# Patient Record
Sex: MALE | Race: WHITE | URBAN - METROPOLITAN AREA
[De-identification: names, ages, dates, MRNs, and addresses within clinical notes are randomized per-mention and may not be internally consistent; named-entity substitution may affect disease eponyms.]

---

## 2017-10-22 ENCOUNTER — EMERGENCY (EMERGENCY)
Facility: HOSPITAL | Age: 72
LOS: 1 days | Discharge: HOME | End: 2017-10-22
Attending: EMERGENCY MEDICINE | Admitting: EMERGENCY MEDICINE
Payer: MEDICARE

## 2017-10-22 VITALS
HEART RATE: 89 BPM | WEIGHT: 169.98 LBS | TEMPERATURE: 98 F | DIASTOLIC BLOOD PRESSURE: 89 MMHG | SYSTOLIC BLOOD PRESSURE: 156 MMHG | OXYGEN SATURATION: 100 % | RESPIRATION RATE: 16 BRPM

## 2017-10-22 VITALS
OXYGEN SATURATION: 98 % | RESPIRATION RATE: 16 BRPM | DIASTOLIC BLOOD PRESSURE: 95 MMHG | HEART RATE: 88 BPM | SYSTOLIC BLOOD PRESSURE: 145 MMHG | TEMPERATURE: 98 F

## 2017-10-22 PROCEDURE — 74020: CPT | Mod: 26

## 2017-10-22 PROCEDURE — 99283 EMERGENCY DEPT VISIT LOW MDM: CPT | Mod: GC

## 2017-10-22 PROCEDURE — 74020: CPT

## 2017-10-22 PROCEDURE — 99283 EMERGENCY DEPT VISIT LOW MDM: CPT | Mod: 25

## 2017-10-22 RX ORDER — MULTIVIT WITH MIN/MFOLATE/K2 340-15/3 G
180 POWDER (GRAM) ORAL ONCE
Qty: 0 | Refills: 0 | Status: COMPLETED | OUTPATIENT
Start: 2017-10-22 | End: 2017-10-22

## 2017-10-22 RX ADMIN — Medication 1 ENEMA: at 08:05

## 2017-10-22 NOTE — ED ADULT NURSE NOTE - CHPI ED SYMPTOMS NEG
no dysuria/no burning urination/no diarrhea/no chills/no vomiting/no nausea/no blood in stool/no abdominal distension/no fever/no hematuria

## 2017-10-22 NOTE — ED PROVIDER NOTE - PROGRESS NOTE DETAILS
Discussed with patient the findings of his xray (heavy stool burden) and next step of fleet enema.  Moved patient from hallway bed to room for procedure.  Patient became anxious and upset at the delay to treatment and notified Dr. Bassem Jj that he was upset.  Within 5 minutes of making comments to Dr. Jj patient was seen by Dr. Rodriguez in room for enema administration.  Patient calmed down. Discussed with patient the findings of his xray (heavy stool burden) and next step of fleet enema.  Moved patient from hallway bed to room for procedure.  Patient became anxious and upset at the delay to treatment and notified team that he was upset.  Within 5 minutes of making comments to team, patient was seen by Dr. Rodriguez in room for enema administration.  Patient calmed down.

## 2017-10-22 NOTE — ED PROVIDER NOTE - OBJECTIVE STATEMENT
72M with no past medical history presents with 2d h/o constipation.  States he is normally regular on a daily basis.  Has had no changes to his diet and takes fiber regularly.  Denies abdominal pain, n/v, fever, chills, dysuria, hematuria, melena, hematochezia, chest pain, shortness of breath, rectal pain.

## 2017-10-22 NOTE — ED PROVIDER NOTE - ATTENDING CONTRIBUTION TO CARE
Attending MD Erickson:  I personally have seen and examined this patient.  Resident note reviewed and agree on plan of care and except where noted.  See MDM for details.

## 2017-10-22 NOTE — ED ADULT NURSE REASSESSMENT NOTE - NS ED NURSE REASSESS COMMENT FT1
Pt refusing treatment observed speaking with MD Rodriguez. Pt reporting upset " with my treatment here" " No one seen me or came to speak to me" patient reassured that   he was seen by Rn and MD ( resident) and Xray was ordered. Refusing Enema reports "I don't want this kind of Nurse to give me the enema" "I prefer one of the female nurses" MD Rodriguez at bedside.

## 2017-10-22 NOTE — ED PROVIDER NOTE - MEDICAL DECISION MAKING DETAILS
Dre ETIENNE: 73 y/o male without PMH here with constipation. Patient reports a few weeks of straining to go to the bathroom and 3 days of constipation. Patient reports some stressors at home. Denies fever, abd pain, abd distention, melena, BRBPR, fever, N/V or hx of abd surgeries or weight loss. Exam shows a man in NAD. Abd soft and nontender and nondistended. No CVA tenderness. Rectal exam with nonbleeding and nonthrombosed hemorrhoid at the 6 O'clock position. No rectal impaction noted. Patient appears well overall and will get an enema and to be reassessed.

## 2017-10-22 NOTE — ED ADULT NURSE NOTE - OBJECTIVE STATEMENT
71 yo m arrived ambulatory from triage. Reports abd pain and constipation. No pertinent PMH.  last BM 2 days ago. No changes to diet. MD at bedside. Denies NVD. No fever or chills.

## 2017-10-22 NOTE — ED ADULT NURSE REASSESSMENT NOTE - NS ED NURSE REASSESS COMMENT FT1
pt received in [facility] after speaking with ANM pt with rectal exam done by Dr Rodriguez with small hemmorhoid on exam no impaction pt agreed to Fleet which was given as ordered pt had some movement after procedure pt verbalized feeling very stressed due to issue with bank and identity theft states bowel issues started at that time pt has no primary care md pt encouraged to find someone to talk with about stressful issues and follow up with a md for GI evaluation pt agreed that was a good idea pt pending a discharge from the ER

## 2021-02-08 ENCOUNTER — INPATIENT (INPATIENT)
Facility: HOSPITAL | Age: 76
LOS: 10 days | Discharge: NURSING, REHAB OR LONG-TERM CARE | DRG: 471 | End: 2021-02-19
Attending: NEUROLOGICAL SURGERY | Admitting: NEUROLOGICAL SURGERY
Payer: MEDICARE

## 2021-02-08 VITALS
TEMPERATURE: 97 F | SYSTOLIC BLOOD PRESSURE: 150 MMHG | HEART RATE: 91 BPM | DIASTOLIC BLOOD PRESSURE: 90 MMHG | HEIGHT: 73 IN | WEIGHT: 139.99 LBS | OXYGEN SATURATION: 97 % | RESPIRATION RATE: 18 BRPM

## 2021-02-08 LAB
ALBUMIN SERPL ELPH-MCNC: 4.5 G/DL — SIGNIFICANT CHANGE UP (ref 3.3–5)
ALP SERPL-CCNC: 133 U/L — HIGH (ref 40–120)
ALT FLD-CCNC: 12 U/L — SIGNIFICANT CHANGE UP (ref 10–45)
ANION GAP SERPL CALC-SCNC: 20 MMOL/L — HIGH (ref 5–17)
APTT BLD: 29.2 SEC — SIGNIFICANT CHANGE UP (ref 27.5–35.5)
AST SERPL-CCNC: 26 U/L — SIGNIFICANT CHANGE UP (ref 10–40)
BASOPHILS # BLD AUTO: 0.08 K/UL — SIGNIFICANT CHANGE UP (ref 0–0.2)
BASOPHILS NFR BLD AUTO: 0.9 % — SIGNIFICANT CHANGE UP (ref 0–2)
BILIRUB SERPL-MCNC: 1.3 MG/DL — HIGH (ref 0.2–1.2)
BLD GP AB SCN SERPL QL: NEGATIVE — SIGNIFICANT CHANGE UP
BUN SERPL-MCNC: 34 MG/DL — HIGH (ref 7–23)
CALCIUM SERPL-MCNC: 9.7 MG/DL — SIGNIFICANT CHANGE UP (ref 8.4–10.5)
CHLORIDE SERPL-SCNC: 107 MMOL/L — SIGNIFICANT CHANGE UP (ref 96–108)
CK SERPL-CCNC: 204 U/L — HIGH (ref 30–200)
CO2 SERPL-SCNC: 21 MMOL/L — LOW (ref 22–31)
CREAT SERPL-MCNC: 1.42 MG/DL — HIGH (ref 0.5–1.3)
EOSINOPHIL # BLD AUTO: 0.05 K/UL — SIGNIFICANT CHANGE UP (ref 0–0.5)
EOSINOPHIL NFR BLD AUTO: 0.6 % — SIGNIFICANT CHANGE UP (ref 0–6)
GLUCOSE SERPL-MCNC: 83 MG/DL — SIGNIFICANT CHANGE UP (ref 70–99)
HCT VFR BLD CALC: 40.4 % — SIGNIFICANT CHANGE UP (ref 39–50)
HGB BLD-MCNC: 12.7 G/DL — LOW (ref 13–17)
IMM GRANULOCYTES NFR BLD AUTO: 0.3 % — SIGNIFICANT CHANGE UP (ref 0–1.5)
INR BLD: 1.04 RATIO — SIGNIFICANT CHANGE UP (ref 0.88–1.16)
LYMPHOCYTES # BLD AUTO: 0.95 K/UL — LOW (ref 1–3.3)
LYMPHOCYTES # BLD AUTO: 10.8 % — LOW (ref 13–44)
MCHC RBC-ENTMCNC: 29.3 PG — SIGNIFICANT CHANGE UP (ref 27–34)
MCHC RBC-ENTMCNC: 31.4 GM/DL — LOW (ref 32–36)
MCV RBC AUTO: 93.3 FL — SIGNIFICANT CHANGE UP (ref 80–100)
MONOCYTES # BLD AUTO: 0.62 K/UL — SIGNIFICANT CHANGE UP (ref 0–0.9)
MONOCYTES NFR BLD AUTO: 7 % — SIGNIFICANT CHANGE UP (ref 2–14)
NEUTROPHILS # BLD AUTO: 7.1 K/UL — SIGNIFICANT CHANGE UP (ref 1.8–7.4)
NEUTROPHILS NFR BLD AUTO: 80.4 % — HIGH (ref 43–77)
NRBC # BLD: 0 /100 WBCS — SIGNIFICANT CHANGE UP (ref 0–0)
PLATELET # BLD AUTO: 308 K/UL — SIGNIFICANT CHANGE UP (ref 150–400)
POTASSIUM SERPL-MCNC: 4.2 MMOL/L — SIGNIFICANT CHANGE UP (ref 3.5–5.3)
POTASSIUM SERPL-SCNC: 4.2 MMOL/L — SIGNIFICANT CHANGE UP (ref 3.5–5.3)
PROT SERPL-MCNC: 8 G/DL — SIGNIFICANT CHANGE UP (ref 6–8.3)
PROTHROM AB SERPL-ACNC: 12.4 SEC — SIGNIFICANT CHANGE UP (ref 10.6–13.6)
RBC # BLD: 4.33 M/UL — SIGNIFICANT CHANGE UP (ref 4.2–5.8)
RBC # FLD: 13.1 % — SIGNIFICANT CHANGE UP (ref 10.3–14.5)
RH IG SCN BLD-IMP: POSITIVE — SIGNIFICANT CHANGE UP
RH IG SCN BLD-IMP: POSITIVE — SIGNIFICANT CHANGE UP
SODIUM SERPL-SCNC: 148 MMOL/L — HIGH (ref 135–145)
WBC # BLD: 8.83 K/UL — SIGNIFICANT CHANGE UP (ref 3.8–10.5)
WBC # FLD AUTO: 8.83 K/UL — SIGNIFICANT CHANGE UP (ref 3.8–10.5)

## 2021-02-08 PROCEDURE — 73502 X-RAY EXAM HIP UNI 2-3 VIEWS: CPT | Mod: 26,LT

## 2021-02-08 PROCEDURE — 74177 CT ABD & PELVIS W/CONTRAST: CPT | Mod: 26,MA

## 2021-02-08 PROCEDURE — 72125 CT NECK SPINE W/O DYE: CPT | Mod: 26,MH

## 2021-02-08 PROCEDURE — 70498 CT ANGIOGRAPHY NECK: CPT | Mod: 26,MA

## 2021-02-08 PROCEDURE — 99291 CRITICAL CARE FIRST HOUR: CPT | Mod: GC

## 2021-02-08 PROCEDURE — 72128 CT CHEST SPINE W/O DYE: CPT | Mod: 26,MA

## 2021-02-08 PROCEDURE — 72131 CT LUMBAR SPINE W/O DYE: CPT | Mod: 26,MA

## 2021-02-08 PROCEDURE — 73552 X-RAY EXAM OF FEMUR 2/>: CPT | Mod: 26,LT

## 2021-02-08 PROCEDURE — 71260 CT THORAX DX C+: CPT | Mod: 26,MA

## 2021-02-08 PROCEDURE — 71045 X-RAY EXAM CHEST 1 VIEW: CPT | Mod: 26

## 2021-02-08 RX ORDER — MORPHINE SULFATE 50 MG/1
4 CAPSULE, EXTENDED RELEASE ORAL ONCE
Refills: 0 | Status: DISCONTINUED | OUTPATIENT
Start: 2021-02-08 | End: 2021-02-08

## 2021-02-08 RX ORDER — TETANUS TOXOID, REDUCED DIPHTHERIA TOXOID AND ACELLULAR PERTUSSIS VACCINE, ADSORBED 5; 2.5; 8; 8; 2.5 [IU]/.5ML; [IU]/.5ML; UG/.5ML; UG/.5ML; UG/.5ML
0.5 SUSPENSION INTRAMUSCULAR ONCE
Refills: 0 | Status: COMPLETED | OUTPATIENT
Start: 2021-02-08 | End: 2021-02-08

## 2021-02-08 RX ORDER — SODIUM CHLORIDE 9 MG/ML
1000 INJECTION INTRAMUSCULAR; INTRAVENOUS; SUBCUTANEOUS ONCE
Refills: 0 | Status: COMPLETED | OUTPATIENT
Start: 2021-02-08 | End: 2021-02-08

## 2021-02-08 RX ORDER — IBUPROFEN 200 MG
600 TABLET ORAL ONCE
Refills: 0 | Status: COMPLETED | OUTPATIENT
Start: 2021-02-08 | End: 2021-02-08

## 2021-02-08 RX ORDER — ACETAMINOPHEN 500 MG
650 TABLET ORAL ONCE
Refills: 0 | Status: COMPLETED | OUTPATIENT
Start: 2021-02-08 | End: 2021-02-08

## 2021-02-08 RX ADMIN — Medication 650 MILLIGRAM(S): at 18:54

## 2021-02-08 RX ADMIN — Medication 600 MILLIGRAM(S): at 18:54

## 2021-02-08 RX ADMIN — MORPHINE SULFATE 4 MILLIGRAM(S): 50 CAPSULE, EXTENDED RELEASE ORAL at 21:16

## 2021-02-08 RX ADMIN — SODIUM CHLORIDE 1000 MILLILITER(S): 9 INJECTION INTRAMUSCULAR; INTRAVENOUS; SUBCUTANEOUS at 18:54

## 2021-02-08 RX ADMIN — TETANUS TOXOID, REDUCED DIPHTHERIA TOXOID AND ACELLULAR PERTUSSIS VACCINE, ADSORBED 0.5 MILLILITER(S): 5; 2.5; 8; 8; 2.5 SUSPENSION INTRAMUSCULAR at 18:55

## 2021-02-08 RX ADMIN — Medication 1 MILLIGRAM(S): at 23:00

## 2021-02-08 NOTE — ED PROVIDER NOTE - PROGRESS NOTE DETAILS
Nadeem Jones D.O., PGY2 (Resident)  Patient signed out to me. No current neuro deficits. Neurosurg c/s. CTA neck ordered for BCVI. Trauma scan Nadeem Jones D.O., PGY2 (Resident)  Spoke to neurosurg. Would like MR whole spine, noncontrast to eval compression. MRI and neurorads called to protocol. Patient informed. Sara Saldana MD pt refusing MRI, spoke w/ patient regarding risk benefits of MRI, he was informed that he could be paralyzed, pt refusing dutton catheter, he states that wants to take care of one issue at a time. He reports that the MRI is something he is interested in but he wants his brother and him to be counselled on surgery together. I informed him that the neurosurgeon will discuss sx w/ him once they are able to surgically plan the operation if needed as pt is currently in the diagnostic stage of his workup Sara Saldana MD - l acetabulum and l pubic rami fx orthopedic consult Sara Saldana MD pt states that he doesn't want to go to ct scan "I already had this test" I informed him that we would be doing a different CT scan, he states that he wants an MRI instead. He is agreement w/ CT, was taken to the scanner and held in c spine precautions, pt continues to move his head, he is repeatedly informed to stop moving his head. orthopedic consult, will be down to evaluate, report likely nonoperative management Nadeem Jones D.O., PGY2 (Resident)  Med c/s placed per neurosurg recs for distended bladder. Patient still w/o urge to urinate, nontender abdomen. Neurosurgery at this time defers admission form dens fracture due to concern the service will not be equipped to manage urinary retention. Request admission to medicine. Pt declines dutton catheter. DIOGENES.

## 2021-02-08 NOTE — ED PROVIDER NOTE - CLINICAL SUMMARY MEDICAL DECISION MAKING FREE TEXT BOX
76 yo M with no pmh presents with mechanical fall 4 days ago complaining of neck, left chest wall tenderness and left thigh pain. VS WNL. Dried blood on his scalp. Midline C1-2 TTP, left sided lateral femur TTP, limited left hip flexion. WIll evaluate for intracranial hemorrhage, femur / hip fractures. + pain control, basic labs and likely as pt is currently not ambulatory.

## 2021-02-08 NOTE — ED PROVIDER NOTE - NS ED ROS FT
GENERAL: No fever or chills  EYES: no change in vision  HEENT: no trouble swallowing or speaking  CARDIAC: no chest pain or palpiations   PULMONARY: no cough or SOB  GI: no abdominal pain, vomiting, diarrhea, or constipation   : No changes in urination  SKIN: no rashes  NEURO: no headache, numbness, or weakness.  MSK: see hpi

## 2021-02-08 NOTE — ED ADULT NURSE REASSESSMENT NOTE - NS ED NURSE REASSESS COMMENT FT1
Patient refusing Lorenzo multiple times, MD and RN explained to patient Lorenzo, patient verbalized understanding and that he does not want Lorenzo at this time until he states "how this will help my back". MD explained to patient again about importance of Lorenzo insertion for bladder patient still currently refusing. Patient accepted MRI transport, when back from MRI will reevaluate patient concerns about Lorenzo insertion.

## 2021-02-08 NOTE — ED ADULT NURSE NOTE - OBJECTIVE STATEMENT
74 y/o M pt, present to ED for fall, pt report fall down stair 4 days, tripped over shoes, hit head, neg LOC, c/o of neck, pain, headache, left rib and leg pain, on exam pt A&Ox3, breathing spontaneous, unlabored w/o distress on room air, breathing spontaneous, unlabored w/o distress on room air, c-collar in place, left leg pain, weakness, abrasion to left back of head, seen and eval by MD, heplock placed, labs drawn and sent, meds given and ordered

## 2021-02-08 NOTE — ED PROVIDER NOTE - ATTENDING CONTRIBUTION TO CARE
75 M w/ mechanical fall on Thursday, slipped down 2 steps hit the back of his head and has been having trouble ambulating due to L hip pain/femur 75 M w/ mechanical fall on Thursday, slipped down 2 steps hit the back of his head and has been having trouble ambulating due to L hip pain/femur. Pt states that he spoke w/ family members who told him to  not come to the hospital due to concerns of COVID. Pt has been home moving around but hasn't been eating nor drinking as much as he usually does, he has been crawling at times at  home, He states that he hasn't been able to walk straight due to severe pain in his neck and L hip.   Pt w/ posterior c1/c2 spinal tenderness. He has weakness in the L hip due to pain. he is covered in urine. abd soft, brusing on the L knee, has intact flexion and extension of bialteral arms, w/ intact ankel flexion and extension bilaterally, intact sensation in the arms/legs bilaterally, w/ 2+ radial and DP pulse. Will have labs, ct and reassessment   Given finding of dens fracture in the setting of pt falling down 2 steps will obtain broad ct imaging, decision to ac VS antiplatelet determined by the findings of disection on the ct 75 M w/ mechanical fall on Thursday, slipped down 2 steps hit the back of his head and has been having trouble ambulating due to L hip pain/femur. Pt states that he spoke w/ family members who told him to  not come to the hospital due to concerns of COVID. Pt has been home moving around but hasn't been eating nor drinking as much as he usually does, he has been crawling at times at  home, He states that he hasn't been able to walk straight due to severe pain in his neck and L hip. Pt reports he feels embarresed coming to the er as he is covered in Urine and hasn't been able to eat or clean himself or shower due to his pain. He states that "I wish I didn't listen to the people who told me not to come to the hospital" Pt reports that he lives at home, was brought in by medics.   Upon arrival to [facility] room, he smells of urine, I undressed the patient and there is urine on his shirt/sweater, underwear and sweat pants, he states he hasn't changed his clothing since thursday. Pt is aaox3 w/ odd affect he is wearing a cervical collar but continues to move his head despite being told repeatedly that we are evaluating him for a spinal injury. He states that he understands and he will try not to move his neck. He has c1/c2 spinal tenderness. He has weakness in the L hip due to pain. 4/5 in the LLE and 5/5 in RLE and 5/5 w/ bilateral UE, he is covered in urine. abd soft, bruising on the L knee, has intact flexion and extension of bialteral arms, w/ intact ankel flexion and extension bilaterally, intact sensation in the arms/legs bilaterally, w/ 2+ radial and DP pulse. Will have labs, ct and reassessment   Given finding of dens fracture in the setting of pt falling down 2 steps will obtain broad ct imaging, decision to ac VS antiplatelet determined by the findings of CTA evaluating for dissection.   Seen by nsg recommending MRI,   Lorenzo catheter recommended based on findigns   while in the department pt starts repeating himself and acting more confused unclear if patient has underlying dementia, will continue to monitor or if a medication side effect. pt continues to state repeatedly "No one gave me any thing for pain" s/p receiving tylenol and motrin. As a result, will dose again w/ medication, given morphine. Pt is continuing to refuse multiple testing and studies. I spent over 30 minutes explaining to him the importance of an MRI. Pt verbalized understanding and starts mentioning how he was on an elevator. despite not being in an elevator his entire er stay. Discussion w/ pts brother regarding spinal fracture. awaiting MRI results at time of sign out. 75 M w/ mechanical fall on Thursday, slipped down 2 steps hit the back of his head and has been having trouble ambulating due to L hip pain/femur. Pt states that he spoke w/ family members who told him to  not come to the hospital due to concerns of COVID. Pt has been home moving around but hasn't been eating nor drinking as much as he usually does, he has been crawling at times at  home, He states that he hasn't been able to walk straight due to severe pain in his neck and L hip. Pt reports he feels embarresed coming to the er as he is covered in Urine and hasn't been able to eat or clean himself or shower due to his pain. He states that "I wish I didn't listen to the people who told me not to come to the hospital" Pt reports that he lives at home, was brought in by medics.   Upon arrival to [facility] room, he smells of urine, there is urine on his shirt/sweater, underwear and sweat pants, he states he hasn't changed his clothing since Thursday. Pt is aaox3 w/ odd affect he is wearing a cervical collar but continues to move his head despite being told repeatedly that we are evaluating him for a spinal injury. He has c1/c2 spinal tenderness. He has weakness in the L hip due to pain in the pelvis, 4/5 in the LLE and 5/5 in RLE and 5/5 w/ bilateral UE. abd soft, no chest wall tenderness; bruising on the L knee, has intact flexion and extension of bialteral arms, w/ intact ankel flexion and extension bilaterally, intact sensation in the arms/legs bilaterally, w/ 2+ radial and DP pulse. Will have labs, ct and reassessment   Given finding of dens fracture in the setting of pt falling down 2 steps will obtain broad ct imaging, decision to ac VS antiplatelet determined by the findings of CTA evaluating for dissection.   Seen by nsg recommending MRI,   Lorenzo catheter recommended based on findigns   while in the department pt starts repeating himself and acting more confused unclear if patient has underlying dementia, will continue to monitor or if a medication side effect. pt continues to state repeatedly "No one gave me any thing for pain" s/p receiving tylenol and motrin. As a result, will dose again w/ medication, given morphine. Pt is continuing to refuse multiple testing and studies. I spent over 30 minutes explaining to him the importance of an MRI. Pt verbalized understanding and starts mentioning how he was on an elevator. despite not being in an elevator his entire er stay. Discussion w/ pts brother regarding spinal fracture. awaiting MRI results at time of sign out.

## 2021-02-08 NOTE — CONSULT NOTE ADULT - SUBJECTIVE AND OBJECTIVE BOX
p ([id number])     HPI:  74 yo M with no pmh presents with mechanical fall 4 days ago complaining of neck, left chest wall tenderness and left thigh pain. Reports slipping 2 steps while going downstairs 2 days ago, landed on his back and hit his head. Unsure of LOC. Not on blood thinners. Reports bleeding from the scalp now resolved. Reports nausea, but no vomiting or vision deficits. Has not been able to ambulate due to severe pain. Has not eaten in the last 3 days. Lives alone. Denies chest pain, sob, urinary symptoms, or fever.    Imaging:  Impacted angulated C2 fracture extending into the lateral masses involving the vascular foramen bilaterally. Risk of vertebral artery dissection.  Exam:  aaox3, UE 5/5 LLE 4/5, RLE 5/5, no hoffmans or clonus,   --Anticoagulation:    =====================  PAST MEDICAL HISTORY     PAST SURGICAL HISTORY         MEDICATIONS:  Antibiotics:    Neuro:    Other:      SOCIAL HISTORY:   Occupation:   Marital Status:     FAMILY HISTORY:      ROS: Negative except per HPI    LABS:  PT/INR - ( 08 Feb 2021 19:26 )   PT: 12.4 sec;   INR: 1.04 ratio         PTT - ( 08 Feb 2021 19:26 )  PTT:29.2 sec                        12.7   8.83  )-----------( 308      ( 08 Feb 2021 19:26 )             40.4     02-08    148<H>  |  107  |  34<H>  ----------------------------<  83  4.2   |  21<L>  |  1.42<H>    Ca    9.7      08 Feb 2021 19:26    TPro  8.0  /  Alb  4.5  /  TBili  1.3<H>  /  DBili  x   /  AST  26  /  ALT  12  /  AlkPhos  133<H>  02-08

## 2021-02-08 NOTE — CONSULT NOTE ADULT - ASSESSMENT
KORIN [place]  75M w/ pmh of constipation and recent weight loss pw mechanical fall 4 days ago found to have type 2C complicated dens fx. He tripped down a flight of stairs and doesn't remember the details of the fall, + head trauma, + LOC. Has been bed bound ever since due to severe neck pain, His legs have recently been giving out on him when he tries to walk.  Did not come in due to fear of covid. Not on AC/AP. No cancer hx. Currently has sig neck pain, no radiculopathy, no headache, no back pain, no numbness, no new trouble with bowel/ bladder.  Imaging: Impacted angulated C2 dens fracture extending into the lateral masses involving the vascular foramen bilaterally.  Exam: aaox3, UE 5/5 LLE 4/5, RLE 5/5, no hoffmans or clonus, no facial, no drift  - CTA head and neck for rule out vertebral artery dissection  - MRI C spine, MRI T/L cord compression  - C collar at all times   [place]  75M w/ pmh of constipation and recent weight loss pw mechanical fall 4 days ago found to have type 2C complicated dens fx. He tripped down a flight of stairs and doesn't remember the details of the fall, + head trauma, + LOC. Has been bed bound ever since due to severe neck pain, His legs have recently been giving out on him when he tries to walk.  Did not come in due to fear of covid. Not on AC/AP. No cancer hx. Currently has sig neck pain, no radiculopathy, no headache, no back pain, no numbness, no new trouble with bowel/ bladder.  Imaging: Impacted angulated C2 dens fracture extending into the lateral masses involving the vascular foramen bilaterally.  Exam: aaox3, UE 5/5 LLE 4/5, RLE 5/5, no hoffmans or clonus, no facial, no drift  - CTA head and neck for rule out vertebral artery dissection  - MRI C spine, MRI T/L cord compression  - bedrest  - C collar at all times

## 2021-02-08 NOTE — ED PROVIDER NOTE - OBJECTIVE STATEMENT
76 yo M with no pmh presents with mechanical fall 4 days ago complaining of neck, left chest wall tenderness and left thigh pain. Reports slipping 2 steps while going downstairs 2 days ago, landed on his back and hit his head. Unsure of LOC. Not on blood thinners. Reports bleeding from the scalp now resolved. Reports nausea, but no vomiting or vision deficits. Has not been able to ambulate due to severe pain. Has not eaten in the last 3 days. Lives alone. Denies chest pain, sob, urinary symptoms, or fever.

## 2021-02-08 NOTE — ED ADULT NURSE REASSESSMENT NOTE - NS ED NURSE REASSESS COMMENT FT1
Patient refusing MRI transport states he does not want to go until doctor speaks with patients brother, MD spoke w/ patients brother accepted all patient care for brother and patient still refusing MRi transport at this time will return to reevaluate.

## 2021-02-08 NOTE — ED PROVIDER NOTE - PHYSICAL EXAMINATION
Gen: AAOx3, non-toxic  Head: occipital left sided dried blood, no palpable fractures.   HEENT: Pt in  a C-collar, C1-2 midline and left sided paraspinal TTP. EOMI, PERRL,  oral mucosa moist, normal conjunctiva  Lung: CTAB, no respiratory distress, no wheezes/rhonchi/rales B/L, speaking in full sentences  CV: RRR, no murmurs, rubs or gallops  Abd: soft, NTND, no guarding, no CVA tenderness  MSK: no visible deformities  Neuro: No focal sensory or motor deficits, normal finger to nose.  unable to asses ambulation.   Skin: Warm, well perfused, no rash  Psych: normal affect.

## 2021-02-08 NOTE — ED PROVIDER NOTE - CARE PLAN
Principal Discharge DX:	Closed odontoid fracture, initial encounter  Secondary Diagnosis:	Hydronephrosis  Secondary Diagnosis:	ALEKSANDR (acute kidney injury)

## 2021-02-09 DIAGNOSIS — S12.100A UNSPECIFIED DISPLACED FRACTURE OF SECOND CERVICAL VERTEBRA, INITIAL ENCOUNTER FOR CLOSED FRACTURE: ICD-10-CM

## 2021-02-09 LAB
ANION GAP SERPL CALC-SCNC: 11 MMOL/L — SIGNIFICANT CHANGE UP (ref 5–17)
ANION GAP SERPL CALC-SCNC: 16 MMOL/L — SIGNIFICANT CHANGE UP (ref 5–17)
APPEARANCE UR: CLEAR — SIGNIFICANT CHANGE UP
BACTERIA # UR AUTO: NEGATIVE — SIGNIFICANT CHANGE UP
BILIRUB UR-MCNC: NEGATIVE — SIGNIFICANT CHANGE UP
BUN SERPL-MCNC: 31 MG/DL — HIGH (ref 7–23)
BUN SERPL-MCNC: 34 MG/DL — HIGH (ref 7–23)
CALCIUM SERPL-MCNC: 9 MG/DL — SIGNIFICANT CHANGE UP (ref 8.4–10.5)
CALCIUM SERPL-MCNC: 9.3 MG/DL — SIGNIFICANT CHANGE UP (ref 8.4–10.5)
CHLORIDE SERPL-SCNC: 106 MMOL/L — SIGNIFICANT CHANGE UP (ref 96–108)
CHLORIDE SERPL-SCNC: 108 MMOL/L — SIGNIFICANT CHANGE UP (ref 96–108)
CO2 SERPL-SCNC: 21 MMOL/L — LOW (ref 22–31)
CO2 SERPL-SCNC: 25 MMOL/L — SIGNIFICANT CHANGE UP (ref 22–31)
COLOR SPEC: YELLOW — SIGNIFICANT CHANGE UP
CREAT SERPL-MCNC: 1.44 MG/DL — HIGH (ref 0.5–1.3)
CREAT SERPL-MCNC: 1.55 MG/DL — HIGH (ref 0.5–1.3)
DIFF PNL FLD: ABNORMAL
EPI CELLS # UR: 1 /HPF — SIGNIFICANT CHANGE UP
GLUCOSE SERPL-MCNC: 108 MG/DL — HIGH (ref 70–99)
GLUCOSE SERPL-MCNC: 80 MG/DL — SIGNIFICANT CHANGE UP (ref 70–99)
GLUCOSE UR QL: NEGATIVE — SIGNIFICANT CHANGE UP
HYALINE CASTS # UR AUTO: 3 /LPF — HIGH (ref 0–2)
KETONES UR-MCNC: ABNORMAL
LEUKOCYTE ESTERASE UR-ACNC: NEGATIVE — SIGNIFICANT CHANGE UP
MAGNESIUM SERPL-MCNC: 2.5 MG/DL — SIGNIFICANT CHANGE UP (ref 1.6–2.6)
MAGNESIUM SERPL-MCNC: 2.6 MG/DL — SIGNIFICANT CHANGE UP (ref 1.6–2.6)
NITRITE UR-MCNC: NEGATIVE — SIGNIFICANT CHANGE UP
PH UR: 6 — SIGNIFICANT CHANGE UP (ref 5–8)
PHOSPHATE SERPL-MCNC: 1.7 MG/DL — LOW (ref 2.5–4.5)
POTASSIUM SERPL-MCNC: 4.3 MMOL/L — SIGNIFICANT CHANGE UP (ref 3.5–5.3)
POTASSIUM SERPL-MCNC: 4.3 MMOL/L — SIGNIFICANT CHANGE UP (ref 3.5–5.3)
POTASSIUM SERPL-SCNC: 4.3 MMOL/L — SIGNIFICANT CHANGE UP (ref 3.5–5.3)
POTASSIUM SERPL-SCNC: 4.3 MMOL/L — SIGNIFICANT CHANGE UP (ref 3.5–5.3)
PROT UR-MCNC: ABNORMAL
RBC CASTS # UR COMP ASSIST: 19 /HPF — HIGH (ref 0–4)
SARS-COV-2 RNA SPEC QL NAA+PROBE: SIGNIFICANT CHANGE UP
SODIUM SERPL-SCNC: 143 MMOL/L — SIGNIFICANT CHANGE UP (ref 135–145)
SODIUM SERPL-SCNC: 144 MMOL/L — SIGNIFICANT CHANGE UP (ref 135–145)
SP GR SPEC: 1.02 — SIGNIFICANT CHANGE UP (ref 1.01–1.02)
UROBILINOGEN FLD QL: NEGATIVE — SIGNIFICANT CHANGE UP
WBC UR QL: 1 /HPF — SIGNIFICANT CHANGE UP (ref 0–5)

## 2021-02-09 PROCEDURE — 99223 1ST HOSP IP/OBS HIGH 75: CPT | Mod: AI

## 2021-02-09 PROCEDURE — 72146 MRI CHEST SPINE W/O DYE: CPT | Mod: 26,MA

## 2021-02-09 PROCEDURE — 72190 X-RAY EXAM OF PELVIS: CPT | Mod: 26

## 2021-02-09 PROCEDURE — 72148 MRI LUMBAR SPINE W/O DYE: CPT | Mod: 26,MA

## 2021-02-09 PROCEDURE — 99222 1ST HOSP IP/OBS MODERATE 55: CPT

## 2021-02-09 PROCEDURE — 99233 SBSQ HOSP IP/OBS HIGH 50: CPT | Mod: 57

## 2021-02-09 PROCEDURE — 72141 MRI NECK SPINE W/O DYE: CPT | Mod: 26,MA

## 2021-02-09 RX ORDER — SODIUM CHLORIDE 9 MG/ML
1000 INJECTION, SOLUTION INTRAVENOUS
Refills: 0 | Status: DISCONTINUED | OUTPATIENT
Start: 2021-02-09 | End: 2021-02-09

## 2021-02-09 RX ORDER — HYDROMORPHONE HYDROCHLORIDE 2 MG/ML
2 INJECTION INTRAMUSCULAR; INTRAVENOUS; SUBCUTANEOUS EVERY 6 HOURS
Refills: 0 | Status: DISCONTINUED | OUTPATIENT
Start: 2021-02-09 | End: 2021-02-15

## 2021-02-09 RX ORDER — ONDANSETRON 8 MG/1
4 TABLET, FILM COATED ORAL EVERY 6 HOURS
Refills: 0 | Status: DISCONTINUED | OUTPATIENT
Start: 2021-02-09 | End: 2021-02-15

## 2021-02-09 RX ORDER — HEPARIN SODIUM 5000 [USP'U]/ML
5000 INJECTION INTRAVENOUS; SUBCUTANEOUS EVERY 12 HOURS
Refills: 0 | Status: DISCONTINUED | OUTPATIENT
Start: 2021-02-09 | End: 2021-02-10

## 2021-02-09 RX ORDER — SODIUM CHLORIDE 9 MG/ML
1000 INJECTION INTRAMUSCULAR; INTRAVENOUS; SUBCUTANEOUS
Refills: 0 | Status: DISCONTINUED | OUTPATIENT
Start: 2021-02-09 | End: 2021-02-09

## 2021-02-09 RX ORDER — ACETAMINOPHEN 500 MG
650 TABLET ORAL EVERY 6 HOURS
Refills: 0 | Status: DISCONTINUED | OUTPATIENT
Start: 2021-02-09 | End: 2021-02-15

## 2021-02-09 RX ORDER — TAMSULOSIN HYDROCHLORIDE 0.4 MG/1
0.4 CAPSULE ORAL DAILY
Refills: 0 | Status: DISCONTINUED | OUTPATIENT
Start: 2021-02-09 | End: 2021-02-15

## 2021-02-09 RX ORDER — SODIUM,POTASSIUM PHOSPHATES 278-250MG
1 POWDER IN PACKET (EA) ORAL ONCE
Refills: 0 | Status: DISCONTINUED | OUTPATIENT
Start: 2021-02-09 | End: 2021-02-10

## 2021-02-09 RX ORDER — SODIUM CHLORIDE 9 MG/ML
1000 INJECTION INTRAMUSCULAR; INTRAVENOUS; SUBCUTANEOUS
Refills: 0 | Status: DISCONTINUED | OUTPATIENT
Start: 2021-02-09 | End: 2021-02-11

## 2021-02-09 RX ORDER — SENNA PLUS 8.6 MG/1
2 TABLET ORAL AT BEDTIME
Refills: 0 | Status: DISCONTINUED | OUTPATIENT
Start: 2021-02-09 | End: 2021-02-15

## 2021-02-09 RX ADMIN — SODIUM CHLORIDE 100 MILLILITER(S): 9 INJECTION, SOLUTION INTRAVENOUS at 09:25

## 2021-02-09 RX ADMIN — HEPARIN SODIUM 5000 UNIT(S): 5000 INJECTION INTRAVENOUS; SUBCUTANEOUS at 22:13

## 2021-02-09 RX ADMIN — TAMSULOSIN HYDROCHLORIDE 0.4 MILLIGRAM(S): 0.4 CAPSULE ORAL at 11:34

## 2021-02-09 RX ADMIN — HYDROMORPHONE HYDROCHLORIDE 2 MILLIGRAM(S): 2 INJECTION INTRAMUSCULAR; INTRAVENOUS; SUBCUTANEOUS at 22:13

## 2021-02-09 RX ADMIN — Medication 650 MILLIGRAM(S): at 11:34

## 2021-02-09 RX ADMIN — SODIUM CHLORIDE 75 MILLILITER(S): 9 INJECTION INTRAMUSCULAR; INTRAVENOUS; SUBCUTANEOUS at 17:14

## 2021-02-09 RX ADMIN — SODIUM CHLORIDE 75 MILLILITER(S): 9 INJECTION INTRAMUSCULAR; INTRAVENOUS; SUBCUTANEOUS at 22:13

## 2021-02-09 NOTE — H&P ADULT - ASSESSMENT
[place]  75M w/ pmh of constipation and recent weight loss pw mechanical fall 4 days ago found to have type 2C complicated dens fx. He tripped down a flight of stairs and doesn't remember the details of the fall, + head trauma, + LOC. Has been bed bound ever since due to severe neck pain, His legs have recently been giving out on him when he tries to walk.  Did not come in due to fear of covid. Not on AC/AP. No cancer hx. Currently has sig neck pain, no radiculopathy, no headache, no back pain, no numbness, no new trouble with bowel/ bladder.  Imaging: Impacted angulated C2 dens fracture extending into the lateral masses involving the vascular foramen bilaterally.  Exam: aaox3, UE 5/5 LLE 4/5, RLE 5/5, no hoffmans or clonus, no facial, no drift  - CTA head and neck for rule out vertebral artery dissection  - MRI C spine, MRI T/L cord compression  - bedrest  - C collar at all times

## 2021-02-09 NOTE — PROGRESS NOTE ADULT - SUBJECTIVE AND OBJECTIVE BOX
SUBJECTIVE:   Patient was seen and evaluated at bedside. Patient is resting in bed and is in no new acute distress.   OVERNIGHT EVENTS:   none   Vital Signs Last 24 Hrs  T(C): 36.3 (09 Feb 2021 06:36), Max: 37 (08 Feb 2021 18:50)  T(F): 97.4 (09 Feb 2021 06:36), Max: 98.6 (08 Feb 2021 18:50)  HR: 75 (09 Feb 2021 06:36) (69 - 91)  BP: 156/83 (09 Feb 2021 06:36) (133/82 - 159/86)  BP(mean): --  RR: 18 (09 Feb 2021 06:36) (16 - 18)  SpO2: 100% (09 Feb 2021 06:36) (97% - 100%)    PHYSICAL EXAM:    General: No Acute Distress     Neurological: Awake, alert oriented to person, place and time, Following Commands, PERRL, EOMI, Face Symmetrical, Speech Fluent, Moving all extremities, Muscle Strength -uppers full strength and lowers lle df/pf 5/5, hf 2/5 pain limited kf ke 3/5 pain limited and rle wnl , sensation wnl   Pulmonary: Clear to Auscultation, No Rales, No Rhonchi, No Wheezes     Cardiovascular: S1, S2, Regular Rate and Rhythm     Gastrointestinal: Soft, Nontender, Nondistended     Incision:   none   LABS:                        12.7   8.83  )-----------( 308      ( 08 Feb 2021 19:26 )             40.4    02-09    143  |  106  |  34<H>  ----------------------------<  80  4.3   |  21<L>  |  1.55<H>    Ca    9.0      09 Feb 2021 05:37  Mg     2.6     02-09    TPro  8.0  /  Alb  4.5  /  TBili  1.3<H>  /  DBili  x   /  AST  26  /  ALT  12  /  AlkPhos  133<H>  02-08  PT/INR - ( 08 Feb 2021 19:26 )   PT: 12.4 sec;   INR: 1.04 ratio         PTT - ( 08 Feb 2021 19:26 )  PTT:29.2 sec      DRAINS:     MEDICATIONS:  Antibiotics:    Neuro:  acetaminophen   Tablet .. 650 milliGRAM(s) Oral every 6 hours PRN Temp greater or equal to 38C (100.4F), Mild Pain (1 - 3)  ondansetron Injectable 4 milliGRAM(s) IV Push every 6 hours PRN Nausea and/or Vomiting    Cardiac:    Pulm:    GI/:  senna 2 Tablet(s) Oral at bedtime PRN Constipation    Other:   sodium chloride 0.9%. 1000 milliLiter(s) IV Continuous <Continuous>    DIET: [] Regular [] CCD [] Renal [] Puree [] Dysphagia [] Tube Feeds:   regular   IMAGING:   mr spine and pelvis 2/9     INTERPRETATION:  CLINICAL INDICATION: Post fall, dens and fracture    Magnetic resonance imaging of the cervical, thoracic, and lumbosacral spine was carried out with sagittal surface coil imaging from C1 to coccyx using T1 and fast spin echo T2 weighted images with and without fat saturation technique with axial T1 and fast spin echo T2 weighted imaging on a 1.5 Sarah magnet.    Comparison is made with prior cervical spine CT from earlier today.    There is an angulated impacted acute type II-III fracture of the dens extending into the body of C2 withoutdisplacement at the fracture fragments. There is no bony retropulsion or canal compression. There is no hemorrhage. Minimal prevertebral soft tissue swelling is noted.    The cervical vertebrae are otherwise normal in height and signal characteristics. Small disc osteophyte complexes are present at C4-5 C5-6 and C6-7 causing mild spinal stenosis without cord compression.    Degenerative vertebral joint and facet hypertrophy is identified.    Evaluation of the thoracic spine the thoracic vertebralbodies are normal in height and signal characteristics. There is a moderate levoscoliosis. The thoracic cord is normal in size, contour, and signal characteristics. The conus terminates normally at the L1-2 level.        The lumbar vertebral bodies are normal in height and signal intensity. There is no fracture or dislocation. There is no cauda equina compression.    Incidental note is made of a markedly enlarged bladder and bilateral hydronephrosis as well as left-sided hydroureteronephrosis.    IMPRESSION: Acute impacted type II-III dens fracture without the placement of fracture fragments. No canal compression or intraspinal hemorrhage. No cord compression. Unremarkable thoracic and lumbar spine.    Incidental enlarged bladder, right-sidedhydronephrosis and left-sided hydroureteronephrosis.    ct spine and pelvis   IMPRESSION:    1. No evidence of solid organ injury.    2. Acute mildly displaced fracture of the left inferior pubic ramus. Acute minimally displaced fractures of the left superior pubic ramus and left acetabulum.    3. Marked bladder distention, possibly related to an enlarged prostate. Correlation for urinary retention is recommended. Correlation with PSA is also recommended for the prostate.    4. Severe left hydroureteronephrosis, likely chronic given associated left renal cortical thinning. Follow-up CT urogram can be considered for further evaluation.    Preliminary findings were discussed with Dr. LLOYD  2/8/2021 9:21 PM by Dr. Lovell with read back confirmation. Amended findings were  discussed with Dr. Saldana by Dr. Luo at 10:39 PM on 2/8/2021.

## 2021-02-09 NOTE — CONSULT NOTE ADULT - SUBJECTIVE AND OBJECTIVE BOX
JAMAICA LANGLEY  75y  Male    Patient is a 75y old  Male who presents with a chief complaint of fall     HPI: 75 M with no PMH presents s/p mechanical fall on Thursday when he slipped down stairs, landed on his back. He hit his head and had some bleeding however no loss of consciousness. Since then he has had difficulty ambulating. He has been having pain in the neck, chest wall, and L hip. Patient is not on any blood thinners. No fevers, chills, chest pain, shortness of breath. No n/v/diarrhea.     REVIEW OF SYSTEMS:    CONSTITUTIONAL: + generalized weakness, no fevers or chills  HEAD: no pain  EYES/ENT: No visual changes;  No vertigo or throat pain   NECK: neck pain  RESPIRATORY: No cough, wheezing, hemoptysis; No shortness of breath  CARDIOVASCULAR: No chest pain or palpitations  GASTROINTESTINAL: No abdominal pain; nausea, vomiting;  diarrhea or constipation. No hemetemesis, melena or hematochezia.  GENITOURINARY: No dysuria or hematuria, + increased urinary frequency  NEUROLOGICAL: lower extremity weakness, no numbness   SKIN: No itching, burning, rashes, or lesions   All other review of systems is negative unless indicated above.      T(C): 36.5 (02-09-21 @ 01:25), Max: 37 (02-08-21 @ 18:50)  HR: 80 (02-09-21 @ 01:25) (80 - 91)  BP: 133/82 (02-09-21 @ 01:25) (133/82 - 159/86)  RR: 16 (02-09-21 @ 01:25) (16 - 18)  SpO2: 98% (02-09-21 @ 01:25) (97% - 100%)  Wt(kg): --Vital Signs Last 24 Hrs  T(C): 36.5 (09 Feb 2021 01:25), Max: 37 (08 Feb 2021 18:50)  T(F): 97.7 (09 Feb 2021 01:25), Max: 98.6 (08 Feb 2021 18:50)  HR: 80 (09 Feb 2021 01:25) (80 - 91)  BP: 133/82 (09 Feb 2021 01:25) (133/82 - 159/86)  BP(mean): --  RR: 16 (09 Feb 2021 01:25) (16 - 18)  SpO2: 98% (09 Feb 2021 01:25) (97% - 100%)    PHYSICAL EXAM:  GENERAL: NAD with C-collar on, cachectic elderly male  HEAD:  Atraumatic, Normocephalic  EYES: conjunctiva and sclera clear  ENMT: Dry MM  NECK: C-collar   NERVOUS SYSTEM: AOX3, 4/5 strength in RLE, 3/5 in LLE, 5/5 in b/l UE,   CHEST/LUNG: Clear to percussion bilaterally; No rales, rhonchi, wheezing, or rubs  HEART: Regular rate and rhythm; No murmurs, rubs, or gallops  ABDOMEN: Soft, Nontender, distended bladder  EXTREMITIES:  2+ Peripheral Pulses, No clubbing, cyanosis, or edema  SKIN: No rashes or lesions    Consultant(s) Notes Reviewed:  [x ] YES  [ ] NO  Care Discussed with Consultants/Other Providers [ x] YES  [ ] NO    LABS:  CBC   02-08-21 @ 19:26  Hematcorit 40.4  Hemoglobin 12.7  Mean Cell Hemoglobin 29.3  Platelet Count-Automated 308  RBC Count 4.33  Red Cell Distrib Width 13.1  Wbc Count 8.83      BMP  02-08-21 @ 19:26  Anion Gap. Serum 20  Blood Urea Nitrogen,Serm 34  Calcium, Total Serum 9.7  Carbon Dioxide, Serum 21  Chloride, Serum 107  Creatinine, Serum 1.42  eGFR in  56  eGFR in Non Afican American 49  Gloucose, serum 83  Potassium, Serum 4.2  Sodium, Serum 148    CMP  02-08-21 @ 19:26  Jenelle Aminotransferase(ALT/SGPT)12  Albumin, Serum 4.5  Alkaline Phosphatase, Serum 133  Anion Gap, Serum 20  Aspartate Aminotransferase (AST/SGOT)26  Bilirubin Total, Serum 1.3  Blood Urea Nitrogen, Serum 34  Calcium,Total Serum 9.7  Carbon Dioxide, Serum 21  Chloride, Serum 107  Creatinine, Serum 1.42  eGFR if  56  eGFR if Non African American 49  Glucose, Serum 83  Potassium, Serum 4.2  Protein Total, Serum 8.0  Sodium, Serum 148    PT/INR  PT/INR  02-08-21 @ 19:26  INR 1.04  Prothrombin Time Comment --  Prothrobin Time, [id number]      Amylase/Lipase    RADIOLOGY & ADDITIONAL TESTS:    Imaging Personally Reviewed:  [ ] YES  [ ] NO    < from: CT Lumbar Spine Reform No Cont (02.08.21 @ 21:20) >  FINDINGS:  CHEST:  LUNGS AND LARGE AIRWAYS: Patent central airways. Clear lungs. PLEURA: No pleural effusion.  VESSELS: Atherosclerotic changes of the aorta and coronary arteries.  HEART: Heart size is normal. No pericardial effusion.  MEDIASTINUM AND BIJAL: Nolymphadenopathy.  CHEST WALL AND LOWER NECK: Within normal limits.    ABDOMEN AND PELVIS:  LIVER: Within normal limits.  BILE DUCTS: Normal caliber.  GALLBLADDER: Within normal limits.  SPLEEN: Within normal limits.  PANCREAS: Within normal limits.  ADRENALS: Within normal limits.  KIDNEYS/URETERS: Marked left hydroureteronephrosis to the level of the bladder without obstructing stone identified. Overlying left renal parenchymal cortical thinning compatible with a chronic process. No right hydronephrosis.    BLADDER: Marked distention of the bladder.  REPRODUCTIVE ORGANS: Enlarged prostate with nodular median lobe hypertrophy extending into the bladder base.    BOWEL: No bowel obstruction. Appendix is normal.  PERITONEUM: No ascites.  VESSELS: Atherosclerotic changes.  RETROPERITONEUM/LYMPH NODES: No lymphadenopathy.  ABDOMINAL WALL: Within normal limits.  BONES: Degenerative changes. Multiple chronic appearing left-sided rib fractures. Acute mildly displaced fracture deformity of the left inferior pubic ramus. Acute minimally displaced fracture of the junction of the left superior pubic ramus extending into the anterior column of the left acetabulum (example 3, 94).  Thoracic spine: Exaggerated kyphosis. Levocurvature. No spondylolisthesis. No acute displaced fracture. Multilevel degenerative disc disease. No evidence of acute compression deformity.  Lumbar spine: Preserved lordosis. No spondylolisthesis. Vertebral body heights are maintained. Multilevel degenerative changes, most pronounced at L5/S1, with associated spinal or foraminal stenosis not well evaluated by CT. No acute displaced fracture.    IMPRESSION:    1. No evidence of solid organ injury.    2. Acute mildly displaced fracture of the left inferior pubic ramus. Acute minimally displaced fractures of the left superior pubic ramus and left acetabulum.    3. Marked bladder distention, possibly related to an enlarged prostate. Correlation for urinary retention is recommended. Correlation with PSA is also recommended for the prostate.    4. Severe left hydroureteronephrosis, likely chronic given associated left renal cortical thinning. Follow-up CT urogram can be considered for further evaluation.    Preliminary findings were discussed with Dr. LLOYD  2/8/2021 9:21 PM by Dr. Lovell with read back confirmation. Amended findings were  discussed with Dr. Saldana by Dr. Luo at 10:39 PM on 2/8/2021.    < end of copied text >   JAMAICA LANGLEY  75y  Male    Patient is a 75y old  Male who presents with a chief complaint of fall     HPI: 75 M with no PMH presents s/p mechanical fall on Thursday when he slipped down stairs, landed on his back. He hit his head and had some bleeding however no loss of consciousness. Since then he has had difficulty ambulating. He has been having pain in the neck, chest wall, and L hip. Patient is not on any blood thinners. No fevers, chills, chest pain, shortness of breath. No n/v/diarrhea.    Of note, patient found to have L hydroureteronephrosis as well as distended bladder on imaging. Patient denies any difficulty urinating however has not urinated since ED admission. He denies any dysuria or hematuria however has been having increased urinary frequency.      REVIEW OF SYSTEMS:    CONSTITUTIONAL: + generalized weakness, no fevers or chills  HEAD: no pain  EYES/ENT: No visual changes;  No vertigo or throat pain   NECK: neck pain  RESPIRATORY: No cough, wheezing, hemoptysis; No shortness of breath  CARDIOVASCULAR: No chest pain or palpitations  GASTROINTESTINAL: No abdominal pain; nausea, vomiting;  diarrhea or constipation. No hemetemesis, melena or hematochezia.  GENITOURINARY: No dysuria or hematuria, + increased urinary frequency  NEUROLOGICAL: lower extremity weakness, no numbness   SKIN: No itching, burning, rashes, or lesions   All other review of systems is negative unless indicated above.      T(C): 36.5 (02-09-21 @ 01:25), Max: 37 (02-08-21 @ 18:50)  HR: 80 (02-09-21 @ 01:25) (80 - 91)  BP: 133/82 (02-09-21 @ 01:25) (133/82 - 159/86)  RR: 16 (02-09-21 @ 01:25) (16 - 18)  SpO2: 98% (02-09-21 @ 01:25) (97% - 100%)  Wt(kg): --Vital Signs Last 24 Hrs  T(C): 36.5 (09 Feb 2021 01:25), Max: 37 (08 Feb 2021 18:50)  T(F): 97.7 (09 Feb 2021 01:25), Max: 98.6 (08 Feb 2021 18:50)  HR: 80 (09 Feb 2021 01:25) (80 - 91)  BP: 133/82 (09 Feb 2021 01:25) (133/82 - 159/86)  BP(mean): --  RR: 16 (09 Feb 2021 01:25) (16 - 18)  SpO2: 98% (09 Feb 2021 01:25) (97% - 100%)    PHYSICAL EXAM:  GENERAL: NAD with C-collar on, cachectic elderly male  HEAD:  Atraumatic, Normocephalic  EYES: conjunctiva and sclera clear  ENMT: Dry MM  NECK: C-collar   NERVOUS SYSTEM: AOX3, 4/5 strength in RLE, 3/5 in LLE, 5/5 in b/l UE,   CHEST/LUNG: Clear to percussion bilaterally; No rales, rhonchi, wheezing, or rubs  HEART: Regular rate and rhythm; No murmurs, rubs, or gallops  ABDOMEN: Soft, Nontender, distended bladder  EXTREMITIES:  2+ Peripheral Pulses, No clubbing, cyanosis, or edema  SKIN: No rashes or lesions    Consultant(s) Notes Reviewed:  [x ] YES  [ ] NO  Care Discussed with Consultants/Other Providers [ x] YES  [ ] NO    LABS:  CBC   02-08-21 @ 19:26  Hematcorit 40.4  Hemoglobin 12.7  Mean Cell Hemoglobin 29.3  Platelet Count-Automated 308  RBC Count 4.33  Red Cell Distrib Width 13.1  Wbc Count 8.83      BMP  02-08-21 @ 19:26  Anion Gap. Serum 20  Blood Urea Nitrogen,Serm 34  Calcium, Total Serum 9.7  Carbon Dioxide, Serum 21  Chloride, Serum 107  Creatinine, Serum 1.42  eGFR in  56  eGFR in Non Afican American 49  Gloucose, serum 83  Potassium, Serum 4.2  Sodium, Serum 148    CMP  02-08-21 @ 19:26  Jenelle Aminotransferase(ALT/SGPT)12  Albumin, Serum 4.5  Alkaline Phosphatase, Serum 133  Anion Gap, Serum 20  Aspartate Aminotransferase (AST/SGOT)26  Bilirubin Total, Serum 1.3  Blood Urea Nitrogen, Serum 34  Calcium,Total Serum 9.7  Carbon Dioxide, Serum 21  Chloride, Serum 107  Creatinine, Serum 1.42  eGFR if  56  eGFR if Non African American 49  Glucose, Serum 83  Potassium, Serum 4.2  Protein Total, Serum 8.0  Sodium, Serum 148    PT/INR  PT/INR  02-08-21 @ 19:26  INR 1.04  Prothrombin Time Comment --  Prothrobin Time, [id number]      Amylase/Lipase    RADIOLOGY & ADDITIONAL TESTS:    Imaging Personally Reviewed:  [ ] YES  [ ] NO    < from: CT Lumbar Spine Reform No Cont (02.08.21 @ 21:20) >  FINDINGS:  CHEST:  LUNGS AND LARGE AIRWAYS: Patent central airways. Clear lungs. PLEURA: No pleural effusion.  VESSELS: Atherosclerotic changes of the aorta and coronary arteries.  HEART: Heart size is normal. No pericardial effusion.  MEDIASTINUM AND BIJAL: Nolymphadenopathy.  CHEST WALL AND LOWER NECK: Within normal limits.    ABDOMEN AND PELVIS:  LIVER: Within normal limits.  BILE DUCTS: Normal caliber.  GALLBLADDER: Within normal limits.  SPLEEN: Within normal limits.  PANCREAS: Within normal limits.  ADRENALS: Within normal limits.  KIDNEYS/URETERS: Marked left hydroureteronephrosis to the level of the bladder without obstructing stone identified. Overlying left renal parenchymal cortical thinning compatible with a chronic process. No right hydronephrosis.    BLADDER: Marked distention of the bladder.  REPRODUCTIVE ORGANS: Enlarged prostate with nodular median lobe hypertrophy extending into the bladder base.    BOWEL: No bowel obstruction. Appendix is normal.  PERITONEUM: No ascites.  VESSELS: Atherosclerotic changes.  RETROPERITONEUM/LYMPH NODES: No lymphadenopathy.  ABDOMINAL WALL: Within normal limits.  BONES: Degenerative changes. Multiple chronic appearing left-sided rib fractures. Acute mildly displaced fracture deformity of the left inferior pubic ramus. Acute minimally displaced fracture of the junction of the left superior pubic ramus extending into the anterior column of the left acetabulum (example 3, 94).  Thoracic spine: Exaggerated kyphosis. Levocurvature. No spondylolisthesis. No acute displaced fracture. Multilevel degenerative disc disease. No evidence of acute compression deformity.  Lumbar spine: Preserved lordosis. No spondylolisthesis. Vertebral body heights are maintained. Multilevel degenerative changes, most pronounced at L5/S1, with associated spinal or foraminal stenosis not well evaluated by CT. No acute displaced fracture.    IMPRESSION:    1. No evidence of solid organ injury.    2. Acute mildly displaced fracture of the left inferior pubic ramus. Acute minimally displaced fractures of the left superior pubic ramus and left acetabulum.    3. Marked bladder distention, possibly related to an enlarged prostate. Correlation for urinary retention is recommended. Correlation with PSA is also recommended for the prostate.    4. Severe left hydroureteronephrosis, likely chronic given associated left renal cortical thinning. Follow-up CT urogram can be considered for further evaluation.    Preliminary findings were discussed with Dr. LLOYD  2/8/2021 9:21 PM by Dr. Lovell with read back confirmation. Amended findings were  discussed with Dr. Saldana by Dr. Luo at 10:39 PM on 2/8/2021.    < end of copied text >

## 2021-02-09 NOTE — CONSULT NOTE ADULT - SUBJECTIVE AND OBJECTIVE BOX
74 yo Male with remote hx of prostate bx ( negative)  with urologist affiliated with [facility] a few years ago. Pt has not followed up. Here with fall resulting in cervical fx and pelvic fx. CT also showed distended bladder with bl hydronephrosis. Lorenzo placed in the ER with UOP of 1800cc. CT showing severe left hydroureteronephrosis to bladder with thin renal parenchyma. MRI showing mild right hydronephrosis.   Pt is poor historian with tangental story telling. Appears has had two years of nocturnal urinary incontinence, voids "maybe once a day", has some nocturia but cannot quantify. Denies flank pain, sensation of retention, hematuria, freq, urgency, freq uti.   Since fall states has been voiding on himself intentionally    PAST MEDICAL & SURGICAL HISTORY:  unknown     MEDICATIONS  (STANDING):  sodium chloride 0.9%. 1000 milliLiter(s) (75 mL/Hr) IV Continuous <Continuous>  tamsulosin 0.4 milliGRAM(s) Oral daily    MEDICATIONS  (PRN):  acetaminophen   Tablet .. 650 milliGRAM(s) Oral every 6 hours PRN Temp greater or equal to 38C (100.4F), Mild Pain (1 - 3)  HYDROmorphone   Tablet 2 milliGRAM(s) Oral every 6 hours PRN Severe Pain (7 - 10)  ondansetron Injectable 4 milliGRAM(s) IV Push every 6 hours PRN Nausea and/or Vomiting  senna 2 Tablet(s) Oral at bedtime PRN Constipation      FAMILY HISTORY:  no  malignancy     Allergies    No Known Allergies    Intolerances    SOCIAL HISTORY:  no tob       REVIEW OF SYSTEMS: Otherwise negative as stated in HPI    Physical Exam  Vital signs  T(C): 36.3 (21 @ 09:31), Max: 37 (21 @ 18:50)  HR: 79 (21 @ 09:31)  BP: 166/89 (21 @ 09:31)  SpO2: 99% (21 @ 09:31)  Wt(kg): --    Output    UOP  YELLOW   Gen:  AWAKE ALERT NAD AXOX3    Pulm:  NO RESP DISTRESS  	  CV:  S1S2    GI:  SOFT NT/ND  NO CVAT BL     :  CIRC PHALLUS   REFUSED GENITAL EXAM                           	      LABS:                            12.7   8.83  )-----------( 308      ( 2021 19:26 )             40.4         143  |  106  |  34<H>  ----------------------------<  80  4.3   |  21<L>  |  1.55<H>    Ca    9.0      2021 05:37  Mg     2.6         TPro  8.0  /  Alb  4.5  /  TBili  1.3<H>  /  DBili  x   /  AST  26  /  ALT  12  /  AlkPhos  133<H>      PT/INR - ( 2021 19:26 )   PT: 12.4 sec;   INR: 1.04 ratio         PTT - ( 2021 19:26 )  PTT:29.2 sec  Urinalysis Basic - ( 2021 03:05 )    Color: Yellow / Appearance: Clear / S.025 / pH: x  Gluc: x / Ketone: Small  / Bili: Negative / Urobili: Negative   Blood: x / Protein: 30 mg/dL / Nitrite: Negative   Leuk Esterase: Negative / RBC: 19 /hpf / WBC 1 /HPF   Sq Epi: x / Non Sq Epi: 1 /hpf / Bacteria: Negative        Urine Cx: SENT     RADIOLOGY:  < from: CT Abdomen and Pelvis w/ IV Cont (21 @ 21:20) >  FINDINGS:  CHEST:  LUNGS AND LARGE AIRWAYS: Patent central airways. Clear lungs. PLEURA: No pleural effusion.  VESSELS: Atherosclerotic changes of the aorta and coronary arteries.  HEART: Heart size is normal. No pericardial effusion.  MEDIASTINUM AND BIJAL: Nolymphadenopathy.  CHEST WALL AND LOWER NECK: Within normal limits.    ABDOMEN AND PELVIS:  LIVER: Within normal limits.  BILE DUCTS: Normal caliber.  GALLBLADDER: Within normal limits.  SPLEEN: Within normal limits.  PANCREAS: Within normal limits.  ADRENALS: Within normal limits.  KIDNEYS/URETERS: Marked left hydroureteronephrosis to the level of the bladder without obstructing stone identified. Overlying left renal parenchymal cortical thinning compatible with a chronic process. No right hydronephrosis.    BLADDER: Marked distention of the bladder.  REPRODUCTIVE ORGANS: Enlarged prostate with nodular median lobe hypertrophy extending into the bladder base.    BOWEL: No bowel obstruction. Appendix is normal.  PERITONEUM: No ascites.  VESSELS: Atherosclerotic changes.  RETROPERITONEUM/LYMPH NODES: No lymphadenopathy.  ABDOMINAL WALL: Within normal limits.  BONES: Degenerative changes. Multiple chronic appearing left-sided rib fractures. Acute mildly displaced fracture deformity of the left inferior pubic ramus. Acute minimally displaced fracture of the junction of the left superior pubic ramus extending into the anterior column of the left acetabulum (example 3, 94).  Thoracic spine: Exaggerated kyphosis. Levocurvature. No spondylolisthesis. No acute displaced fracture. Multilevel degenerative disc disease. No evidence of acute compression deformity.  Lumbar spine: Preserved lordosis. No spondylolisthesis. Vertebral body heights are maintained. Multilevel degenerative changes, most pronounced at L5/S1, with associated spinal or foraminal stenosis not well evaluated by CT. No acute displaced fracture.    IMPRESSION:    1. No evidence of solid organ injury.    2. Acute mildly displaced fracture of the left inferior pubic ramus. Acute minimally displaced fractures of the left superior pubic ramus and left acetabulum.    3. Marked bladder distention, possibly related to an enlarged prostate. Correlation for urinary retention is recommended. Correlation with PSA is also recommended for the prostate.    4. Severe left hydroureteronephrosis, likely chronic given associated left renal cortical thinning. Follow-up CT urogram can be considered for further evaluation.    < end of copied text >      < from: MR Lumbar Spine No Cont (21 @ 00:08) >  IMPRESSION: Acute impacted type II-III dens fracture without the placement of fracture fragments. No canal compression or intraspinal hemorrhage. No cord compression. Unremarkable thoracic and lumbar spine.    Incidental enlarged bladder, right-sidedhydronephrosis and left-sided hydroureteronephrosis.    < end of copied text >

## 2021-02-09 NOTE — PATIENT PROFILE ADULT - OVER THE PAST TWO WEEKS HAVE YOU FELT DOWN, DEPRESSED OR HOPELESS?
Upper Respiratory Infection (URI) in Teens: Care Instructions  Your Care Instructions  An upper respiratory infection, also called a URI, is an infection of the nose, sinuses, or throat. Viruses or bacteria can cause URIs. Colds, the flu, and sinusitis are examples of URIs. These infections are spread by coughs, sneezes, and close contact. You may need antibiotics to treat bacterial infections. Antibiotics do not help viral infections. But you can treat most infections with home care. This may include drinking lots of fluids and taking over-the-counter pain medicine. You will probably feel better in 4 to 10 days. Follow-up care is a key part of your treatment and safety. Be sure to make and go to all appointments, and call your doctor if you are having problems. It's also a good idea to know your test results and keep a list of the medicines you take. How can you care for yourself at home? · To prevent dehydration, drink plenty of fluids, enough so that your urine is light yellow or clear like water. Choose water and other caffeine-free clear liquids until you feel better. · Take an over-the-counter pain medicine, such as acetaminophen (Tylenol), ibuprofen (Advil, Motrin), or naproxen (Aleve). Read and follow all instructions on the label. · No one younger than 20 should take aspirin. It has been linked to Reye syndrome, a serious illness. · Before you use cough and cold medicines, check the label. These medicines may not be safe for young children or for people with certain health problems. · Be careful when taking over-the-counter cold or flu medicines and Tylenol at the same time. Many of these medicines have acetaminophen, which is Tylenol. Read the labels to make sure that you are not taking more than the recommended dose. Too much acetaminophen (Tylenol) can be harmful.   · Get plenty of rest.  · Use saline (saltwater) nasal washes to help keep your nasal passages open and wash out mucus and bacteria. You can buy saline nose drops at a grocery store or drugstore. Or you can make your own at home by adding 1 teaspoon of salt and 1 teaspoon of baking soda to 2 cups of distilled water. If you make your own, fill a bulb syringe with the solution, insert the tip into your nostril, and squeeze gently. Dawson Chyle your nose. · Use a vaporizer or humidifier to add moisture to your bedroom. Follow the instructions for cleaning the machine. · Do not smoke or allow others to smoke around you. If you need help quitting, talk to your doctor about stop-smoking programs and medicines. These can increase your chances of quitting for good. When should you call for help? Call 911 anytime you think you may need emergency care. For example, call if:  · You have severe trouble breathing. · You have rapid swelling of the throat or tongue. Call your doctor now or seek immediate medical care if:  · You have a fever with a stiff neck or a severe headache. · You have signs of needing more fluids. You have sunken eyes and a dry mouth, and you pass only a little dark urine. · You cannot keep down fluids or medicine. Watch closely for changes in your health, and be sure to contact your doctor if:  · You have a deep cough and a lot of mucus. · You are too tired to eat or drink. · You have a new symptom, such as a sore throat, an earache, or a rash. · You do not get better as expected. Where can you learn more? Go to http://ap-rudolph.info/. Enter A933 in the search box to learn more about \"Upper Respiratory Infection (URI) in Teens: Care Instructions. \"  Current as of: May 24, 2016  Content Version: 11.2  © 1363-4552 [employer]. Care instructions adapted under license by [employer] (which disclaims liability or warranty for this information).  If you have questions about a medical condition or this instruction, always ask your healthcare professional. Cory Arroyo disclaims any warranty or liability for your use of this information. yes

## 2021-02-09 NOTE — CHART NOTE - NSCHARTNOTEFT_GEN_A_CORE
Patient noted to have massively dilated bladder with hydronephrosis, and contrast stagnating in bladder. Patient is refusing a dutton. Rec medicine eval prior to admission to neurosurgery service. ED aware of plan. Patient noted to have massively dilated bladder with hydronephrosis, and contrast stagnating in bladder. Has recent hx of significant constipation. Patient is refusing a dutton. Rec medicine eval prior to admission to neurosurgery service. ED aware of plan.

## 2021-02-09 NOTE — CONSULT NOTE ADULT - ASSESSMENT
Assessment:  75y Male with left inferior and high superior pubic rami/ant column non displaced fractures      Plan:    No acute orthopedic surgical intervention indicated at this time.  FU NSx, pt with dens Geronimo, rossy operative management per Neurosurgery, In C Collar   WBAT LLE, assistive devices as needed   Pain control.  Ice application.  DVT prophylaxis.  Follow-up with Dr. Strickland in the office in 2 weeks; call office for appointment.   ortho stable  will d/w attending, advise with any changes  unknown

## 2021-02-09 NOTE — H&P ADULT - HISTORY OF PRESENT ILLNESS
Patient is a 75y old  Male who presents with a chief complaint of fall     HPI: 75 M with no PMH presents s/p mechanical fall on Thursday when he slipped down stairs, landed on his back. He hit his head and had some bleeding however no loss of consciousness. Since then he has had difficulty ambulating. He has been having pain in the neck, chest wall, and L hip. Patient is not on any blood thinners. No fevers, chills, chest pain, shortness of breath. No n/v/diarrhea.    Of note, patient found to have L hydroureteronephrosis as well as distended bladder on imaging. Patient denies any difficulty urinating however has not urinated since ED admission. He denies any dysuria or hematuria however has been having increased urinary frequency.

## 2021-02-09 NOTE — CONSULT NOTE ADULT - ASSESSMENT
75 M with no known PMH presents with mechanical fall found to have an impacted angulated C2 fracture extending into lateral masses involving vascular foramen bilaterally. Incidentally found to have a distended bladder and marked L hydroureteronephrosis.     # Bladder distention  - possibly 2/2 BPH  - patient agreeable to dutton at this time  - increased urinary frequency likely overflow incontinence  - obtain UA   - monitor strict Is&Os    # Severe L hydroureteronephrosis  - likely chronic given associated L renal cortical thinning   - place dutton  - consider CT urogram and urology consult     # ALEKSANDR  - in the setting of urinary obstruction vs poor po intake  - place dutton  - monitor Cr  - consider IVF     # C2 dens fracture  - obtain MRI C/T/L spine  - pain control  - f/u neurosurgery recs 75 M with no known PMH presents with mechanical fall found to have an impacted angulated C2 fracture extending into lateral masses involving vascular foramen bilaterally. Incidentally found to have a distended bladder and marked L hydroureteronephrosis.     # Bladder distention  - possibly 2/2 BPH  - patient agreeable to dutton at this time  - increased urinary frequency likely overflow incontinence  - obtain UA   - monitor strict Is&Os    # Severe L hydroureteronephrosis  - likely chronic given associated L renal cortical thinning   - place dutton  - consider CT urogram and urology consult     # ALEKSANDR  - in the setting of urinary obstruction vs poor po intake  - place dutton  - monitor Cr  - consider IVF     # C2 dens fracture  - obtain MRI C/T/L spine  - pain control  - f/u neurosurgery recs    Addendum: Patient put out 1.8L after dutton placement, will need to be monitored for post obstructive diuresis. Monitor electrolytes and start IVF for large volume output.

## 2021-02-09 NOTE — CHART NOTE - NSCHARTNOTEFT_GEN_A_CORE
CAPRINI SCORE [CLOT] Score on Admission for     AGE RELATED RISK FACTORS                                                       MOBILITY RELATED FACTORS  [ ] Age 41-60 years                                            (1 Point)                  [ ] Bed rest                                                        (1 Point)  [x ] Age: 61-74 years                                           (2 Points)                 [ ] Plaster cast                                                   (2 Points)  [ ] Age= 75 years                                              (3 Points)                 [ ] Bed bound for more than 72 hours                 (2 Points)    DISEASE RELATED RISK FACTORS                                               GENDER SPECIFIC FACTORS  [ ] Edema in the lower extremities                       (1 Point)                  [ ] Pregnancy                                                     (1 Point)  [ ] Varicose veins                                               (1 Point)                  [ ] Post-partum < 6 weeks                                   (1 Point)             [ ] BMI > 25 Kg/m2                                            (1 Point)                  [ ] Hormonal therapy  or oral contraception          (1 Point)                 [ ] Sepsis (in the previous month)                        (1 Point)                  [ ] History of pregnancy complications                 (1 point)  [ ] Pneumonia or serious lung disease                                               [ ] Unexplained or recurrent                     (1 Point)           (in the previous month)                               (1 Point)  [ ] Abnormal pulmonary function test                     (1 Point)                 SURGERY RELATED RISK FACTORS (include planned surgeries)  [ ] Acute myocardial infarction                              (1 Point)                 [ ]  Section                                             (1 Point)  [ ] Congestive heart failure (in the previous month)  (1 Point)         [ ] Minor surgery                                                  (1 Point)   [ ] Inflammatory bowel disease                             (1 Point)                 [ ] Arthroscopic surgery                                        (2 Points)  [ ] Central venous access                                      (2 Points)                [ ] General surgery lasting more than 45 minutes   (2 Points)       [ ] Stroke (in the previous month)                          (5 Points)               [ ] Elective arthroplasty                                         (5 Points)            [ ] current or past malignancy                              (2 Points)                                                                                                       HEMATOLOGY RELATED FACTORS                                                 TRAUMA RELATED RISK FACTORS  [ ] Prior episodes of VTE                                     (3 Points)                [ ] Fracture of the hip, pelvis, or leg                       (5 Points)  [ ] Positive family history for VTE                         (3 Points)                 [ ] Acute spinal cord injury (in the previous month)  (5 Points)  [ ] Prothrombin 44066 A                                     (3 Points)                 [ ] Paralysis  (less than 1 month)                             (5 Points)  [ ] Factor V Leiden                                             (3 Points)                  [ ] Multiple Trauma within 1 month                        (5 Points)  [ ] Lupus anticoagulants                                     (3 Points)                                                           [ ] Anticardiolipin antibodies                               (3 Points)                                                       [ ] High homocysteine in the blood                      (3 Points)                                             [ ] Other congenital or acquired thrombophilia      (3 Points)                                                [ ] Heparin induced thrombocytopenia                  (3 Points)                                          Total Score [      2    ]    Risk:  Very low 0   Low 1 to 2   Moderate 3 to 4   High =5       VTE Prophylasix Recommednations:  [ x] mechanical pneumatic compression devices                                      [ ] contraindicated: _____________________  [x ] chemo prophylasix                                                                                   [ ] contraindicated _____________________    **** HIGH LIKELIHOOD DVT PRESENT ON ADMISSION  [x ] (please order LE dopplers within 24 hours of admission)

## 2021-02-09 NOTE — ED ADULT NURSE REASSESSMENT NOTE - NS ED NURSE REASSESS COMMENT FT1
Lorenzo placed using sterile technique. Second RN present to confirm sterility. Explained procedure as it was being done - Pt tolerated procedure well. Sterile specimens collected and sent to lab as ordered. drained aprox 1800cc of urine. Comfort and safety provided.

## 2021-02-09 NOTE — PROVIDER CONTACT NOTE (MEDICATION) - ACTION/TREATMENT ORDERED:
explained to pt risk and benefits pt refused. no interventions done at present time will continue to monitor pt closely

## 2021-02-09 NOTE — CONSULT NOTE ADULT - ATTENDING COMMENTS
Alexis Hutchinson M.D.  [facility] 8 pm-8am  [facility]  pager#  [phone]    76 yo M with mechanical fall, difficulty ambulating, CT imaging with impacted angulated C2 dens fracture extending into the lateral masses involving the vascular foramen bilaterally, pending MRI and neurosx intervention, found to have severe L hydroureteronephrosis, appears chronic given coexisting renal cortical thinning. Patient now acquiescing to dutton placement. Will monitor U out after dutton placement, consider IV fluids for maintenance, especially if pt is to be NPO for neurosurgery procedure. Monitor Cr daily. If no improvement in ALEKSANDR, U out consider CT urogram
Patient examined. Chart and X-rays reviewed. Agree with above note.    Rico Strickland MD

## 2021-02-09 NOTE — PROGRESS NOTE ADULT - ASSESSMENT
HPI:  Patient is a 75y old  Male who presents with a chief complaint of fall     HPI: 75 M with no PMH presents s/p mechanical fall on Thursday when he slipped down stairs, landed on his back. He hit his head and had some bleeding however no loss of consciousness. Since then he has had difficulty ambulating. He has been having pain in the neck, chest wall, and L hip. Patient is not on any blood thinners. No fevers, chills, chest pain, shortness of breath. No n/v/diarrhea.    Of note, patient found to have L hydroureteronephrosis as well as distended bladder on imaging. Patient denies any difficulty urinating however has not urinated since ED admission. He denies any dysuria or hematuria however has been having increased urinary frequency.     (09 Feb 2021 03:45)  76 yo male with type 2-3 dens fracture and left pelvis fracture   PROCEDURE:  p  POD#    PLAN:  Neuro:   1 OR planning   2 bed rest for now   3 continue prn tylenol for pain     Respiratory:   1 room air     CV:  1 bp stable     Endocrine:   1 stable     Heme/Onc:  stable                DVT ppx: scds   Renal:   1 Urology consult called for retention and hydro nephrosis   2 continue dutton   3 started on flomax   4 anita- likely from retention -trend , continue on ivf   ID:   1 afebrile     GI:   1 regular diet   2 stool softener       appreciate ortho consult -WBAT on left leg   doppler :p   Social/Family:   Discharge planning: p    discussed with Dr. Derrell silverman [phone]        HPI:  Patient is a 75y old  Male who presents with a chief complaint of fall     HPI: 75 M with no PMH presents s/p mechanical fall on Thursday when he slipped down stairs, landed on his back. He hit his head and had some bleeding however no loss of consciousness. Since then he has had difficulty ambulating. He has been having pain in the neck, chest wall, and L hip. Patient is not on any blood thinners. No fevers, chills, chest pain, shortness of breath. No n/v/diarrhea.    Of note, patient found to have L hydroureteronephrosis as well as distended bladder on imaging. Patient denies any difficulty urinating however has not urinated since ED admission. He denies any dysuria or hematuria however has been having increased urinary frequency.     (09 Feb 2021 03:45)  76 yo male with type 2-3 dens fracture and left pelvis fracture   PROCEDURE:  p  POD#    PLAN:  Neuro:   1 OR planning   2 oob with c collar   3 continue prn tylenol for pain   4 collar on at all times   Respiratory:   1 room air     CV:  1 bp stable     Endocrine:   1 stable     Heme/Onc:  stable                DVT ppx: scds   Renal:   1 Urology consult called for retention and hydro nephrosis   2 continue dutton   3 started on flomax   4 anita- likely from retention -trend , continue on ivf   ID:   1 afebrile     GI:   1 regular diet   2 stool softener       appreciate ortho consult -WBAT on left leg   doppler :p   Social/Family:   Discharge planning: p    discussed with Dr. Derrell silverman [phone]        HPI:  Patient is a 75y old  Male who presents with a chief complaint of fall     HPI: 75 M with no PMH presents s/p mechanical fall on Thursday when he slipped down stairs, landed on his back. He hit his head and had some bleeding however no loss of consciousness. Since then he has had difficulty ambulating. He has been having pain in the neck, chest wall, and L hip. Patient is not on any blood thinners. No fevers, chills, chest pain, shortness of breath. No n/v/diarrhea.    Of note, patient found to have L hydroureteronephrosis as well as distended bladder on imaging. Patient denies any difficulty urinating however has not urinated since ED admission. He denies any dysuria or hematuria however has been having increased urinary frequency.     (09 Feb 2021 03:45)  74 yo male with type 2-3 dens fracture and left pelvis fracture   PROCEDURE:  p  POD#    PLAN:  Neuro:   1 OR planning   2 oob with c collar   3 continue prn tylenol for pain   4 collar on at all times   Respiratory:   1 room air     CV:  1 bp stable     Endocrine:   1 stable     Heme/Onc:  stable                DVT ppx: scds   Renal:   1 Urology consult called for retention and hydro nephrosis   2 continue dutton   3 started on flomax   4 anita- likely from retention -trend , continue on ivf   ID:   1 afebrile     GI:   1 regular diet   2 stool softener       appreciate ortho consult -WBAT on left leg   doppler :p   Social/Family:   Discharge planning: p    discussed with Dr. Dove   spectra [phone]       41  minutes were spent . More than 50 percent of time was spent on examining patient, reviewing ct scan and mri, calling urology  and discussed plan with hospitalist.

## 2021-02-09 NOTE — CONSULT NOTE ADULT - ASSESSMENT
76 yo male with urinary retention of 1800cc, bl hydronephrosis - right mld and left severe, enlarged prostate with median lobe, ALEKSANDR upon presentation with fall     keep dutton  start flomax   monitor for post obstructive diuresis ( uop >200cc/hr for 2+ hr) and replete output at half that which is coming out ideally with po liquid intake  ins and outs q 2 until UOP tapers off   repeat renal sono in 48 hrs to assess hydronephrosis.   bowel regimen    74 yo male with urinary retention of 1800cc, bl hydronephrosis - right mld and left severe, enlarged prostate with median lobe, ALEKSANDR upon presentation with fall     -Start flomax   -Monitor patient for post-obstructive diuresis.  -Maintain strict intake and output q 2 hours  -POD = >200cc of urine/hr for 3 or more consecutive hours  -Allow patient to drink to thirst.  Keep two pitchers of water at the bedside at all times.  -Check q6 BMPs to monitor electrolytes until UOP normalizes  -Continue Lorenzo catheter   -Renal sono in 48 hrs to assess hydronephrosis  -Bowel regimen with senna, colace, Miralax   74 yo male with urinary retention of 1800cc, bl hydronephrosis - right mld and left severe, enlarged prostate with median lobe, ALEKSANDR upon presentation with fall       -Start flomax   -Monitor patient for post-obstructive diuresis.  -Maintain strict intake and output q 2 hours  -POD = >200cc of urine/hr for 3 or more consecutive hours  -Allow patient to drink to thirst.  Keep two pitchers of water at the bedside at all times.  -Check q6 BMPs to monitor electrolytes until UOP normalizes  -Continue Lorenzo catheter   -Renal sono in 48 hrs to assess hydronephrosis  -Bowel regimen with senna, colace, Miralax

## 2021-02-09 NOTE — CONSULT NOTE ADULT - SUBJECTIVE AND OBJECTIVE BOX
Orthopedics Consult Note:    This is a 75y Male who presents to the ED today with c/o left hip pain, neck pain and inability to ambulate s/p MF that offucred last thursday. Pt denies any other injuries. Pt denies head trauma or LOC. Pt denies any numbness, tingling or parethesias. Pt denies fever or chills. Pt is a community ambulator without assistive devices at baseline.    Past Medical & Surgical History:  Unspecified displaced fracture of second cervical vertebra, initial encounter for closed fracture    MEWS Score    Closed odontoid fracture, initial encounter    FALL 5 DAYS AGO    90+    ALEKSANDR (acute kidney injury)    Hydronephrosis    [email or web address]        Allergies:  No Known Allergies      Vital Signs:  T(C): 36.3 (02-08-21 @ 21:22), Max: 37 (02-08-21 @ 18:50)  HR: 84 (02-08-21 @ 21:22) (84 - 91)  BP: 145/88 (02-08-21 @ 21:22) (145/88 - 159/86)  RR: 16 (02-08-21 @ 21:22) (16 - 18)  SpO2: 99% (02-08-21 @ 21:22) (97% - 100%)    Labs:      X-rays of the pelvis, Left inferior NM Fx, L ND Ant column Fx tab   CT scan of the pelvis demosntrates similar findings as XR    PE left hip:  No swelling, no ecchymosis, no erythema, skin intact, normothermic. +TTP over pubis. No groin or troch tenderness. Limited active ROM 2' pain. Unable to SLR. No pain with axial loading or log roll. Moving all toes and ankle, +EHL/FHL/TA/GS. SILT throughout. DP and PT pulses 2+.    Secondary Survey: No TTP over RUE/LUE/RLE bony prominences, SILT, palpable pulses, full/painless range of motion, compartments soft      Spine TTP over C Sp non ttp over T/L sp and ribs

## 2021-02-10 ENCOUNTER — NON-APPOINTMENT (OUTPATIENT)
Age: 76
End: 2021-02-10

## 2021-02-10 DIAGNOSIS — N17.9 ACUTE KIDNEY FAILURE, UNSPECIFIED: ICD-10-CM

## 2021-02-10 DIAGNOSIS — E83.39 OTHER DISORDERS OF PHOSPHORUS METABOLISM: ICD-10-CM

## 2021-02-10 DIAGNOSIS — N13.30 UNSPECIFIED HYDRONEPHROSIS: ICD-10-CM

## 2021-02-10 DIAGNOSIS — R33.9 RETENTION OF URINE, UNSPECIFIED: ICD-10-CM

## 2021-02-10 DIAGNOSIS — S12.110A ANTERIOR DISPLACED TYPE II DENS FRACTURE, INITIAL ENCOUNTER FOR CLOSED FRACTURE: ICD-10-CM

## 2021-02-10 LAB
ANION GAP SERPL CALC-SCNC: 10 MMOL/L — SIGNIFICANT CHANGE UP (ref 5–17)
ANION GAP SERPL CALC-SCNC: 9 MMOL/L — SIGNIFICANT CHANGE UP (ref 5–17)
APTT BLD: 28.6 SEC — SIGNIFICANT CHANGE UP (ref 27.5–35.5)
BLD GP AB SCN SERPL QL: NEGATIVE — SIGNIFICANT CHANGE UP
BUN SERPL-MCNC: 22 MG/DL — SIGNIFICANT CHANGE UP (ref 7–23)
BUN SERPL-MCNC: 27 MG/DL — HIGH (ref 7–23)
CALCIUM SERPL-MCNC: 8.1 MG/DL — LOW (ref 8.4–10.5)
CALCIUM SERPL-MCNC: 8.3 MG/DL — LOW (ref 8.4–10.5)
CHLORIDE SERPL-SCNC: 109 MMOL/L — HIGH (ref 96–108)
CHLORIDE SERPL-SCNC: 110 MMOL/L — HIGH (ref 96–108)
CK MB BLD-MCNC: 2 % — SIGNIFICANT CHANGE UP (ref 0–3.5)
CK MB CFR SERPL CALC: 3.6 NG/ML — SIGNIFICANT CHANGE UP (ref 0–6.7)
CK SERPL-CCNC: 182 U/L — SIGNIFICANT CHANGE UP (ref 30–200)
CO2 SERPL-SCNC: 23 MMOL/L — SIGNIFICANT CHANGE UP (ref 22–31)
CO2 SERPL-SCNC: 25 MMOL/L — SIGNIFICANT CHANGE UP (ref 22–31)
CREAT SERPL-MCNC: 1.28 MG/DL — SIGNIFICANT CHANGE UP (ref 0.5–1.3)
CREAT SERPL-MCNC: 1.55 MG/DL — HIGH (ref 0.5–1.3)
CULTURE RESULTS: NO GROWTH — SIGNIFICANT CHANGE UP
GLUCOSE SERPL-MCNC: 105 MG/DL — HIGH (ref 70–99)
GLUCOSE SERPL-MCNC: 146 MG/DL — HIGH (ref 70–99)
HCT VFR BLD CALC: 32.7 % — LOW (ref 39–50)
HGB BLD-MCNC: 10.4 G/DL — LOW (ref 13–17)
INR BLD: 1 RATIO — SIGNIFICANT CHANGE UP (ref 0.88–1.16)
MAGNESIUM SERPL-MCNC: 2 MG/DL — SIGNIFICANT CHANGE UP (ref 1.6–2.6)
MAGNESIUM SERPL-MCNC: 2.1 MG/DL — SIGNIFICANT CHANGE UP (ref 1.6–2.6)
MCHC RBC-ENTMCNC: 29.6 PG — SIGNIFICANT CHANGE UP (ref 27–34)
MCHC RBC-ENTMCNC: 31.8 GM/DL — LOW (ref 32–36)
MCV RBC AUTO: 93.2 FL — SIGNIFICANT CHANGE UP (ref 80–100)
NRBC # BLD: 0 /100 WBCS — SIGNIFICANT CHANGE UP (ref 0–0)
PHOSPHATE SERPL-MCNC: 2.1 MG/DL — LOW (ref 2.5–4.5)
PHOSPHATE SERPL-MCNC: 2.2 MG/DL — LOW (ref 2.5–4.5)
PLATELET # BLD AUTO: 240 K/UL — SIGNIFICANT CHANGE UP (ref 150–400)
POTASSIUM SERPL-MCNC: 3.8 MMOL/L — SIGNIFICANT CHANGE UP (ref 3.5–5.3)
POTASSIUM SERPL-MCNC: 3.8 MMOL/L — SIGNIFICANT CHANGE UP (ref 3.5–5.3)
POTASSIUM SERPL-SCNC: 3.8 MMOL/L — SIGNIFICANT CHANGE UP (ref 3.5–5.3)
POTASSIUM SERPL-SCNC: 3.8 MMOL/L — SIGNIFICANT CHANGE UP (ref 3.5–5.3)
PROTHROM AB SERPL-ACNC: 11.9 SEC — SIGNIFICANT CHANGE UP (ref 10.6–13.6)
RBC # BLD: 3.51 M/UL — LOW (ref 4.2–5.8)
RBC # FLD: 13.2 % — SIGNIFICANT CHANGE UP (ref 10.3–14.5)
RH IG SCN BLD-IMP: POSITIVE — SIGNIFICANT CHANGE UP
SARS-COV-2 IGG SERPL QL IA: NEGATIVE — SIGNIFICANT CHANGE UP
SARS-COV-2 IGM SERPL IA-ACNC: <0.1 INDEX — SIGNIFICANT CHANGE UP
SODIUM SERPL-SCNC: 143 MMOL/L — SIGNIFICANT CHANGE UP (ref 135–145)
SODIUM SERPL-SCNC: 143 MMOL/L — SIGNIFICANT CHANGE UP (ref 135–145)
SPECIMEN SOURCE: SIGNIFICANT CHANGE UP
TROPONIN T, HIGH SENSITIVITY RESULT: 20 NG/L — SIGNIFICANT CHANGE UP (ref 0–51)
WBC # BLD: 8.23 K/UL — SIGNIFICANT CHANGE UP (ref 3.8–10.5)
WBC # FLD AUTO: 8.23 K/UL — SIGNIFICANT CHANGE UP (ref 3.8–10.5)

## 2021-02-10 PROCEDURE — 93970 EXTREMITY STUDY: CPT | Mod: 26

## 2021-02-10 PROCEDURE — 76770 US EXAM ABDO BACK WALL COMP: CPT | Mod: 26

## 2021-02-10 PROCEDURE — 99233 SBSQ HOSP IP/OBS HIGH 50: CPT | Mod: 57

## 2021-02-10 PROCEDURE — 93010 ELECTROCARDIOGRAM REPORT: CPT

## 2021-02-10 PROCEDURE — 99233 SBSQ HOSP IP/OBS HIGH 50: CPT

## 2021-02-10 RX ORDER — LANOLIN ALCOHOL/MO/W.PET/CERES
3 CREAM (GRAM) TOPICAL AT BEDTIME
Refills: 0 | Status: DISCONTINUED | OUTPATIENT
Start: 2021-02-10 | End: 2021-02-15

## 2021-02-10 RX ADMIN — HYDROMORPHONE HYDROCHLORIDE 2 MILLIGRAM(S): 2 INJECTION INTRAMUSCULAR; INTRAVENOUS; SUBCUTANEOUS at 14:13

## 2021-02-10 RX ADMIN — TAMSULOSIN HYDROCHLORIDE 0.4 MILLIGRAM(S): 0.4 CAPSULE ORAL at 11:18

## 2021-02-10 RX ADMIN — SODIUM CHLORIDE 75 MILLILITER(S): 9 INJECTION INTRAMUSCULAR; INTRAVENOUS; SUBCUTANEOUS at 23:49

## 2021-02-10 RX ADMIN — Medication 650 MILLIGRAM(S): at 02:34

## 2021-02-10 RX ADMIN — Medication 650 MILLIGRAM(S): at 14:13

## 2021-02-10 NOTE — PROGRESS NOTE ADULT - SUBJECTIVE AND OBJECTIVE BOX
Colton Kongpinkyburton   Pager [phone]  Office [phone]      CC: Patient is a 76y old  Male who presents with a chief complaint of Patient was admitted after a fall with type 2 dens fracture and left pelvic fracture (10 Feb 2021 11:26)      SUBJECTIVE / OVERNIGHT EVENTS:    MEDICATIONS  (STANDING):  potassium phosphate / sodium phosphate Powder (PHOS-NaK) 1 Packet(s) Oral once  sodium chloride 0.9%. 1000 milliLiter(s) (75 mL/Hr) IV Continuous <Continuous>  tamsulosin 0.4 milliGRAM(s) Oral daily    MEDICATIONS  (PRN):  acetaminophen   Tablet .. 650 milliGRAM(s) Oral every 6 hours PRN Temp greater or equal to 38C (100.4F), Mild Pain (1 - 3)  HYDROmorphone   Tablet 2 milliGRAM(s) Oral every 6 hours PRN Severe Pain (7 - 10)  ondansetron Injectable 4 milliGRAM(s) IV Push every 6 hours PRN Nausea and/or Vomiting  senna 2 Tablet(s) Oral at bedtime PRN Constipation      Vital Signs Last 24 Hrs  T(C): 36.7 (10 Feb 2021 09:09), Max: 37 (2021 17:00)  T(F): 98.1 (10 Feb 2021 09:09), Max: 98.6 (2021 17:00)  HR: 90 (10 Feb 2021 09:09) (77 - 90)  BP: 158/80 (10 Feb 2021 09:09) (125/73 - 162/74)  BP(mean): --  RR: 18 (10 Feb 2021 09:09) (18 - 18)  SpO2: 98% (10 Feb 2021 09:09) (96% - 99%)  CAPILLARY BLOOD GLUCOSE        I&O's Summary    2021 07:01  -  10 Feb 2021 07:00  --------------------------------------------------------  IN: 1780 mL / OUT: 1150 mL / NET: 630 mL      tele:    PHYSICAL EXAM:    GENERAL: NAD   HEENT: EOMI, PERRL  PULM: Clear to auscultation bilaterally  CV: Regular rate and rhythm; nl S1, S2; No murmurs, rubs, or gallops  ABDOMEN: Soft, Nontender, Nondistended; Bowel sounds present  EXTREMITIES/MSK:  No edema, calf tenderness   PSYCH: AAOx3  NEUROLOGY: non-focal          LABS:                        10.4   8.23  )-----------( 240      ( 10 Feb 2021 07:19 )             32.7     02-10    143  |  110<H>  |  27<H>  ----------------------------<  105<H>  3.8   |  23  |  1.55<H>    Ca    8.3<L>      10 Feb 2021 07:19  Phos  2.2     02-10  Mg     2.1     02-10    TPro  8.0  /  Alb  4.5  /  TBili  1.3<H>  /  DBili  x   /  AST  26  /  ALT  12  /  AlkPhos  133<H>  02-08    PT/INR - ( 10 Feb 2021 09:03 )   PT: 11.9 sec;   INR: 1.00 ratio         PTT - ( 10 Feb 2021 09:03 )  PTT:28.6 sec  CARDIAC MARKERS ( 10 Feb 2021 07:19 )  x     / x     / 182 U/L / x     / 3.6 ng/mL  CARDIAC MARKERS ( 2021 20:18 )  x     / x     / 204 U/L / x     / x          Urinalysis Basic - ( 2021 03:05 )    Color: Yellow / Appearance: Clear / S.025 / pH: x  Gluc: x / Ketone: Small  / Bili: Negative / Urobili: Negative   Blood: x / Protein: 30 mg/dL / Nitrite: Negative   Leuk Esterase: Negative / RBC: 19 /hpf / WBC 1 /HPF   Sq Epi: x / Non Sq Epi: 1 /hpf / Bacteria: Negative          Culture - Urine (collected 2021 05:21)  Source: .Urine Clean Catch (Midstream)  Final Report (10 Feb 2021 00:37):    No growth      RADIOLOGY & ADDITIONAL TESTS:    Imaging Personally Reviewed:    Consultant(s) Notes Reviewed:      Care Discussed with Consultants/Other Providers:   Colton Coffey   Pager [phone]  Office [phone]      CC: Patient is a 76y old  Male who presents with a chief complaint of Patient was admitted after a fall with type 2 dens fracture and left pelvic fracture (10 Feb 2021 11:26)      SUBJECTIVE / OVERNIGHT EVENTS: pt reports mechanical fall without CP/SOB/LOC. Denies typical or atypical CP.    MEDICATIONS  (STANDING):  potassium phosphate / sodium phosphate Powder (PHOS-NaK) 1 Packet(s) Oral once  sodium chloride 0.9%. 1000 milliLiter(s) (75 mL/Hr) IV Continuous <Continuous>  tamsulosin 0.4 milliGRAM(s) Oral daily    MEDICATIONS  (PRN):  acetaminophen   Tablet .. 650 milliGRAM(s) Oral every 6 hours PRN Temp greater or equal to 38C (100.4F), Mild Pain (1 - 3)  HYDROmorphone   Tablet 2 milliGRAM(s) Oral every 6 hours PRN Severe Pain (7 - 10)  ondansetron Injectable 4 milliGRAM(s) IV Push every 6 hours PRN Nausea and/or Vomiting  senna 2 Tablet(s) Oral at bedtime PRN Constipation      Vital Signs Last 24 Hrs  T(C): 36.7 (10 Feb 2021 09:09), Max: 37 (2021 17:00)  T(F): 98.1 (10 Feb 2021 09:09), Max: 98.6 (2021 17:00)  HR: 90 (10 Feb 2021 09:09) (77 - 90)  BP: 158/80 (10 Feb 2021 09:09) (125/73 - 162/74)  BP(mean): --  RR: 18 (10 Feb 2021 09:09) (18 - 18)  SpO2: 98% (10 Feb 2021 09:09) (96% - 99%)  CAPILLARY BLOOD GLUCOSE        I&O's Summary    2021 07:01  -  10 Feb 2021 07:00  --------------------------------------------------------  IN: 1780 mL / OUT: 1150 mL / NET: 630 mL          PHYSICAL EXAM:    GENERAL: NAD   HEENT: EOMI, PERRL  PULM: Clear to auscultation bilaterally  CV: Regular rate and rhythm; nl S1, S2; mild 2/6 sandeep  ABDOMEN: Soft, Nontender, Nondistended; Bowel sounds present  EXTREMITIES/MSK:  No edema, calf tenderness   PSYCH: AAOx3  NEUROLOGY: non-focal          LABS:                        10.4   8.23  )-----------( 240      ( 10 Feb 2021 07:19 )             32.7     02-10    143  |  110<H>  |  27<H>  ----------------------------<  105<H>  3.8   |  23  |  1.55<H>    Ca    8.3<L>      10 Feb 2021 07:19  Phos  2.2     02-10  Mg     2.1     02-10    TPro  8.0  /  Alb  4.5  /  TBili  1.3<H>  /  DBili  x   /  AST  26  /  ALT  12  /  AlkPhos  133<H>  02-08    PT/INR - ( 10 Feb 2021 09:03 )   PT: 11.9 sec;   INR: 1.00 ratio         PTT - ( 10 Feb 2021 09:03 )  PTT:28.6 sec  CARDIAC MARKERS ( 10 Feb 2021 07:19 )  x     / x     / 182 U/L / x     / 3.6 ng/mL  CARDIAC MARKERS ( 2021 20:18 )  x     / x     / 204 U/L / x     / x          Urinalysis Basic - ( 2021 03:05 )    Color: Yellow / Appearance: Clear / S.025 / pH: x  Gluc: x / Ketone: Small  / Bili: Negative / Urobili: Negative   Blood: x / Protein: 30 mg/dL / Nitrite: Negative   Leuk Esterase: Negative / RBC: 19 /hpf / WBC 1 /HPF   Sq Epi: x / Non Sq Epi: 1 /hpf / Bacteria: Negative          Culture - Urine (collected 2021 05:21)  Source: .Urine Clean Catch (Midstream)  Final Report (10 Feb 2021 00:37):    No growth      RADIOLOGY & ADDITIONAL TESTS:    Imaging Personally Reviewed:    Consultant(s) Notes Reviewed:      Care Discussed with Consultants/Other Providers: neurosurg regard cards consult for EKG changes

## 2021-02-10 NOTE — PROGRESS NOTE ADULT - ASSESSMENT
HPI:  Patient is a 75y old  Male who presents with a chief complaint of fall     HPI: 75 M with no PMH presents s/p mechanical fall on Thursday when he slipped down stairs, landed on his back. He hit his head and had some bleeding however no loss of consciousness. Since then he has had difficulty ambulating. He has been having pain in the neck, chest wall, and L hip. Patient is not on any blood thinners. No fevers, chills, chest pain, shortness of breath. No n/v/diarrhea.    Of note, patient found to have L hydroureteronephrosis as well as distended bladder on imaging. Patient denies any difficulty urinating however has not urinated since ED admission. He denies any dysuria or hematuria however has been having increased urinary frequency.     (09 Feb 2021 03:45)    74 yo male with type 2-3 dens fracture and left pelvis fracture   PROCEDURE:  p  POD#    PLAN:  Neuro:   1 OR planning -pre op for am   2 oob with c collar   3 continue prn tylenol for pain   4 collar on at all times   Respiratory:   1 room air     CV:  1 bp stable     Endocrine:   1 stable     Heme/Onc:  stable                DVT ppx: scds   Renal:   1 Urology consult appreciated-monitor for increased urine output    2 continue dutton for retention   3 started on flomax   4 anita- likely from retention -trend , continue on ivf   ID:   1 afebrile     GI:   1 regular diet   2 stool softener       appreciate ortho consult -WBAT on left leg   doppler :p   Social/Family:   Discharge planning: p    discussed with Dr. Dove   spectra [phone]     npo after midnight   medicne clearance :p   cardiology clearance :p   pre op labs are done   renal us and le dopplers :p     41  minutes were spent . More than 50 percent of time was spent on examining patient, reviewing ct scan and mri, calling urology  and discussed plan with hospitalist.

## 2021-02-10 NOTE — PROGRESS NOTE ADULT - SUBJECTIVE AND OBJECTIVE BOX
SUBJECTIVE:   Patient was seen and evaluated at bedside. Patient is resting in bed and is in no new acute distress.   OVERNIGHT EVENTS:   none   Vital Signs Last 24 Hrs  T(C): 36.7 (10 Feb 2021 09:09), Max: 37 (09 Feb 2021 17:00)  T(F): 98.1 (10 Feb 2021 09:09), Max: 98.6 (09 Feb 2021 17:00)  HR: 90 (10 Feb 2021 09:09) (77 - 90)  BP: 158/80 (10 Feb 2021 09:09) (125/73 - 162/74)  BP(mean): --  RR: 18 (10 Feb 2021 09:09) (18 - 18)  SpO2: 98% (10 Feb 2021 09:09) (96% - 99%)    PHYSICAL EXAM:    General: No Acute Distress     Neurological: Awake, alert oriented to person, place and time, Following Commands, PERRL, EOMI, Face Symmetrical, Speech Fluent, Moving all extremities, Muscle Strength -uppers full strength and lowers lle df/pf 5/5, hf 2/5 pain limited kf ke 3/5 pain limited and rle wnl , sensation wnl     Pulmonary: Clear to Auscultation, No Rales, No Rhonchi, No Wheezes     Cardiovascular: S1, S2, Regular Rate and Rhythm     Gastrointestinal: Soft, Nontender, Nondistended     Incision:     LABS:                        10.4   8.23  )-----------( 240      ( 10 Feb 2021 07:19 )             32.7    02-10    143  |  110<H>  |  27<H>  ----------------------------<  105<H>  3.8   |  23  |  1.55<H>    Ca    8.3<L>      10 Feb 2021 07:19  Phos  2.2     02-10  Mg     2.1     02-10    TPro  8.0  /  Alb  4.5  /  TBili  1.3<H>  /  DBili  x   /  AST  26  /  ALT  12  /  AlkPhos  133<H>  02-08  PT/INR - ( 10 Feb 2021 09:03 )   PT: 11.9 sec;   INR: 1.00 ratio         PTT - ( 10 Feb 2021 09:03 )  PTT:28.6 sec      02-09 @ 07:01  -  02-10 @ 07:00  --------------------------------------------------------  IN: 1780 mL / OUT: 1150 mL / NET: 630 mL      DRAINS:     MEDICATIONS:  Antibiotics:    Neuro:  acetaminophen   Tablet .. 650 milliGRAM(s) Oral every 6 hours PRN Temp greater or equal to 38C (100.4F), Mild Pain (1 - 3)  HYDROmorphone   Tablet 2 milliGRAM(s) Oral every 6 hours PRN Severe Pain (7 - 10)  ondansetron Injectable 4 milliGRAM(s) IV Push every 6 hours PRN Nausea and/or Vomiting    Cardiac:  tamsulosin 0.4 milliGRAM(s) Oral daily    Pulm:    GI/:  senna 2 Tablet(s) Oral at bedtime PRN Constipation    Other:   potassium phosphate / sodium phosphate Powder (PHOS-NaK) 1 Packet(s) Oral once  sodium chloride 0.9%. 1000 milliLiter(s) IV Continuous <Continuous>    DIET: [] Regular [] CCD [] Renal [] Puree [] Dysphagia [] Tube Feeds:   regular   IMAGING:   no new imaging today

## 2021-02-10 NOTE — CONSULT NOTE ADULT - SUBJECTIVE AND OBJECTIVE BOX
CHIEF COMPLAINT:Patient is a 76y old  Male who presents with a chief complaint of Patient was admitted after a fall with type 2 dens fracture and left pelvic fracture (10 Feb 2021 11:26)      HISTORY OF PRESENT ILLNESS:    76 male with history as below     PAST MEDICAL & SURGICAL HISTORY:          MEDICATIONS:  tamsulosin 0.4 milliGRAM(s) Oral daily        acetaminophen   Tablet .. 650 milliGRAM(s) Oral every 6 hours PRN  HYDROmorphone   Tablet 2 milliGRAM(s) Oral every 6 hours PRN  ondansetron Injectable 4 milliGRAM(s) IV Push every 6 hours PRN    senna 2 Tablet(s) Oral at bedtime PRN      potassium phosphate / sodium phosphate Powder (PHOS-NaK) 1 Packet(s) Oral once  sodium chloride 0.9%. 1000 milliLiter(s) IV Continuous <Continuous>      FAMILY HISTORY:      Non-contributory    SOCIAL HISTORY:    No tobacco, drugs or etoh    Allergies    No Known Allergies    Intolerances    	    REVIEW OF SYSTEMS:  as above  The rest of the 14 points ROS reviewed and except above they are unremarkable.        PHYSICAL EXAM:  T(C): 36.7 (02-10-21 @ 14:15), Max: 37 (02-09-21 @ 17:00)  HR: 98 (02-10-21 @ 14:15) (77 - 98)  BP: 156/78 (02-10-21 @ 14:15) (125/73 - 162/74)  RR: 18 (02-10-21 @ 14:15) (18 - 18)  SpO2: 98% (02-10-21 @ 14:15) (96% - 99%)  Wt(kg): --  I&O's Summary    09 Feb 2021 07:01  -  10 Feb 2021 07:00  --------------------------------------------------------  IN: 1780 mL / OUT: 1150 mL / NET: 630 mL    10 Feb 2021 07:01  -  10 Feb 2021 15:21  --------------------------------------------------------  IN: 477 mL / OUT: 800 mL / NET: -323 mL        Appearance: Normal	  HEENT:   no gross abnormality   Cardiovascular: Normal S1 S2,    Murmur:   Neck: JVP normal  Respiratory: Lungs clear   Gastrointestinal:  Soft, Non-tender  Skin: normal   Neuro: No gross deficits.   Psychiatry:  Mood & affect flat  Ext: No edema    LABS/DATA:    TELEMETRY: 	    ECG:  	   	  CARDIAC MARKERS:                        20 <<== 02-10-21 @ 07:19                              10.4   8.23  )-----------( 240      ( 10 Feb 2021 07:19 )             32.7     02-10    143  |  110<H>  |  27<H>  ----------------------------<  105<H>  3.8   |  23  |  1.55<H>    Ca    8.3<L>      10 Feb 2021 07:19  Phos  2.2     02-10  Mg     2.1     02-10    TPro  8.0  /  Alb  4.5  /  TBili  1.3<H>  /  DBili  x   /  AST  26  /  ALT  12  /  AlkPhos  133<H>  02-08    proBNP:   Lipid Profile:   HgA1c:   TSH:            CHIEF COMPLAINT:Patient is a 76y old  Male who presents with a chief complaint of Patient was admitted after a fall with type 2 dens fracture and left pelvic fracture (10 Feb 2021 11:26)      HISTORY OF PRESENT ILLNESS:    76 male with history as below no cardiac history in the past , has mechanical fall after tripping over a pair of boots now with cervical spine fx Dens fx planned for urgent surgery   pt denies any chest pain, sob, palpitation, dizziness or syncope.  PTA pt has exercise capacity is greater than 4 METs.     PAST MEDICAL & SURGICAL HISTORY:          MEDICATIONS:  tamsulosin 0.4 milliGRAM(s) Oral daily        acetaminophen   Tablet .. 650 milliGRAM(s) Oral every 6 hours PRN  HYDROmorphone   Tablet 2 milliGRAM(s) Oral every 6 hours PRN  ondansetron Injectable 4 milliGRAM(s) IV Push every 6 hours PRN    senna 2 Tablet(s) Oral at bedtime PRN      potassium phosphate / sodium phosphate Powder (PHOS-NaK) 1 Packet(s) Oral once  sodium chloride 0.9%. 1000 milliLiter(s) IV Continuous <Continuous>      FAMILY HISTORY:      Non-contributory    SOCIAL HISTORY:    No tobacco, drugs or etoh    Allergies    No Known Allergies    Intolerances    	    REVIEW OF SYSTEMS:  as above  The rest of the 14 points ROS reviewed and except above they are unremarkable.        PHYSICAL EXAM:  T(C): 36.7 (02-10-21 @ 14:15), Max: 37 (02-09-21 @ 17:00)  HR: 98 (02-10-21 @ 14:15) (77 - 98)  BP: 156/78 (02-10-21 @ 14:15) (125/73 - 162/74)  RR: 18 (02-10-21 @ 14:15) (18 - 18)  SpO2: 98% (02-10-21 @ 14:15) (96% - 99%)  Wt(kg): --  I&O's Summary    09 Feb 2021 07:01  -  10 Feb 2021 07:00  --------------------------------------------------------  IN: 1780 mL / OUT: 1150 mL / NET: 630 mL    10 Feb 2021 07:01  -  10 Feb 2021 15:21  --------------------------------------------------------  IN: 477 mL / OUT: 800 mL / NET: -323 mL      JVP: Normal  Neck: supple  Lung: clear   CV: S1 S2 , Murmur:  Abd: soft  Ext: No edema  neuro: Awake / alert  Psych: flat affect  Skin: normal    LABS/DATA:    TELEMETRY: 	    ECG:  	sinus, Apcs, nonspecific st wave abn    	  CARDIAC MARKERS:      < from: MR Cervical Spine No Cont (02.09.21 @ 00:04) >  INTERPRETATION:  CLINICAL INDICATION: Post fall, dens and fracture    Magnetic resonance imaging of the cervical, thoracic, and lumbosacral spine was carried out with sagittal surface coil imaging from C1 to coccyx using T1 and fast spin echo T2 weighted images with and without fat saturation technique with axial T1 and fast spin echo T2 weighted imaging on a 1.5 Sarah magnet.    Comparison is made with prior cervical spine CT from earlier today.    There is an angulated impacted acute type II-III fracture of the dens extending into the body of C2 withoutdisplacement at the fracture fragments. There is no bony retropulsion or canal compression. There is no hemorrhage. Minimal prevertebral soft tissue swelling is noted.    The cervical vertebrae are otherwise normal in height and signal characteristics. Small disc osteophyte complexes are present at C4-5 C5-6 and C6-7 causing mild spinal stenosis without cord compression.    Degenerative vertebral joint and facet hypertrophy is identified.    Evaluation of the thoracic spine the thoracic vertebralbodies are normal in height and signal characteristics. There is a moderate levoscoliosis. The thoracic cord is normal in size, contour, and signal characteristics. The conus terminates normally at the L1-2 level.        The lumbar vertebral bodies are normal in height and signal intensity. There is no fracture or dislocation. There is no cauda equina compression.    Incidental note is made of a markedly enlarged bladder and bilateral hydronephrosis as well as left-sided hydroureteronephrosis.    IMPRESSION: Acute impacted type II-III dens fracture without the placement of fracture fragments. No canal compression or intraspinal hemorrhage. No cord compression. Unremarkable thoracic and lumbar spine.    Incidental enlarged bladder, right-sidedhydronephrosis and left-sided hydroureteronephrosis.    < end of copied text >                    20 <<== 02-10-21 @ 07:19                              10.4   8.23  )-----------( 240      ( 10 Feb 2021 07:19 )             32.7     02-10    143  |  110<H>  |  27<H>  ----------------------------<  105<H>  3.8   |  23  |  1.55<H>    Ca    8.3<L>      10 Feb 2021 07:19  Phos  2.2     02-10  Mg     2.1     02-10    TPro  8.0  /  Alb  4.5  /  TBili  1.3<H>  /  DBili  x   /  AST  26  /  ALT  12  /  AlkPhos  133<H>  02-08    proBNP:   Lipid Profile:   HgA1c:   TSH:       < from: CT Chest w/ IV Cont (02.08.21 @ 21:00) >  IMPRESSION:    1. No evidence of solid organ injury.    2. Acute mildly displaced fracture of the left inferior pubic ramus. Acute minimally displaced fractures of the left superior pubic ramus and left acetabulum.    3. Marked bladder distention, possibly related to an enlarged prostate. Correlation for urinary retention is recommended. Correlation with PSA is also recommended for the prostate.    4. Severe left hydroureteronephrosis, likely chronic given associated left renal cortical thinning. Follow-up CT urogram can be considered for further evaluation.    Preliminary findings were discussed with Dr. LLOYD  2/8/2021 9:21 PM by Dr. Lovell with read back confirmation. Amended findings were  discussed with Dr. Saldana by Dr. Luo at 10:39 PM on 2/8/2021.      < end of copied text >

## 2021-02-10 NOTE — CONSULT NOTE ADULT - ASSESSMENT
Cervical spine fx  Dens fx  Brace  planned for surgery   pain control     HTN  for now will monitor and treat post op if neede    CKD  ? due to urinary retention / hydronephrosis   s/p Lorenzo   fu with     Based on current ACC/AHA guidelines, patient history and physical exam, the patient is considered to have low cardiac risk  pt denies any chest pain, sob, palpitation, dizziness or syncope.  exercise capacity is greater than 4 METs.  can proceed to OR for this urgent surgery      Advanced care planning was discussed with patient and family.  Risks, benefits and alternatives of the cardiac treatments and medical therapy including procedures were discussed in detail and all questions were answered. Importance of compliance with medical therapy and lifestyle modification to improve cardiovascular health were addressed. Appropriate forms and patient educational materials were reviewed. 30 minutes face to face spent.

## 2021-02-10 NOTE — PROGRESS NOTE ADULT - ASSESSMENT
75 M with h/o constipation presents with mechanical fall found to have an impacted angulated C2 fracture extending into lateral masses involving vascular foramen bilaterally. Incidentally found to have a distended bladder and marked L hydroureteronephrosis.

## 2021-02-10 NOTE — PROGRESS NOTE ADULT - PROBLEM SELECTOR PLAN 1
would consult cards given slight st depressions in II, III, aVF. medically optimized for planned procedure. would try to avoid hypotensive episodes given ALEKSANDR. would consult cards given slight st depressions in II, III, aVF. medically optimized for planned procedure if optimized from cards perspective. would try to avoid hypotensive episodes given ALEKSANDR.

## 2021-02-10 NOTE — PROGRESS NOTE ADULT - PROBLEM SELECTOR PLAN 2
would try to avoid hypotensive episodes given ALEKSANDR. pt denies h/o CKD but reports seeing a urologist for an enlarged prostate. will try and contact PMD for recent labs. would try to avoid hypotensive episodes given ALEKSANDR. pt denies h/o CKD but reports seeing a urologist for an enlarged prostate. will try and contact PMD for recent labs. avoid NSAIDs. pt also received contrast on admission. monitor creat and UO closely.

## 2021-02-11 ENCOUNTER — APPOINTMENT (OUTPATIENT)
Dept: SPINE | Facility: HOSPITAL | Age: 76
End: 2021-02-11

## 2021-02-11 DIAGNOSIS — Z29.9 ENCOUNTER FOR PROPHYLACTIC MEASURES, UNSPECIFIED: ICD-10-CM

## 2021-02-11 DIAGNOSIS — I10 ESSENTIAL (PRIMARY) HYPERTENSION: ICD-10-CM

## 2021-02-11 LAB
ANION GAP SERPL CALC-SCNC: 9 MMOL/L — SIGNIFICANT CHANGE UP (ref 5–17)
BUN SERPL-MCNC: 19 MG/DL — SIGNIFICANT CHANGE UP (ref 7–23)
CALCIUM SERPL-MCNC: 8.2 MG/DL — LOW (ref 8.4–10.5)
CHLORIDE SERPL-SCNC: 110 MMOL/L — HIGH (ref 96–108)
CO2 SERPL-SCNC: 24 MMOL/L — SIGNIFICANT CHANGE UP (ref 22–31)
CREAT SERPL-MCNC: 1.28 MG/DL — SIGNIFICANT CHANGE UP (ref 0.5–1.3)
GLUCOSE SERPL-MCNC: 112 MG/DL — HIGH (ref 70–99)
MAGNESIUM SERPL-MCNC: 2 MG/DL — SIGNIFICANT CHANGE UP (ref 1.6–2.6)
MRSA PCR RESULT.: SIGNIFICANT CHANGE UP
PHOSPHATE SERPL-MCNC: 2.2 MG/DL — LOW (ref 2.5–4.5)
POTASSIUM SERPL-MCNC: 4 MMOL/L — SIGNIFICANT CHANGE UP (ref 3.5–5.3)
POTASSIUM SERPL-SCNC: 4 MMOL/L — SIGNIFICANT CHANGE UP (ref 3.5–5.3)
S AUREUS DNA NOSE QL NAA+PROBE: SIGNIFICANT CHANGE UP
SARS-COV-2 RNA SPEC QL NAA+PROBE: SIGNIFICANT CHANGE UP
SODIUM SERPL-SCNC: 143 MMOL/L — SIGNIFICANT CHANGE UP (ref 135–145)

## 2021-02-11 PROCEDURE — 99232 SBSQ HOSP IP/OBS MODERATE 35: CPT

## 2021-02-11 PROCEDURE — 99233 SBSQ HOSP IP/OBS HIGH 50: CPT

## 2021-02-11 RX ORDER — HYDRALAZINE HCL 50 MG
5 TABLET ORAL ONCE
Refills: 0 | Status: COMPLETED | OUTPATIENT
Start: 2021-02-11 | End: 2021-02-11

## 2021-02-11 RX ORDER — HEPARIN SODIUM 5000 [USP'U]/ML
5000 INJECTION INTRAVENOUS; SUBCUTANEOUS EVERY 12 HOURS
Refills: 0 | Status: DISCONTINUED | OUTPATIENT
Start: 2021-02-11 | End: 2021-02-14

## 2021-02-11 RX ORDER — SODIUM,POTASSIUM PHOSPHATES 278-250MG
1 POWDER IN PACKET (EA) ORAL
Refills: 0 | Status: COMPLETED | OUTPATIENT
Start: 2021-02-11 | End: 2021-02-12

## 2021-02-11 RX ADMIN — TAMSULOSIN HYDROCHLORIDE 0.4 MILLIGRAM(S): 0.4 CAPSULE ORAL at 12:49

## 2021-02-11 RX ADMIN — HYDROMORPHONE HYDROCHLORIDE 2 MILLIGRAM(S): 2 INJECTION INTRAMUSCULAR; INTRAVENOUS; SUBCUTANEOUS at 21:04

## 2021-02-11 RX ADMIN — Medication 5 MILLIGRAM(S): at 16:25

## 2021-02-11 RX ADMIN — Medication 3 MILLIGRAM(S): at 00:08

## 2021-02-11 RX ADMIN — HEPARIN SODIUM 5000 UNIT(S): 5000 INJECTION INTRAVENOUS; SUBCUTANEOUS at 16:25

## 2021-02-11 RX ADMIN — Medication 650 MILLIGRAM(S): at 16:25

## 2021-02-11 RX ADMIN — Medication 1 PACKET(S): at 16:25

## 2021-02-11 RX ADMIN — Medication 5 MILLIGRAM(S): at 10:39

## 2021-02-11 RX ADMIN — HYDROMORPHONE HYDROCHLORIDE 2 MILLIGRAM(S): 2 INJECTION INTRAMUSCULAR; INTRAVENOUS; SUBCUTANEOUS at 05:36

## 2021-02-11 RX ADMIN — HYDROMORPHONE HYDROCHLORIDE 2 MILLIGRAM(S): 2 INJECTION INTRAMUSCULAR; INTRAVENOUS; SUBCUTANEOUS at 12:49

## 2021-02-11 NOTE — PROGRESS NOTE ADULT - PROBLEM SELECTOR PLAN 1
medically optimized for planned procedure if optimized from cards perspective. would try to avoid hypotensive episodes given ALEKSANDR.

## 2021-02-11 NOTE — PROGRESS NOTE ADULT - SUBJECTIVE AND OBJECTIVE BOX
UROLOGY Progress Note  [facility]    S: Patient seen at bedside, resting comfortably.   Lorenzo clear, ALEKSANDR improved, outputs decreased.     O:  T(C): 36.8 (02-11-21 @ 05:29), Max: 36.8 (02-11-21 @ 05:29)  HR: 82 (02-11-21 @ 05:29) (82 - 100)  BP: 150/80 (02-11-21 @ 05:29) (123/76 - 171/96)  RR: 18 (02-11-21 @ 05:29) (18 - 18)  SpO2: 98% (02-11-21 @ 05:29) (95% - 98%)        Physical Exam:  Gen: NAD  Neuro: Follows commands, C-collar in place  Resp: No acute respiratory distress, normal effort  Abd: Soft, NT, ND  : Lorenzo clear yellow    Labs:  CBC (02-10 @ 07:19)                              10.4<L>                         8.23    )----------------(  240        --    % Neutrophils, --    % Lymphocytes, ANC: --                                  32.7<L>                BMP (02-11 @ 04:44)             143     |  110<H>  |  19    		Ca++ --      Ca 8.2<L>             ---------------------------------( 112<H>		Mg 2.0                4.0     |  24      |  1.28  			Ph 2.2<L>  BMP (02-10 @ 20:56)             143     |  109<H>  |  22    		Ca++ --      Ca 8.1<L>             ---------------------------------( 146<H>		Mg 2.0                3.8     |  25      |  1.28  			Ph 2.1<L>      Coags (02-10 @ 09:03)  aPTT 28.6 / INR 1.00 / PT 11.9        -> .Urine Clean Catch (Midstream) Culture (02-09 @ 05:21)     NG    NG    No growth

## 2021-02-11 NOTE — PROGRESS NOTE ADULT - PROBLEM SELECTOR PLAN 2
improved with dutton. would try to avoid hypotensive episodes given ALEKSANDR. pt denies h/o CKD but reports seeing a urologist for an enlarged prostate. avoid NSAIDs. pt also received contrast on admission. monitor creat and UO closely.

## 2021-02-11 NOTE — PROGRESS NOTE ADULT - ASSESSMENT
Cervical spine fx  Dens fx  Brace  planned for surgery   pain control     HTN  for now will monitor and treat post op if neede    CKD  ? due to urinary retention / hydronephrosis   s/p Lorenzo   fu with   crt improving    Pre-Operative Cardiac Risk Stratification and Optimization   Based on current ACC/AHA guidelines, patient history and physical exam, the patient is considered to have low cardiac risk  pt denies any chest pain, sob, palpitation, dizziness or syncope.  exercise capacity is greater than 4 METs.  can proceed to OR for this urgent surgery      Advanced care planning was discussed with patient and family.  Risks, benefits and alternatives of the cardiac treatments and medical therapy including procedures were discussed in detail and all questions were answered. Importance of compliance with medical therapy and lifestyle modification to improve cardiovascular health were addressed. Appropriate forms and patient educational materials were reviewed. 30 minutes face to face spent.

## 2021-02-11 NOTE — PROGRESS NOTE ADULT - ASSESSMENT
74 yo male with urinary retention of 1800cc, bl hydronephrosis - right mld and left severe, enlarged prostate with median lobe, ALEKSANDR upon presentation after fall.     - Continue flomax  - ALEKSANDR resolving, continue to trend  - Keep dutton until Cr nadirs and patient is at baseline ambulatory status  - Avoid narcotics/constipation, bowel regimen to ensure soft stools (senna/colace/miralax prn)  - Trial of Void per primary team, ensure post-void residual obtained  - Consider renal u/s f/u imaging after ALEKSANDR/POD resolves

## 2021-02-11 NOTE — PROGRESS NOTE ADULT - ASSESSMENT
HPI:  Patient is a 75y old  Male who presents with a chief complaint of fall     HPI: 75 M with no PMH presents s/p mechanical fall on Thursday when he slipped down stairs, landed on his back. He hit his head and had some bleeding however no loss of consciousness. Since then he has had difficulty ambulating. He has been having pain in the neck, chest wall, and L hip. Patient is not on any blood thinners. No fevers, chills, chest pain, shortness of breath. No n/v/diarrhea.    Of note, patient found to have L hydroureteronephrosis as well as distended bladder on imaging. Patient denies any difficulty urinating however has not urinated since ED admission. He denies any dysuria or hematuria however has been having increased urinary frequency.     (09 Feb 2021 03:45)    74 yo male with type 2-3 dens fracture and left pelvis fracture       PLAN:  Neuro:   1 OR canceled for today, will be on Monday   2 oob with c collar   3 continue prn tylenol for pain   4 collar on at all times   Respiratory:   1 room air     CV:  1 bp stable   2- cardiology cleared     Endocrine:   1 stable     Heme/Onc:  stable                DVT ppx: scds   Renal:   1 Urology consult appreciated-monitor for increased urine output    2 continue dutton for retention   3 started on flomax   4 anita- likely from retention -trend , continue on ivf   ID:   1 afebrile     GI:   1 regular diet   2 stool softener       appreciate ortho consult -WBAT on left leg       discussed with Dr. Dove   spectra [phone]     npo after midnight   medicne clearance :d  cardiology clearance d

## 2021-02-11 NOTE — PROGRESS NOTE ADULT - SUBJECTIVE AND OBJECTIVE BOX
SUBJECTIVE: Patient seen and examined.  No acute distress.    Vital Signs Last 24 Hrs  T(C): 36.4 (11 Feb 2021 08:40), Max: 36.8 (11 Feb 2021 05:29)  T(F): 97.5 (11 Feb 2021 08:40), Max: 98.3 (11 Feb 2021 05:29)  HR: 94 (11 Feb 2021 08:40) (82 - 100)  BP: 168/87 (11 Feb 2021 08:40) (123/76 - 171/96)  BP(mean): --  RR: 18 (11 Feb 2021 08:40) (18 - 18)  SpO2: 97% (11 Feb 2021 08:40) (95% - 98%)    PHYSICAL EXAM:    Constitutional: No Acute Distress     Neurological: AOx3, Following Commands, Moving all Extremities, UE 5/5 rle 5/5 lle 4/5     Pulmonary: Clear to Auscultation, No rales, No rhonchi, No wheezes     Cardiovascular: S1, S2, Regular rate and rhythm     Gastrointestinal: Soft, Non-tender, Non-distended     Extremities: No calf tenderness     LABS:                        10.4   8.23  )-----------( 240      ( 10 Feb 2021 07:19 )             32.7    02-11    143  |  110<H>  |  19  ----------------------------<  112<H>  4.0   |  24  |  1.28    Ca    8.2<L>      11 Feb 2021 04:44  Phos  2.2     02-11  Mg     2.0     02-11    PT/INR - ( 10 Feb 2021 09:03 )   PT: 11.9 sec;   INR: 1.00 ratio         PTT - ( 10 Feb 2021 09:03 )  PTT:28.6 sec    02-10 @ 07:01  -  02-11 @ 07:00  --------------------------------------------------------  IN: 1637 mL / OUT: 1250 mL / NET: 387 mL      MEDICATIONS:  Anticoagulation:     Antibiotics:    Endo:    Neuro:  acetaminophen   Tablet .. 650 milliGRAM(s) Oral every 6 hours PRN Temp greater or equal to 38C (100.4F), Mild Pain (1 - 3)  HYDROmorphone   Tablet 2 milliGRAM(s) Oral every 6 hours PRN Severe Pain (7 - 10)  melatonin 3 milliGRAM(s) Oral at bedtime PRN Insomnia  ondansetron Injectable 4 milliGRAM(s) IV Push every 6 hours PRN Nausea and/or Vomiting    Cardiac:  tamsulosin 0.4 milliGRAM(s) Oral daily    Pulm:    GI/:  senna 2 Tablet(s) Oral at bedtime PRN Constipation    Other:   sodium chloride 0.9%. 1000 milliLiter(s) IV Continuous <Continuous>    DIET: NPO    IMAGING:

## 2021-02-11 NOTE — PROGRESS NOTE ADULT - PROBLEM SELECTOR PLAN 6
if not going for surgery today, would d/c IVF. pain control. If still elevated, can start Amlodipine

## 2021-02-11 NOTE — PROGRESS NOTE ADULT - SUBJECTIVE AND OBJECTIVE BOX
Colton Kongpinkykeeleysherron   Pager [phone]  Office [phone]      CC: Patient is a 76y old  Male who presents with a chief complaint of Patient was admitted after a fall with type 2 dens fracture and left pelvic fracture (10 Feb 2021 11:26)      SUBJECTIVE / OVERNIGHT EVENTS:    MEDICATIONS  (STANDING):  sodium chloride 0.9%. 1000 milliLiter(s) (75 mL/Hr) IV Continuous <Continuous>  tamsulosin 0.4 milliGRAM(s) Oral daily    MEDICATIONS  (PRN):  acetaminophen   Tablet .. 650 milliGRAM(s) Oral every 6 hours PRN Temp greater or equal to 38C (100.4F), Mild Pain (1 - 3)  HYDROmorphone   Tablet 2 milliGRAM(s) Oral every 6 hours PRN Severe Pain (7 - 10)  melatonin 3 milliGRAM(s) Oral at bedtime PRN Insomnia  ondansetron Injectable 4 milliGRAM(s) IV Push every 6 hours PRN Nausea and/or Vomiting  senna 2 Tablet(s) Oral at bedtime PRN Constipation      Vital Signs Last 24 Hrs  T(C): 37.1 (11 Feb 2021 13:21), Max: 37.1 (11 Feb 2021 13:21)  T(F): 98.7 (11 Feb 2021 13:21), Max: 98.7 (11 Feb 2021 13:21)  HR: 81 (11 Feb 2021 13:21) (81 - 100)  BP: 153/83 (11 Feb 2021 13:21) (123/76 - 171/96)  BP(mean): --  RR: 18 (11 Feb 2021 13:21) (18 - 18)  SpO2: 93% (11 Feb 2021 13:21) (93% - 98%)  CAPILLARY BLOOD GLUCOSE        I&O's Summary    10 Feb 2021 07:01  -  11 Feb 2021 07:00  --------------------------------------------------------  IN: 1637 mL / OUT: 1250 mL / NET: 387 mL    11 Feb 2021 07:01  -  11 Feb 2021 15:44  --------------------------------------------------------  IN: 0 mL / OUT: 350 mL / NET: -350 mL      tele:    PHYSICAL EXAM:    GENERAL: NAD   HEENT: EOMI, PERRL  PULM: Clear to auscultation bilaterally  CV: Regular rate and rhythm; nl S1, S2; No murmurs, rubs, or gallops  ABDOMEN: Soft, Nontender, Nondistended; Bowel sounds present  EXTREMITIES/MSK:  No edema, calf tenderness   PSYCH: AAOx3  NEUROLOGY: non-focal          LABS:                        10.4   8.23  )-----------( 240      ( 10 Feb 2021 07:19 )             32.7     02-11    143  |  110<H>  |  19  ----------------------------<  112<H>  4.0   |  24  |  1.28    Ca    8.2<L>      11 Feb 2021 04:44  Phos  2.2     02-11  Mg     2.0     02-11      PT/INR - ( 10 Feb 2021 09:03 )   PT: 11.9 sec;   INR: 1.00 ratio         PTT - ( 10 Feb 2021 09:03 )  PTT:28.6 sec  CARDIAC MARKERS ( 10 Feb 2021 07:19 )  x     / x     / 182 U/L / x     / 3.6 ng/mL            Culture - Urine (collected 09 Feb 2021 05:21)  Source: .Urine Clean Catch (Midstream)  Final Report (10 Feb 2021 00:37):    No growth      RADIOLOGY & ADDITIONAL TESTS:    Imaging Personally Reviewed:    Consultant(s) Notes Reviewed:      Care Discussed with Consultants/Other Providers:   Colton Coffey   Pager [phone]  Office [phone]      CC: Patient is a 76y old  Male who presents with a chief complaint of Patient was admitted after a fall with type 2 dens fracture and left pelvic fracture (10 Feb 2021 11:26)      SUBJECTIVE / OVERNIGHT EVENTS: has unchanged neck pain but no neurologic deficits. no other complaints on ROS    MEDICATIONS  (STANDING):  sodium chloride 0.9%. 1000 milliLiter(s) (75 mL/Hr) IV Continuous <Continuous>  tamsulosin 0.4 milliGRAM(s) Oral daily    MEDICATIONS  (PRN):  acetaminophen   Tablet .. 650 milliGRAM(s) Oral every 6 hours PRN Temp greater or equal to 38C (100.4F), Mild Pain (1 - 3)  HYDROmorphone   Tablet 2 milliGRAM(s) Oral every 6 hours PRN Severe Pain (7 - 10)  melatonin 3 milliGRAM(s) Oral at bedtime PRN Insomnia  ondansetron Injectable 4 milliGRAM(s) IV Push every 6 hours PRN Nausea and/or Vomiting  senna 2 Tablet(s) Oral at bedtime PRN Constipation      Vital Signs Last 24 Hrs  T(C): 37.1 (11 Feb 2021 13:21), Max: 37.1 (11 Feb 2021 13:21)  T(F): 98.7 (11 Feb 2021 13:21), Max: 98.7 (11 Feb 2021 13:21)  HR: 81 (11 Feb 2021 13:21) (81 - 100)  BP: 153/83 (11 Feb 2021 13:21) (123/76 - 171/96)  BP(mean): --  RR: 18 (11 Feb 2021 13:21) (18 - 18)  SpO2: 93% (11 Feb 2021 13:21) (93% - 98%)  CAPILLARY BLOOD GLUCOSE        I&O's Summary    10 Feb 2021 07:01  -  11 Feb 2021 07:00  --------------------------------------------------------  IN: 1637 mL / OUT: 1250 mL / NET: 387 mL    11 Feb 2021 07:01  -  11 Feb 2021 15:44  --------------------------------------------------------  IN: 0 mL / OUT: 350 mL / NET: -350 mL          PHYSICAL EXAM:    GENERAL: NAD   HEENT: EOMI, PERRL  PULM: Clear to auscultation bilaterally  CV: Regular rate and rhythm; nl S1, S2; 2/6 ALBERT  ABDOMEN: Soft, Nontender, Nondistended; Bowel sounds present  EXTREMITIES/MSK:  No edema, calf tenderness   PSYCH: AAOx3  NEUROLOGY: non-focal          LABS:                        10.4   8.23  )-----------( 240      ( 10 Feb 2021 07:19 )             32.7     02-11    143  |  110<H>  |  19  ----------------------------<  112<H>  4.0   |  24  |  1.28    Ca    8.2<L>      11 Feb 2021 04:44  Phos  2.2     02-11  Mg     2.0     02-11      PT/INR - ( 10 Feb 2021 09:03 )   PT: 11.9 sec;   INR: 1.00 ratio         PTT - ( 10 Feb 2021 09:03 )  PTT:28.6 sec  CARDIAC MARKERS ( 10 Feb 2021 07:19 )  x     / x     / 182 U/L / x     / 3.6 ng/mL            Culture - Urine (collected 09 Feb 2021 05:21)  Source: .Urine Clean Catch (Midstream)  Final Report (10 Feb 2021 00:37):    No growth      RADIOLOGY & ADDITIONAL TESTS:    Imaging Personally Reviewed:    Consultant(s) Notes Reviewed:      Care Discussed with Consultants/Other Providers: neurosurg regard ALEKSANDR

## 2021-02-11 NOTE — PROGRESS NOTE ADULT - SUBJECTIVE AND OBJECTIVE BOX
DATE OF SERVICE: 02-11-21 @ 07:47    Subjective: Patient seen and examined. No new events except as noted.     SUBJECTIVE/ROS:  feels ok       MEDICATIONS:  MEDICATIONS  (STANDING):  sodium chloride 0.9%. 1000 milliLiter(s) (75 mL/Hr) IV Continuous <Continuous>  tamsulosin 0.4 milliGRAM(s) Oral daily      PHYSICAL EXAM:  T(C): 36.8 (02-11-21 @ 05:29), Max: 36.8 (02-11-21 @ 05:29)  HR: 82 (02-11-21 @ 05:29) (82 - 100)  BP: 150/80 (02-11-21 @ 05:29) (123/76 - 171/96)  RR: 18 (02-11-21 @ 05:29) (18 - 18)  SpO2: 98% (02-11-21 @ 05:29) (95% - 98%)  Wt(kg): --  I&O's Summary    10 Feb 2021 07:01  -  11 Feb 2021 07:00  --------------------------------------------------------  IN: 737 mL / OUT: 1250 mL / NET: -513 mL            JVP: Normal  Neck: supple  Lung: clear   CV: S1 S2 , Murmur:  Abd: soft  Ext: No edema  neuro: Awake / alert  Psych: flat affect  Skin: normal``    LABS/DATA:    CARDIAC MARKERS:  CARDIAC MARKERS ( 10 Feb 2021 07:19 )  x     / x     / 182 U/L / x     / 3.6 ng/mL  CARDIAC MARKERS ( 08 Feb 2021 20:18 )  x     / x     / 204 U/L / x     / x                                    10.4   8.23  )-----------( 240      ( 10 Feb 2021 07:19 )             32.7     02-11    143  |  110<H>  |  19  ----------------------------<  112<H>  4.0   |  24  |  1.28    Ca    8.2<L>      11 Feb 2021 04:44  Phos  2.2     02-11  Mg     2.0     02-11      proBNP:   Lipid Profile:   HgA1c:   TSH:     TELE:  EKG:

## 2021-02-12 LAB
ANION GAP SERPL CALC-SCNC: 7 MMOL/L — SIGNIFICANT CHANGE UP (ref 5–17)
BUN SERPL-MCNC: 18 MG/DL — SIGNIFICANT CHANGE UP (ref 7–23)
CALCIUM SERPL-MCNC: 8.2 MG/DL — LOW (ref 8.4–10.5)
CHLORIDE SERPL-SCNC: 109 MMOL/L — HIGH (ref 96–108)
CO2 SERPL-SCNC: 25 MMOL/L — SIGNIFICANT CHANGE UP (ref 22–31)
CREAT SERPL-MCNC: 1.29 MG/DL — SIGNIFICANT CHANGE UP (ref 0.5–1.3)
GLUCOSE SERPL-MCNC: 101 MG/DL — HIGH (ref 70–99)
HCT VFR BLD CALC: 31 % — LOW (ref 39–50)
HGB BLD-MCNC: 9.6 G/DL — LOW (ref 13–17)
MCHC RBC-ENTMCNC: 28.6 PG — SIGNIFICANT CHANGE UP (ref 27–34)
MCHC RBC-ENTMCNC: 31 GM/DL — LOW (ref 32–36)
MCV RBC AUTO: 92.3 FL — SIGNIFICANT CHANGE UP (ref 80–100)
NRBC # BLD: 0 /100 WBCS — SIGNIFICANT CHANGE UP (ref 0–0)
PHOSPHATE SERPL-MCNC: 2.8 MG/DL — SIGNIFICANT CHANGE UP (ref 2.5–4.5)
PLATELET # BLD AUTO: 217 K/UL — SIGNIFICANT CHANGE UP (ref 150–400)
POTASSIUM SERPL-MCNC: 4 MMOL/L — SIGNIFICANT CHANGE UP (ref 3.5–5.3)
POTASSIUM SERPL-SCNC: 4 MMOL/L — SIGNIFICANT CHANGE UP (ref 3.5–5.3)
RBC # BLD: 3.36 M/UL — LOW (ref 4.2–5.8)
RBC # FLD: 13.1 % — SIGNIFICANT CHANGE UP (ref 10.3–14.5)
SODIUM SERPL-SCNC: 141 MMOL/L — SIGNIFICANT CHANGE UP (ref 135–145)
WBC # BLD: 7.45 K/UL — SIGNIFICANT CHANGE UP (ref 3.8–10.5)
WBC # FLD AUTO: 7.45 K/UL — SIGNIFICANT CHANGE UP (ref 3.8–10.5)

## 2021-02-12 PROCEDURE — 99232 SBSQ HOSP IP/OBS MODERATE 35: CPT | Mod: 57

## 2021-02-12 RX ORDER — ACETAMINOPHEN 500 MG
1000 TABLET ORAL ONCE
Refills: 0 | Status: COMPLETED | OUTPATIENT
Start: 2021-02-12 | End: 2021-02-12

## 2021-02-12 RX ORDER — ACETAMINOPHEN 500 MG
1000 TABLET ORAL ONCE
Refills: 0 | Status: COMPLETED | OUTPATIENT
Start: 2021-02-12 | End: 2021-02-13

## 2021-02-12 RX ADMIN — TAMSULOSIN HYDROCHLORIDE 0.4 MILLIGRAM(S): 0.4 CAPSULE ORAL at 11:38

## 2021-02-12 RX ADMIN — HEPARIN SODIUM 5000 UNIT(S): 5000 INJECTION INTRAVENOUS; SUBCUTANEOUS at 17:52

## 2021-02-12 RX ADMIN — HYDROMORPHONE HYDROCHLORIDE 2 MILLIGRAM(S): 2 INJECTION INTRAMUSCULAR; INTRAVENOUS; SUBCUTANEOUS at 08:29

## 2021-02-12 RX ADMIN — HEPARIN SODIUM 5000 UNIT(S): 5000 INJECTION INTRAVENOUS; SUBCUTANEOUS at 06:21

## 2021-02-12 RX ADMIN — Medication 1 PACKET(S): at 08:29

## 2021-02-12 RX ADMIN — Medication 400 MILLIGRAM(S): at 06:21

## 2021-02-12 RX ADMIN — HYDROMORPHONE HYDROCHLORIDE 2 MILLIGRAM(S): 2 INJECTION INTRAMUSCULAR; INTRAVENOUS; SUBCUTANEOUS at 17:52

## 2021-02-12 NOTE — DIETITIAN INITIAL EVALUATION ADULT. - OTHER INFO
Pt reports a few years ago he had some pancreas issues, cannot recall exactly what but states he had lost some wt at that time, usually had been around 150-160 pounds. Reports he weighs less than that now and has not bought a new wardrobe but is also not sure of his exact wt since he does not weigh himself. Reports his wt now has been stable for a few years and has not experienced any recent wt changes. Noted dosing wt of about 140 pounds. Noted per Jatinder SHER wt of 170 pounds in 2017.     Pt denies any oral nutritional supplements at this time. Reports nutrition and gaining wt is not his concern at this time, states he is more concerned about his pain and why his surgery was pushed, emotional support provided, RN also aware of pt's concerns.     Pt declined nutrition focused physical exam at this time.     Based on current dosing wt, pt consider underweight. Currently pt c overall good appetite/intake and stable wt, would continue to follow up as needed and conduct nutrition focused physical exam to further assess pt as accepted by pt. Noted pt c stage I pressure injury, indicating starting of impaired skin integrity.

## 2021-02-12 NOTE — PROGRESS NOTE ADULT - ASSESSMENT
HPI:  Patient is a 75y old  Male who presents with a chief complaint of fall     HPI: 75 M with no PMH presents s/p mechanical fall on Thursday when he slipped down stairs, landed on his back. He hit his head and had some bleeding however no loss of consciousness. Since then he has had difficulty ambulating. He has been having pain in the neck, chest wall, and L hip. Patient is not on any blood thinners. No fevers, chills, chest pain, shortness of breath. No n/v/diarrhea.    Of note, patient found to have L hydroureteronephrosis as well as distended bladder on imaging. Patient denies any difficulty urinating however has not urinated since ED admission. He denies any dysuria or hematuria however has been having increased urinary frequency.     (09 Feb 2021 03:45)    74 yo male with type 2-3 dens fracture and left pelvis fracture   PROCEDURE:  p  POD#    PLAN:  Neuro:   1 OR planning -pre op for 2/15   2 oob with c collar   3 continue prn tylenol for pain   4 collar on at all times   Respiratory:   1 room air     CV:  1 bp stable     Endocrine:   1 stable     Heme/Onc:  stable                DVT ppx: scds   Renal:   1 Retention-continue dutton   2 us : result noted -improving hydronephrosis   3 started on flomax   4 anita- improving  ID:   1 afebrile     GI:   1 regular diet   2 stool softener       appreciate ortho consult -WBAT on left leg   doppler :neg  Social/Family:   Discharge planning: p    discussed with Dr. Derrell silverman [phone]     npo after midnight   medicne clearance :p   cardiology clearance :p   pre op labs are done   renal us and le dopplers :p     41  minutes were spent . More than 50 percent of time was spent on examining patient, reviewing ct scan and mri, calling urology  and discussed plan with hospitalist.       HPI:  Patient is a 75y old  Male who presents with a chief complaint of fall     HPI: 75 M with no PMH presents s/p mechanical fall on Thursday when he slipped down stairs, landed on his back. He hit his head and had some bleeding however no loss of consciousness. Since then he has had difficulty ambulating. He has been having pain in the neck, chest wall, and L hip. Patient is not on any blood thinners. No fevers, chills, chest pain, shortness of breath. No n/v/diarrhea.    Of note, patient found to have L hydroureteronephrosis as well as distended bladder on imaging. Patient denies any difficulty urinating however has not urinated since ED admission. He denies any dysuria or hematuria however has been having increased urinary frequency.     (09 Feb 2021 03:45)    74 yo male with type 2-3 dens fracture and left pelvis fracture   PROCEDURE:  p  POD#    PLAN:  Neuro:   1 OR planning -pre op for 2/15   2 oob with c collar   3 continue prn tylenol for pain   4 collar on at all times   Respiratory:   1 room air     CV:  1 bp stable     Endocrine:   1 stable     Heme/Onc:  stable                DVT ppx: scds   Renal:   1 Retention-continue dutton   2 us : result noted -improving hydronephrosis   3 started on flomax   4 anita- improving  ID:   1 afebrile     GI:   1 regular diet   2 stool softener       appreciate ortho consult -WBAT on left leg   doppler :neg  Social/Family:   Discharge planning: p    discussed with Dr. Derrell silverman [id number]     npo after midnight   medicne clearance :p   cardiology clearance :p   pre op labs are done   renal us and le dopplers :p     35 minutes were spent . More than 50 percent of time was spent on examining patient, and going over plan with medicine and urology team .

## 2021-02-12 NOTE — DIETITIAN INITIAL EVALUATION ADULT. - ORAL INTAKE PTA/DIET HISTORY
Pt reports he eats about 1-2 meals daily at home, mostly foods he purchases from the supermarket; did not want to elaborate much on what he eats. Reports NKFA. States no micronutrient coverage at home.

## 2021-02-12 NOTE — DIETITIAN INITIAL EVALUATION ADULT. - PERTINENT LABORATORY DATA
02-12 @ 06:14: Na 141, BUN 18, Cr 1.29, <H>, K+ 4.0, Phos 2.8, Mg --, Alk Phos --, ALT/SGPT --, AST/SGOT --, HbA1c --    Noted slight elevation in blood glucose, no h/o DM or pre-DM.

## 2021-02-12 NOTE — PROGRESS NOTE ADULT - SUBJECTIVE AND OBJECTIVE BOX
DATE OF SERVICE: 02-12-21 @ 07:26    Subjective: Patient seen and examined. No new events except as noted.     SUBJECTIVE/ROS:  No chest pain, dyspnea, palpitation, or dizziness.       MEDICATIONS:  MEDICATIONS  (STANDING):  heparin   Injectable 5000 Unit(s) SubCutaneous every 12 hours  potassium phosphate / sodium phosphate Powder (PHOS-NaK) 1 Packet(s) Oral three times a day with meals  tamsulosin 0.4 milliGRAM(s) Oral daily      PHYSICAL EXAM:  T(C): 36.9 (02-12-21 @ 05:57), Max: 37.1 (02-11-21 @ 13:21)  HR: 87 (02-12-21 @ 05:57) (81 - 97)  BP: 157/89 (02-12-21 @ 05:57) (122/66 - 168/87)  RR: 18 (02-12-21 @ 05:57) (18 - 18)  SpO2: 98% (02-12-21 @ 05:57) (93% - 98%)  Wt(kg): --  I&O's Summary    11 Feb 2021 07:01  -  12 Feb 2021 07:00  --------------------------------------------------------  IN: 120 mL / OUT: 1250 mL / NET: -1130 mL            JVP: Normal  Neck: supple  Lung: clear   CV: S1 S2 , Murmur:  Abd: soft  Ext: No edema  neuro: Awake / alert  Psych: flat affect  Skin: normal``    LABS/DATA:    CARDIAC MARKERS:  CARDIAC MARKERS ( 10 Feb 2021 07:19 )  x     / x     / 182 U/L / x     / 3.6 ng/mL                                9.6    7.45  )-----------( 217      ( 12 Feb 2021 06:14 )             31.0     02-12    141  |  109<H>  |  18  ----------------------------<  101<H>  4.0   |  25  |  1.29    Ca    8.2<L>      12 Feb 2021 06:14  Phos  2.8     02-12  Mg     2.0     02-11      proBNP:   Lipid Profile:   HgA1c:   TSH:     TELE:  EKG:

## 2021-02-12 NOTE — DIETITIAN INITIAL EVALUATION ADULT. - EDUCATION DIETARY MODIFICATIONS
reviewed importance of continued adequate intake at this time, taking protein c all meals and reviewed sources of protein available on menu and nutrient dense snacks/(1) partially meets; needs review/practice/verbalization

## 2021-02-12 NOTE — DIETITIAN INITIAL EVALUATION ADULT. - FACTORS AFF FOOD INTAKE
pt reports in house he has been eating well, ate most of his pancakes for breakfast today and almost all of soup and some of entree for lunch; RN confirms pt overall has been able to eat most of his meals; denies any chewing/swallowing issues; pt c C-collar in place, denies any issues with eating though; no BM noted in house as of yet, pt denies any discomfort

## 2021-02-12 NOTE — DIETITIAN INITIAL EVALUATION ADULT. - PERTINENT MEDS FT
MEDICATIONS  (STANDING):  heparin   Injectable 5000 Unit(s) SubCutaneous every 12 hours  tamsulosin 0.4 milliGRAM(s) Oral daily    MEDICATIONS  (PRN):  acetaminophen   Tablet .. 650 milliGRAM(s) Oral every 6 hours PRN Temp greater or equal to 38C (100.4F), Mild Pain (1 - 3)  HYDROmorphone   Tablet 2 milliGRAM(s) Oral every 6 hours PRN Severe Pain (7 - 10)  melatonin 3 milliGRAM(s) Oral at bedtime PRN Insomnia  ondansetron Injectable 4 milliGRAM(s) IV Push every 6 hours PRN Nausea and/or Vomiting  senna 2 Tablet(s) Oral at bedtime PRN Constipation

## 2021-02-12 NOTE — PROGRESS NOTE ADULT - SUBJECTIVE AND OBJECTIVE BOX
SUBJECTIVE:   Patient was seen and evaluated at bedside. Patient is resting in bed and is in no new acute distress.   OVERNIGHT EVENTS:   none   Vital Signs Last 24 Hrs  T(C): 36.9 (12 Feb 2021 05:57), Max: 37.1 (11 Feb 2021 13:21)  T(F): 98.5 (12 Feb 2021 05:57), Max: 98.7 (11 Feb 2021 13:21)  HR: 87 (12 Feb 2021 05:57) (81 - 97)  BP: 157/89 (12 Feb 2021 05:57) (122/66 - 164/88)  BP(mean): --  RR: 18 (12 Feb 2021 05:57) (18 - 18)  SpO2: 98% (12 Feb 2021 05:57) (93% - 98%)    PHYSICAL EXAM:    General: No Acute Distress     Neurological:  Awake, alert oriented to person, place and time, Following Commands, PERRL, EOMI, Face Symmetrical, Speech Fluent, Moving all extremities, Muscle Strength -uppers full strength and lowers lle df/pf 5/5, hf 2/5 pain limited kf ke 3/5 pain limited and rle wnl , sensation wnl     Pulmonary: Clear to Auscultation, No Rales, No Rhonchi, No Wheezes     Cardiovascular: S1, S2, Regular Rate and Rhythm     Gastrointestinal: Soft, Nontender, Nondistended     Incision:   none   LABS:                        9.6    7.45  )-----------( 217      ( 12 Feb 2021 06:14 )             31.0    02-12    141  |  109<H>  |  18  ----------------------------<  101<H>  4.0   |  25  |  1.29    Ca    8.2<L>      12 Feb 2021 06:14  Phos  2.8     02-12  Mg     2.0     02-11 02-11 @ 07:01  -  02-12 @ 07:00  --------------------------------------------------------  IN: 120 mL / OUT: 1250 mL / NET: -1130 mL      DRAINS:     MEDICATIONS:  Antibiotics:    Neuro:  acetaminophen   Tablet .. 650 milliGRAM(s) Oral every 6 hours PRN Temp greater or equal to 38C (100.4F), Mild Pain (1 - 3)  HYDROmorphone   Tablet 2 milliGRAM(s) Oral every 6 hours PRN Severe Pain (7 - 10)  melatonin 3 milliGRAM(s) Oral at bedtime PRN Insomnia  ondansetron Injectable 4 milliGRAM(s) IV Push every 6 hours PRN Nausea and/or Vomiting    Cardiac:  tamsulosin 0.4 milliGRAM(s) Oral daily    Pulm:    GI/:  senna 2 Tablet(s) Oral at bedtime PRN Constipation    Other:   heparin   Injectable 5000 Unit(s) SubCutaneous every 12 hours    DIET: [] Regular [] CCD [] Renal [] Puree [] Dysphagia [] Tube Feeds:   regular   IMAGING:   no new imaging today   2/10 Renal us : Decrease in the dilatation of the left hydronephrosis. Mild left-sided hydronephrosis remains. No right-sided hydronephrosis.    The urinary bladder is decompressed by indwelling Lorenzo catheter. The urinary bladder wall appears thickened. Detailed evaluation of the bladder cannot be performed and should be considered with Lorenzo catheter clamped or removed.    Prostate gland cannot be evaluated.

## 2021-02-12 NOTE — DIETITIAN INITIAL EVALUATION ADULT. - REASON FOR ADMISSION
Patient was admitted after a fall with type 2 dens fracture and left pelvic fracture; plan for OR in a few days; noted pt c hydronephrosis, being followed by Urology. Pt c ALEKSANDR.

## 2021-02-12 NOTE — DIETITIAN INITIAL EVALUATION ADULT. - ADD RECOMMEND
1. Recommend multivitamin, one tab once daily. 2. RD remains available for further nutritional interventions as needed.

## 2021-02-13 DIAGNOSIS — D64.9 ANEMIA, UNSPECIFIED: ICD-10-CM

## 2021-02-13 DIAGNOSIS — R25.1 TREMOR, UNSPECIFIED: ICD-10-CM

## 2021-02-13 LAB
ANION GAP SERPL CALC-SCNC: 10 MMOL/L — SIGNIFICANT CHANGE UP (ref 5–17)
BUN SERPL-MCNC: 31 MG/DL — HIGH (ref 7–23)
CALCIUM SERPL-MCNC: 8.6 MG/DL — SIGNIFICANT CHANGE UP (ref 8.4–10.5)
CHLORIDE SERPL-SCNC: 111 MMOL/L — HIGH (ref 96–108)
CO2 SERPL-SCNC: 23 MMOL/L — SIGNIFICANT CHANGE UP (ref 22–31)
CREAT ?TM UR-MCNC: 88 MG/DL — SIGNIFICANT CHANGE UP
CREAT SERPL-MCNC: 1.42 MG/DL — HIGH (ref 0.5–1.3)
GLUCOSE SERPL-MCNC: 95 MG/DL — SIGNIFICANT CHANGE UP (ref 70–99)
POTASSIUM SERPL-MCNC: 4.3 MMOL/L — SIGNIFICANT CHANGE UP (ref 3.5–5.3)
POTASSIUM SERPL-SCNC: 4.3 MMOL/L — SIGNIFICANT CHANGE UP (ref 3.5–5.3)
SODIUM SERPL-SCNC: 144 MMOL/L — SIGNIFICANT CHANGE UP (ref 135–145)
SODIUM UR-SCNC: 118 MMOL/L — SIGNIFICANT CHANGE UP

## 2021-02-13 PROCEDURE — 99232 SBSQ HOSP IP/OBS MODERATE 35: CPT | Mod: 57

## 2021-02-13 PROCEDURE — 99233 SBSQ HOSP IP/OBS HIGH 50: CPT

## 2021-02-13 RX ORDER — SODIUM CHLORIDE 9 MG/ML
1000 INJECTION INTRAMUSCULAR; INTRAVENOUS; SUBCUTANEOUS
Refills: 0 | Status: DISCONTINUED | OUTPATIENT
Start: 2021-02-13 | End: 2021-02-14

## 2021-02-13 RX ORDER — ACETAMINOPHEN 500 MG
1000 TABLET ORAL ONCE
Refills: 0 | Status: COMPLETED | OUTPATIENT
Start: 2021-02-13 | End: 2021-02-13

## 2021-02-13 RX ADMIN — HYDROMORPHONE HYDROCHLORIDE 2 MILLIGRAM(S): 2 INJECTION INTRAMUSCULAR; INTRAVENOUS; SUBCUTANEOUS at 08:15

## 2021-02-13 RX ADMIN — Medication 650 MILLIGRAM(S): at 21:23

## 2021-02-13 RX ADMIN — Medication 400 MILLIGRAM(S): at 13:48

## 2021-02-13 RX ADMIN — HYDROMORPHONE HYDROCHLORIDE 2 MILLIGRAM(S): 2 INJECTION INTRAMUSCULAR; INTRAVENOUS; SUBCUTANEOUS at 18:18

## 2021-02-13 RX ADMIN — HEPARIN SODIUM 5000 UNIT(S): 5000 INJECTION INTRAVENOUS; SUBCUTANEOUS at 06:16

## 2021-02-13 RX ADMIN — SODIUM CHLORIDE 75 MILLILITER(S): 9 INJECTION INTRAMUSCULAR; INTRAVENOUS; SUBCUTANEOUS at 16:42

## 2021-02-13 RX ADMIN — TAMSULOSIN HYDROCHLORIDE 0.4 MILLIGRAM(S): 0.4 CAPSULE ORAL at 11:32

## 2021-02-13 RX ADMIN — Medication 400 MILLIGRAM(S): at 06:16

## 2021-02-13 RX ADMIN — Medication 1 TABLET(S): at 11:32

## 2021-02-13 RX ADMIN — HEPARIN SODIUM 5000 UNIT(S): 5000 INJECTION INTRAVENOUS; SUBCUTANEOUS at 17:29

## 2021-02-13 NOTE — PROVIDER CONTACT NOTE (OTHER) - BACKGROUND
Pt was admitted S/P fall w/ cervical spine fracture and pelvic fracture.
Pt was admitted S/P fall.
pt admitted s/p fall with type II dens fx.
Pt admitted s/p fall with type II dense fx of the thoracic and lumbar spine

## 2021-02-13 NOTE — PROGRESS NOTE ADULT - ASSESSMENT
75 M with no PMH presents s/p mechanical fall on Thursday when he slipped down stairs, landed on his back. He hit his head and had some bleeding however no loss of consciousness. Since then he has had difficulty ambulating. He has been having pain in the neck, chest wall, and L hip. Patient is not on any blood thinners. No fevers, chills, chest pain, shortness of breath. No n/v/diarrhea.  Of note, patient found to have L hydroureteronephrosis as well as distended bladder on imaging. Patient denies any difficulty urinating however has not urinated since ED admission. He denies any dysuria or hematuria however has been having increased urinary frequency.     (09 Feb 2021 03:45)    PLAN:  -Neuro: Collar at all times  -Plan for OR on Monday. Pre op labs ordered for tomorrow morning  -Dilaudid for pain control  -Dr. Garrison following. Cleared for OR.   -Hospitalist following. 1) ALEKSANDR (acute kidney injury).  Plan: improved with dutton. would try to avoid hypotensive episodes given ALEKSANDR. pt denies h/o CKD but reports seeing a urologist for an enlarged prostate. avoid NSAIDs. pt also received contrast on admission. monitor creat and UO closely. 2) Urinary retention.  Plan: cont Dutton and Flomax. apprec Urology input.  3) Hydronephrosis of left kidney.  Plan: f/u renal US shows improved left hydro. will need outpt f/u.   -Creatinine slightly elevated today. Trend in am as per hospitalist   -Encouraged incentive spirometry  -Ortho saw for left inferior and high superior pubic rami/ant column non displaced fractures. Recommend 1) No acute orthopedic surgical intervention indicated at this time.  2) WBAT LLE, assistive devices as needed  3) Pain control.  -Continue dutton for retention. Continue flomax  -Regular diet  -Bowel regimen  -DVT ppx; venodyes and sqh  -PT: after OR     Will discuss above with Dr. Derrell Lott #[id number]

## 2021-02-13 NOTE — PROGRESS NOTE ADULT - ASSESSMENT
75 M with h/o constipation presents with mechanical fall found to have an impacted angulated C2 fracture extending into lateral masses involving vascular foramen bilaterally. Incidentally found to have a distended bladder and marked L hydroureteronephrosis.      75 M with h/o constipation presents with mechanical fall found to have an impacted angulated C2 fracture extending into lateral masses involving vascular foramen bilaterally. Incidentally found to have a distended bladder and marked L hydroureteronephrosis S/P dutton placement .  Patient was found to have  type 2 dens fracture and left pelvic fracture resulted from the fall  with plan for OR On 2/15.  Medicine was called back on 2/13 due to increased in Creatinine.

## 2021-02-13 NOTE — PROVIDER CONTACT NOTE (OTHER) - ASSESSMENT
Pt is A&Ox4 but forgetful at times. Pt uncooperative and agitated with staff. RN educated patient on the importance of routine vital signs, neurological and skin assessments but patient refused to have medical care provided.
pt sitting comfortably in bed no complaints of CP, palpitations, HA or blurred vision. /87 HR 94 temp 36.4 RR 18 O2 97%
Pt is complaining of a HA no complaints of blurred vision or chest pain. Pt /88 other VSS
Pt is A&Ox4 but forgetful at times. Pt is noncompliant and agitated with staff at times. RN educated pt on the importance of allowing RN to perform assessment on patient to track any changes in pt's health and neurological status. Pt acknowledged education and refused to have RN assess pt. RN left patients room and came back and pt allowed RN to perform assessment. LLE went from no drift to some effect against gravity. Pt denies numbness or tingling. Pulses are present in all extremities.

## 2021-02-13 NOTE — PROGRESS NOTE ADULT - PROBLEM SELECTOR PLAN 1
AS per previous provider : medically optimized for planned procedure if optimized from cards perspective. would try to avoid hypotensive episodes given ALEKSANDR.

## 2021-02-13 NOTE — PROGRESS NOTE ADULT - PROBLEM SELECTOR PLAN 8
Fine resting tremor Lt >RT upper extremity  which is not new as per patient   MIght need eval by Neuro which could be as outpt considering that patient is admitted with fall   once able , would recommend PT eval

## 2021-02-13 NOTE — PROVIDER CONTACT NOTE (OTHER) - REASON
Pt /87
Pt refusing care
Pt noncompliant with staff and there was a change pt's neurological assessment
Pt /88

## 2021-02-13 NOTE — PROGRESS NOTE ADULT - ATTENDING COMMENTS
reconsult as needed
I would suggest starting patient on isotonic IVF  with NS or LR and repeat BMP in AM   It would be nice if we can send Urine Na , Cr to check on Fena   Please monitor urine output for response , Avoid nephrotoxic agents   Please closely monitor H/H and if possible send anemia profile in AM labs ( Fe , Ferriting, TIBC , VIt B12 and folate level)       Yasmin Prado   Hospitalist   [phone]

## 2021-02-13 NOTE — PROGRESS NOTE ADULT - SUBJECTIVE AND OBJECTIVE BOX
Patient is a 76y old  Male who presents with a chief complaint of Patient was admitted after a fall with type 2 dens fracture and left pelvic fracture; plan for OR in a few days; noted pt c hydronephrosis, being followed by Urology. Pt c ALEKSANDR. (12 Feb 2021 14:42)      SUBJECTIVE / OVERNIGHT EVENTS:    75 M with no PMH admitted to hosp on 2/8/21 s/p mechanical fall on Thursday when he slipped down stairs, landed on his back. He hit his head and had some bleeding however no loss of consciousness. Since then he has had difficulty ambulating. He has been having pain in the neck, chest wall, and L hip. Patient is not on any blood thinners.  Incidentally found to have a distended bladder and marked L hydroureteronephrosis.    Patient was found to have  type 2 dens fracture and left pelvic fracture with plan for OR On 2/15.  Medicine was called back on 2/13 due to increased in Creatinine.  Patient is seen with neck collar with no current complaints.  POs dutton in place.        ADDITIONAL REVIEW OF SYSTEMS: Negative except for above    MEDICATIONS  (STANDING):  heparin   Injectable 5000 Unit(s) SubCutaneous every 12 hours  multivitamin 1 Tablet(s) Oral daily  tamsulosin 0.4 milliGRAM(s) Oral daily    MEDICATIONS  (PRN):  acetaminophen   Tablet .. 650 milliGRAM(s) Oral every 6 hours PRN Temp greater or equal to 38C (100.4F), Mild Pain (1 - 3)  HYDROmorphone   Tablet 2 milliGRAM(s) Oral every 6 hours PRN Severe Pain (7 - 10)  melatonin 3 milliGRAM(s) Oral at bedtime PRN Insomnia  ondansetron Injectable 4 milliGRAM(s) IV Push every 6 hours PRN Nausea and/or Vomiting  senna 2 Tablet(s) Oral at bedtime PRN Constipation      CAPILLARY BLOOD GLUCOSE        I&O's Summary    12 Feb 2021 07:01  -  13 Feb 2021 07:00  --------------------------------------------------------  IN: 540 mL / OUT: 950 mL / NET: -410 mL    13 Feb 2021 07:01  -  13 Feb 2021 16:04  --------------------------------------------------------  IN: 480 mL / OUT: 200 mL / NET: 280 mL        PHYSICAL EXAM:  Vital Signs Last 24 Hrs  T(C): 36.9 (13 Feb 2021 13:00), Max: 37.1 (13 Feb 2021 00:51)  T(F): 98.5 (13 Feb 2021 13:00), Max: 98.8 (13 Feb 2021 00:51)  HR: 90 (13 Feb 2021 13:00) (80 - 93)  BP: 130/86 (13 Feb 2021 13:00) (124/72 - 136/80)  BP(mean): --  RR: 16 (13 Feb 2021 13:00) (16 - 18)  SpO2: 97% (13 Feb 2021 13:00) (96% - 98%)    PHYSICAL EXAM:  HEENT: conjunctiva is clear and Neck with collar in place   PULM: Clear to auscultation bilaterally  CV: Regular rate and rhythm; nl S1, S2; 2/6 ALBERT  ABDOMEN: Soft, Nontender, Nondistended; Bowel sounds present  EXTREMITIES/MSK:  No edema, calf tenderness   PSYCH: calm   NEUROLOGY: non-focal, AAOx3        LABS:                        9.6    7.45  )-----------( 217      ( 12 Feb 2021 06:14 )             31.0     02-13    144  |  111<H>  |  31<H>  ----------------------------<  95  4.3   |  23  |  1.42<H>    Ca    8.6      13 Feb 2021 07:05  Phos  2.8     02-12                  RADIOLOGY & ADDITIONAL TESTS:    Imaging Personally Reviewed:    Electrocardiogram Personally Reviewed:    COORDINATION OF CARE:  Care Discussed with Consultants/Other Providers [Y/N]:  Prior or Outpatient Records Reviewed [Y/N]:     Patient is a 76y old  Male who presents with a chief complaint of Patient was admitted after a fall with type 2 dens fracture and left pelvic fracture; plan for OR in a few days; noted pt c hydronephrosis, being followed by Urology. Pt c ALEKSANDR. (12 Feb 2021 14:42)      SUBJECTIVE / OVERNIGHT EVENTS:    75 M with no PMH admitted to hosp on 2/8/21 s/p mechanical fall on Thursday when he slipped down stairs, landed on his back. He hit his head and had some bleeding however no loss of consciousness. Since then he has had difficulty ambulating. He has been having pain in the neck, chest wall, and L hip. Patient is not on any blood thinners.  Incidentally found to have a distended bladder and marked L hydroureteronephrosis.    Patient was found to have  type 2 dens fracture and left pelvic fracture with plan for OR On 2/15.  Medicine was called back on 2/13 due to increased in Creatinine.  Patient is seen with neck collar with no current complaints.  POs dutton in place.   Patient offers no complaints .  NO abd pain.       ADDITIONAL REVIEW OF SYSTEMS: Negative except for above    MEDICATIONS  (STANDING):  heparin   Injectable 5000 Unit(s) SubCutaneous every 12 hours  multivitamin 1 Tablet(s) Oral daily  tamsulosin 0.4 milliGRAM(s) Oral daily    MEDICATIONS  (PRN):  acetaminophen   Tablet .. 650 milliGRAM(s) Oral every 6 hours PRN Temp greater or equal to 38C (100.4F), Mild Pain (1 - 3)  HYDROmorphone   Tablet 2 milliGRAM(s) Oral every 6 hours PRN Severe Pain (7 - 10)  melatonin 3 milliGRAM(s) Oral at bedtime PRN Insomnia  ondansetron Injectable 4 milliGRAM(s) IV Push every 6 hours PRN Nausea and/or Vomiting  senna 2 Tablet(s) Oral at bedtime PRN Constipation      CAPILLARY BLOOD GLUCOSE        I&O's Summary    12 Feb 2021 07:01  -  13 Feb 2021 07:00  --------------------------------------------------------  IN: 540 mL / OUT: 950 mL / NET: -410 mL    13 Feb 2021 07:01  -  13 Feb 2021 16:04  --------------------------------------------------------  IN: 480 mL / OUT: 200 mL / NET: 280 mL        PHYSICAL EXAM:  Vital Signs Last 24 Hrs  T(C): 36.9 (13 Feb 2021 13:00), Max: 37.1 (13 Feb 2021 00:51)  T(F): 98.5 (13 Feb 2021 13:00), Max: 98.8 (13 Feb 2021 00:51)  HR: 90 (13 Feb 2021 13:00) (80 - 93)  BP: 130/86 (13 Feb 2021 13:00) (124/72 - 136/80)  BP(mean): --  RR: 16 (13 Feb 2021 13:00) (16 - 18)  SpO2: 97% (13 Feb 2021 13:00) (96% - 98%)    PHYSICAL EXAM:  HEENT: conjunctiva is clear and Neck with collar in place   PULM: Clear to auscultation bilaterally  CV: Regular rate and rhythm; nl S1, S2; 2/6 ALBERT  ABDOMEN: Soft, Nontender, Nondistended; Bowel sounds present  EXTREMITIES/MSK:  No edema, calf tenderness   PSYCH: calm   NEUROLOGY: non-focal, AAOx3, very fine resting tremor is noted         LABS:                        9.6    7.45  )-----------( 217      ( 12 Feb 2021 06:14 )             31.0     02-13    144  |  111<H>  |  31<H>  ----------------------------<  95  4.3   |  23  |  1.42<H>    Ca    8.6      13 Feb 2021 07:05  Phos  2.8     02-12                  RADIOLOGY & ADDITIONAL TESTS:    Imaging Personally Reviewed:    Electrocardiogram Personally Reviewed:    COORDINATION OF CARE:  Care Discussed with Consultants/Other Providers [Y/N]:  Prior or Outpatient Records Reviewed [Y/N]:

## 2021-02-13 NOTE — PROGRESS NOTE ADULT - PROBLEM SELECTOR PLAN 7
CW with heparin SUbcutaneous - Please closely monitor H/H   - if possible send anemia profile in AM labs ( Fe , Ferriting, TIBC , VIt B12 and folate level)   - Transfuse for hem <7.0 or if symptomatic or actively bleeding

## 2021-02-13 NOTE — PROGRESS NOTE ADULT - SUBJECTIVE AND OBJECTIVE BOX
SUBJECTIVE: Patient seen and examined at bedside. Denies any complaints at this time. + Collar in place.     OVERNIGHT EVENTS: none     Vital Signs Last 24 Hrs  T(C): 36.6 (13 Feb 2021 08:04), Max: 37.1 (13 Feb 2021 00:51)  T(F): 97.8 (13 Feb 2021 08:04), Max: 98.8 (13 Feb 2021 00:51)  HR: 93 (13 Feb 2021 08:04) (80 - 97)  BP: 136/80 (13 Feb 2021 08:38) (116/71 - 136/80)  BP(mean): --  RR: 16 (13 Feb 2021 08:04) (16 - 18)  SpO2: 98% (13 Feb 2021 08:04) (96% - 98%)    PHYSICAL EXAM:    General: No Acute Distress     Neurological: alert oriented to person, place and time, Following Commands, PERRL, EOMI, Face Symmetrical, Speech Fluent, Moving all extremities, Motor: UE 5/5  and lowers lle df/pf 5/5, hf 2/5 pain limited kf ke 3/5 pain limited and rle wnl , sensation wnl     Pulmonary: Clear to Auscultation, No Rales, No Rhonchi, No Wheezes     Cardiovascular: S1, S2, Regular Rate and Rhythm     Gastrointestinal: Soft, Nontender, Nondistended     LABS:                        9.6    7.45  )-----------( 217      ( 12 Feb 2021 06:14 )             31.0    02-13    144  |  111<H>  |  31<H>  ----------------------------<  95  4.3   |  23  |  1.42<H>    Ca    8.6      13 Feb 2021 07:05  Phos  2.8     02-12 02-12 @ 07:01  -  02-13 @ 07:00  --------------------------------------------------------  IN: 540 mL / OUT: 950 mL / NET: -410 mL    02-13 @ 07:01  -  02-13 @ 12:29  --------------------------------------------------------  IN: 480 mL / OUT: 0 mL / NET: 480 mL        MEDICATIONS  (STANDING):  heparin   Injectable 5000 Unit(s) SubCutaneous every 12 hours  multivitamin 1 Tablet(s) Oral daily  tamsulosin 0.4 milliGRAM(s) Oral daily    MEDICATIONS  (PRN):  acetaminophen   Tablet .. 650 milliGRAM(s) Oral every 6 hours PRN Temp greater or equal to 38C (100.4F), Mild Pain (1 - 3)  HYDROmorphone   Tablet 2 milliGRAM(s) Oral every 6 hours PRN Severe Pain (7 - 10)  melatonin 3 milliGRAM(s) Oral at bedtime PRN Insomnia  ondansetron Injectable 4 milliGRAM(s) IV Push every 6 hours PRN Nausea and/or Vomiting  senna 2 Tablet(s) Oral at bedtime PRN Constipation    DIET: regular diet     IMAGING:     < from: US Kidney and Bladder (02.10.21 @ 17:14) >  IMPRESSION:    Decrease in the dilatation of the left hydronephrosis. Mild left-sided hydronephrosis remains. No right-sided hydronephrosis.    The urinary bladder is decompressed by indwelling Lorenzo catheter. The urinary bladder wall appears thickened. Detailed evaluation of the bladder cannot be performed and should be considered with Lorenzo catheter clamped or removed.    Prostate gland cannot be evaluated.    < end of copied text >

## 2021-02-13 NOTE — PROVIDER CONTACT NOTE (OTHER) - ACTION/TREATMENT ORDERED:
As per provider no interventions necessary at this time. Safety maintained. Bed alarm active. Will continue to monitor.
As per provider, no interventions necessary at this time. Safety maintained. Bed alarm active. Will continue to monitor.
5 of IVP hydralazine
5 of hydralazine

## 2021-02-13 NOTE — PROGRESS NOTE ADULT - SUBJECTIVE AND OBJECTIVE BOX
DATE OF SERVICE: 02-13-21 @ 07:44    Subjective: Patient seen and examined. No new events except as noted.     SUBJECTIVE/ROS:        MEDICATIONS:  MEDICATIONS  (STANDING):  heparin   Injectable 5000 Unit(s) SubCutaneous every 12 hours  multivitamin 1 Tablet(s) Oral daily  tamsulosin 0.4 milliGRAM(s) Oral daily      PHYSICAL EXAM:  T(C): 37 (02-13-21 @ 05:30), Max: 37.1 (02-13-21 @ 00:51)  HR: 89 (02-13-21 @ 05:30) (80 - 97)  BP: 132/73 (02-13-21 @ 05:30) (116/71 - 132/73)  RR: 16 (02-13-21 @ 05:30) (16 - 18)  SpO2: 97% (02-13-21 @ 05:30) (96% - 98%)  Wt(kg): --  I&O's Summary    12 Feb 2021 07:01  -  13 Feb 2021 07:00  --------------------------------------------------------  IN: 540 mL / OUT: 950 mL / NET: -410 mL            JVP: Normal  Neck: supple  Lung: clear   CV: S1 S2 , Murmur:  Abd: soft  Ext: No edema  neuro: Awake / alert  Psych: flat affect  Skin: normal``    LABS/DATA:    CARDIAC MARKERS:                                9.6    7.45  )-----------( 217      ( 12 Feb 2021 06:14 )             31.0     02-12    141  |  109<H>  |  18  ----------------------------<  101<H>  4.0   |  25  |  1.29    Ca    8.2<L>      12 Feb 2021 06:14  Phos  2.8     02-12      proBNP:   Lipid Profile:   HgA1c:   TSH:     TELE:  EKG:

## 2021-02-13 NOTE — PROVIDER CONTACT NOTE (OTHER) - SITUATION
Pt /87
Pt noncompliant with staff and there was a change pt's neurological assessment
Pt is refusing care.
Pt /88

## 2021-02-13 NOTE — PROGRESS NOTE ADULT - PROBLEM SELECTOR PLAN 2
originally improved with dutton with creat on 2/12 was 1.29 ( highest Cr was 1.55 on 2/10)   Creat today = 1.42   Please closely monitor urine output / BUN/CR   COuld most likely be sec to volume depletion , unlikely to be sec to Hydronephrosis as pt has dutton in place   I would suggest starting patient on isotonic IVF  with NS or LR and repeat BMP in AM   It would be nice if Urine Na , Cr to check on Fena   as noted Patient has decreased Hem from 12.7 on adm to 9.6 now originally improved with dutton with creat on 2/12 was 1.29 ( highest Cr was 1.55 on 2/10)   Creat today = 1.42   Please closely monitor urine output / BUN/CR   COuld most likely be sec to volume depletion , unlikely to be sec to Hydronephrosis as pt has dutton in place   I would suggest starting patient on isotonic IVF  with NS or LR and repeat BMP in AM   It would be nice if Urine Na , Cr to check on Fena   as noted Patient has decreased Hem from 12.7 on adm to 9.6 now and needs to cont to monitor , it could be from Phlebotomy from monitor for persistent decrease

## 2021-02-14 ENCOUNTER — TRANSCRIPTION ENCOUNTER (OUTPATIENT)
Age: 76
End: 2021-02-14

## 2021-02-14 LAB
ANION GAP SERPL CALC-SCNC: 11 MMOL/L — SIGNIFICANT CHANGE UP (ref 5–17)
APTT BLD: 29.3 SEC — SIGNIFICANT CHANGE UP (ref 27.5–35.5)
BLD GP AB SCN SERPL QL: NEGATIVE — SIGNIFICANT CHANGE UP
BUN SERPL-MCNC: 34 MG/DL — HIGH (ref 7–23)
CALCIUM SERPL-MCNC: 8.5 MG/DL — SIGNIFICANT CHANGE UP (ref 8.4–10.5)
CHLORIDE SERPL-SCNC: 106 MMOL/L — SIGNIFICANT CHANGE UP (ref 96–108)
CO2 SERPL-SCNC: 24 MMOL/L — SIGNIFICANT CHANGE UP (ref 22–31)
CREAT SERPL-MCNC: 1.44 MG/DL — HIGH (ref 0.5–1.3)
GLUCOSE SERPL-MCNC: 88 MG/DL — SIGNIFICANT CHANGE UP (ref 70–99)
HCT VFR BLD CALC: 35.5 % — LOW (ref 39–50)
HGB BLD-MCNC: 11 G/DL — LOW (ref 13–17)
INR BLD: 0.98 RATIO — SIGNIFICANT CHANGE UP (ref 0.88–1.16)
MCHC RBC-ENTMCNC: 29 PG — SIGNIFICANT CHANGE UP (ref 27–34)
MCHC RBC-ENTMCNC: 31 GM/DL — LOW (ref 32–36)
MCV RBC AUTO: 93.7 FL — SIGNIFICANT CHANGE UP (ref 80–100)
NRBC # BLD: 0 /100 WBCS — SIGNIFICANT CHANGE UP (ref 0–0)
PLATELET # BLD AUTO: 345 K/UL — SIGNIFICANT CHANGE UP (ref 150–400)
POTASSIUM SERPL-MCNC: 4.3 MMOL/L — SIGNIFICANT CHANGE UP (ref 3.5–5.3)
POTASSIUM SERPL-SCNC: 4.3 MMOL/L — SIGNIFICANT CHANGE UP (ref 3.5–5.3)
PROTHROM AB SERPL-ACNC: 11.6 SEC — SIGNIFICANT CHANGE UP (ref 10.6–13.6)
RBC # BLD: 3.79 M/UL — LOW (ref 4.2–5.8)
RBC # FLD: 13.1 % — SIGNIFICANT CHANGE UP (ref 10.3–14.5)
RH IG SCN BLD-IMP: POSITIVE — SIGNIFICANT CHANGE UP
SARS-COV-2 RNA SPEC QL NAA+PROBE: SIGNIFICANT CHANGE UP
SODIUM SERPL-SCNC: 141 MMOL/L — SIGNIFICANT CHANGE UP (ref 135–145)
WBC # BLD: 8.15 K/UL — SIGNIFICANT CHANGE UP (ref 3.8–10.5)
WBC # FLD AUTO: 8.15 K/UL — SIGNIFICANT CHANGE UP (ref 3.8–10.5)

## 2021-02-14 PROCEDURE — 99232 SBSQ HOSP IP/OBS MODERATE 35: CPT

## 2021-02-14 PROCEDURE — 99233 SBSQ HOSP IP/OBS HIGH 50: CPT

## 2021-02-14 RX ORDER — CHLORHEXIDINE GLUCONATE 213 G/1000ML
1 SOLUTION TOPICAL AT BEDTIME
Refills: 0 | Status: COMPLETED | OUTPATIENT
Start: 2021-02-14 | End: 2021-02-14

## 2021-02-14 RX ORDER — MUPIROCIN 20 MG/G
1 OINTMENT TOPICAL EVERY 12 HOURS
Refills: 0 | Status: DISCONTINUED | OUTPATIENT
Start: 2021-02-14 | End: 2021-02-14

## 2021-02-14 RX ORDER — SODIUM CHLORIDE 9 MG/ML
1000 INJECTION INTRAMUSCULAR; INTRAVENOUS; SUBCUTANEOUS
Refills: 0 | Status: DISCONTINUED | OUTPATIENT
Start: 2021-02-14 | End: 2021-02-15

## 2021-02-14 RX ADMIN — Medication 650 MILLIGRAM(S): at 11:23

## 2021-02-14 RX ADMIN — HYDROMORPHONE HYDROCHLORIDE 2 MILLIGRAM(S): 2 INJECTION INTRAMUSCULAR; INTRAVENOUS; SUBCUTANEOUS at 07:23

## 2021-02-14 RX ADMIN — Medication 1 TABLET(S): at 11:23

## 2021-02-14 RX ADMIN — HEPARIN SODIUM 5000 UNIT(S): 5000 INJECTION INTRAVENOUS; SUBCUTANEOUS at 05:09

## 2021-02-14 RX ADMIN — TAMSULOSIN HYDROCHLORIDE 0.4 MILLIGRAM(S): 0.4 CAPSULE ORAL at 11:23

## 2021-02-14 RX ADMIN — SODIUM CHLORIDE 75 MILLILITER(S): 9 INJECTION INTRAMUSCULAR; INTRAVENOUS; SUBCUTANEOUS at 16:48

## 2021-02-14 RX ADMIN — CHLORHEXIDINE GLUCONATE 1 APPLICATION(S): 213 SOLUTION TOPICAL at 20:31

## 2021-02-14 RX ADMIN — SODIUM CHLORIDE 75 MILLILITER(S): 9 INJECTION INTRAMUSCULAR; INTRAVENOUS; SUBCUTANEOUS at 05:16

## 2021-02-14 RX ADMIN — HYDROMORPHONE HYDROCHLORIDE 2 MILLIGRAM(S): 2 INJECTION INTRAMUSCULAR; INTRAVENOUS; SUBCUTANEOUS at 18:54

## 2021-02-14 NOTE — PROGRESS NOTE ADULT - ASSESSMENT
HPI:  Patient is a 75y old  Male who presents with a chief complaint of fall     HPI: 75 M with no PMH presents s/p mechanical fall on Thursday when he slipped down stairs, landed on his back. He hit his head and had some bleeding however no loss of consciousness. Since then he has had difficulty ambulating. He has been having pain in the neck, chest wall, and L hip. Patient is not on any blood thinners. No fevers, chills, chest pain, shortness of breath. No n/v/diarrhea.    Of note, patient found to have L hydroureteronephrosis as well as distended bladder on imaging. Patient denies any difficulty urinating however has not urinated since ED admission. He denies any dysuria or hematuria however has been having increased urinary frequency.     (09 Feb 2021 03:45)    PLAN:  -neuro and vital check Q4hrs   -Collar at all times  -Plan for OR tomorrow  -Dilaudid for pain control  -Dr. Garrison following. Cleared for OR.   -ALEKSANDR (acute kidney injury) and urinary retention, continue dutton and flomax, Creatinine this am 1.44, continue IVF, will f/u repeat tomorrow, would try to avoid hypotensive episodes given ALEKSANDR. pt denies h/o CKD but reports seeing a urologist for an enlarged prostate. avoid NSAIDs  -Hydronephrosis of left kidney, renal US on 2/10 shows improved left hydro. will need outpt f/u.   -Encouraged incentive spirometry  -Ortho saw for left inferior and high superior pubic rami/ant column non displaced fractures. Recommend 1) No acute orthopedic surgical intervention indicated at this time.  2) WBAT LLE, assistive devices as needed  3) Pain control.  -Regular diet, NPO after midnight  -last BM on 2/14, continue current bowel regimens  -DVT ppx; venodyes and sqh held for OR tomorrow  -PT: after OR     Will discuss above with Dr. Derrell moreno [phone]

## 2021-02-14 NOTE — PROGRESS NOTE ADULT - ASSESSMENT
Cervical spine fx  Dens fx  planned for surgery   pain control     HTN  would not treat too aggressively now given high risk of orthostatic hypotension     CKD  ? due to urinary retention / hydronephrosis   s/p Lorenzo   fu with   crt improving    Pre-Operative Cardiac Risk Stratification and Optimization   Based on current ACC/AHA guidelines, patient history and physical exam, the patient is considered to have low cardiac risk  pt denies any chest pain, sob, palpitation, dizziness or syncope.  exercise capacity is greater than 4 METs.  can proceed to OR for this urgent surgery      Advanced care planning was discussed with patient and family.  Risks, benefits and alternatives of the cardiac treatments and medical therapy including procedures were discussed in detail and all questions were answered. Importance of compliance with medical therapy and lifestyle modification to improve cardiovascular health were addressed. Appropriate forms and patient educational materials were reviewed. 30 minutes face to face spent.

## 2021-02-14 NOTE — PROGRESS NOTE ADULT - SUBJECTIVE AND OBJECTIVE BOX
Patient was seen at bedside this am. Patient denied any numbness/tingling, sob, n/v, or abd pain.    OVERNIGHT EVENTS: No acute event overnight    Vital Signs Last 24 Hrs  T(C): 37 (14 Feb 2021 12:25), Max: 37.1 (13 Feb 2021 20:35)  T(F): 98.6 (14 Feb 2021 12:25), Max: 98.8 (14 Feb 2021 08:33)  HR: 78 (14 Feb 2021 12:25) (78 - 90)  BP: 146/67 (14 Feb 2021 12:25) (127/75 - 149/82)  BP(mean): --  RR: 18 (14 Feb 2021 12:25) (16 - 18)  SpO2: 94% (14 Feb 2021 12:25) (94% - 97%)    I&O's Detail    13 Feb 2021 07:01  -  14 Feb 2021 07:00  --------------------------------------------------------  IN:    Oral Fluid: 1180 mL    sodium chloride 0.9%: 900 mL  Total IN: 2080 mL    OUT:    Indwelling Catheter - Urethral (mL): 1650 mL  Total OUT: 1650 mL    Total NET: 430 mL      14 Feb 2021 07:01  -  14 Feb 2021 15:42  --------------------------------------------------------  IN:    Oral Fluid: 360 mL  Total IN: 360 mL    OUT:    Indwelling Catheter - Urethral (mL): 850 mL  Total OUT: 850 mL    Total NET: -490 mL        I&O's Summary    13 Feb 2021 07:01  -  14 Feb 2021 07:00  --------------------------------------------------------  IN: 2080 mL / OUT: 1650 mL / NET: 430 mL    14 Feb 2021 07:01  -  14 Feb 2021 15:42  --------------------------------------------------------  IN: 360 mL / OUT: 850 mL / NET: -490 mL        PHYSICAL EXAM:  Neurological:  awake, alert, oriented to person, place, and date, on c-collar, PERRL, face symmetrical, following commands, moving upper extremities 5/5, RLE 5/5, LLE HF 3/5, otherwise 4+/5, sensation intact to light touch  Cardiovascular: +s1, s2  Respiratory: clear to auscultation b/l  Gastrointestinal: soft, non-distended, non-tender  Extremities: warm, dry    LABS:                        11.0   8.15  )-----------( 345      ( 14 Feb 2021 06:40 )             35.5     02-14    141  |  106  |  34<H>  ----------------------------<  88  4.3   |  24  |  1.44<H>    Ca    8.5      14 Feb 2021 06:39      PT/INR - ( 14 Feb 2021 08:04 )   PT: 11.6 sec;   INR: 0.98 ratio         PTT - ( 14 Feb 2021 08:04 )  PTT:29.3 sec        CAPILLARY BLOOD GLUCOSE          Drug Levels: [] N/A    CSF Analysis: [] N/A      Allergies    No Known Allergies    Intolerances      MEDICATIONS:  Antibiotics:    Neuro:  acetaminophen   Tablet .. 650 milliGRAM(s) Oral every 6 hours PRN  HYDROmorphone   Tablet 2 milliGRAM(s) Oral every 6 hours PRN  melatonin 3 milliGRAM(s) Oral at bedtime PRN  ondansetron Injectable 4 milliGRAM(s) IV Push every 6 hours PRN    Anticoagulation:    OTHER:  chlorhexidine 4% Liquid 1 Application(s) Topical at bedtime  senna 2 Tablet(s) Oral at bedtime PRN  tamsulosin 0.4 milliGRAM(s) Oral daily    IVF:  multivitamin 1 Tablet(s) Oral daily  sodium chloride 0.9%. 1000 milliLiter(s) IV Continuous <Continuous>    CULTURES:  Culture Results:   No growth (02-09 @ 05:21)    RADIOLOGY & ADDITIONAL TESTS:

## 2021-02-14 NOTE — PROGRESS NOTE ADULT - ASSESSMENT
75 M with h/o constipation presents with mechanical fall found to have an impacted angulated C2 fracture extending into lateral masses involving vascular foramen bilaterally. Incidentally found to have a distended bladder and marked L hydroureteronephrosis S/P dutton placement .  Patient was found to have  type 2 dens fracture and left pelvic fracture resulted from the fall  with plan for OR On 2/15.  Medicine was called back on 2/13 due to increase in Creatinine unclear etiology with bladder scan 115, FeNa 1.37%.  Possibly prerenal, and would continue IVF for now.

## 2021-02-14 NOTE — PROGRESS NOTE ADULT - SUBJECTIVE AND OBJECTIVE BOX
DATE OF SERVICE: 02-14-21 @ 09:09    Subjective: Patient seen and examined. No new events except as noted.     SUBJECTIVE/ROS:  feels ok     MEDICATIONS:  MEDICATIONS  (STANDING):  chlorhexidine 4% Liquid 1 Application(s) Topical at bedtime  multivitamin 1 Tablet(s) Oral daily  sodium chloride 0.9%. 1000 milliLiter(s) (75 mL/Hr) IV Continuous <Continuous>  tamsulosin 0.4 milliGRAM(s) Oral daily      PHYSICAL EXAM:  T(C): 37.1 (02-14-21 @ 08:33), Max: 37.1 (02-13-21 @ 20:35)  HR: 90 (02-14-21 @ 08:33) (78 - 90)  BP: 149/82 (02-14-21 @ 08:33) (127/75 - 149/82)  RR: 18 (02-14-21 @ 08:33) (16 - 18)  SpO2: 96% (02-14-21 @ 08:33) (94% - 97%)  Wt(kg): --  I&O's Summary    13 Feb 2021 07:01  -  14 Feb 2021 07:00  --------------------------------------------------------  IN: 2080 mL / OUT: 1650 mL / NET: 430 mL            JVP: Normal  Neck: supple  Lung: clear   CV: S1 S2 , Murmur:  Abd: soft  Ext: No edema  neuro: Awake / alert  Psych: flat affect  Skin: normal``    LABS/DATA:    CARDIAC MARKERS:                                11.0   8.15  )-----------( 345      ( 14 Feb 2021 06:40 )             35.5     02-14    141  |  106  |  34<H>  ----------------------------<  88  4.3   |  24  |  1.44<H>    Ca    8.5      14 Feb 2021 06:39      proBNP:   Lipid Profile:   HgA1c:   TSH:     TELE:  EKG:

## 2021-02-14 NOTE — PROGRESS NOTE ADULT - PROBLEM SELECTOR PLAN 2
originally improved with dutton with creat on 2/12 was 1.29 ( highest Cr was 1.55 on 2/10)   Creat today = 1.44 (stable from yesterday)   Please closely monitor urine output / BUN/CR   COuld most likely be sec to volume depletion , unlikely to be sec to Hydronephrosis as pt has dutton in place  FeNa 1.37% and no retention noted on bladder scan at bedside (115cc)  continue NS and repeat BMP in AM

## 2021-02-14 NOTE — PROGRESS NOTE ADULT - SUBJECTIVE AND OBJECTIVE BOX
[facility] Division of Hospital Medicine  Consuelo Jj MD  Pager (M-F, 8A-5P): [phone]  Other Times:  [phone]    Patient is a 76y old  Male who presents with a chief complaint of dens fracture s/p fall (14 Feb 2021 15:42)      SUBJECTIVE / OVERNIGHT EVENTS:  reports dull neck pain, no n/v/abd pain/cp/sob  had large BM today    ADDITIONAL REVIEW OF SYSTEMS:    MEDICATIONS  (STANDING):  chlorhexidine 4% Liquid 1 Application(s) Topical at bedtime  multivitamin 1 Tablet(s) Oral daily  sodium chloride 0.9%. 1000 milliLiter(s) (75 mL/Hr) IV Continuous <Continuous>  tamsulosin 0.4 milliGRAM(s) Oral daily    MEDICATIONS  (PRN):  acetaminophen   Tablet .. 650 milliGRAM(s) Oral every 6 hours PRN Temp greater or equal to 38C (100.4F), Mild Pain (1 - 3)  HYDROmorphone   Tablet 2 milliGRAM(s) Oral every 6 hours PRN Severe Pain (7 - 10)  melatonin 3 milliGRAM(s) Oral at bedtime PRN Insomnia  ondansetron Injectable 4 milliGRAM(s) IV Push every 6 hours PRN Nausea and/or Vomiting  senna 2 Tablet(s) Oral at bedtime PRN Constipation      CAPILLARY BLOOD GLUCOSE        I&O's Summary    13 Feb 2021 07:01  -  14 Feb 2021 07:00  --------------------------------------------------------  IN: 2080 mL / OUT: 1650 mL / NET: 430 mL    14 Feb 2021 07:01  -  14 Feb 2021 16:38  --------------------------------------------------------  IN: 560 mL / OUT: 1050 mL / NET: -490 mL        PHYSICAL EXAM:  Vital Signs Last 24 Hrs  T(C): 36.8 (14 Feb 2021 16:15), Max: 37.1 (13 Feb 2021 20:35)  T(F): 98.3 (14 Feb 2021 16:15), Max: 98.8 (14 Feb 2021 08:33)  HR: 83 (14 Feb 2021 16:15) (78 - 90)  BP: 126/73 (14 Feb 2021 16:15) (126/73 - 149/82)  BP(mean): --  RR: 18 (14 Feb 2021 16:15) (17 - 18)  SpO2: 98% (14 Feb 2021 16:15) (94% - 98%)  HEENT: conjunctiva is clear and Neck with collar in place   PULM: Clear to auscultation bilaterally  CV: Regular rate and rhythm; nl S1, S2; 2/6 ALBERT  ABDOMEN: Soft, Nontender, Nondistended; Bowel sounds present  EXTREMITIES/MSK:  No edema, calf tenderness   PSYCH: calm   NEUROLOGY: non-focal, AAOx3, very fine resting tremor is noted       LABS:                        11.0   8.15  )-----------( 345      ( 14 Feb 2021 06:40 )             35.5     02-14    141  |  106  |  34<H>  ----------------------------<  88  4.3   |  24  |  1.44<H>    Ca    8.5      14 Feb 2021 06:39      PT/INR - ( 14 Feb 2021 08:04 )   PT: 11.6 sec;   INR: 0.98 ratio         PTT - ( 14 Feb 2021 08:04 )  PTT:29.3 sec            RADIOLOGY & ADDITIONAL TESTS:  Results Reviewed:   Imaging Personally Reviewed:  Electrocardiogram Personally Reviewed:    COORDINATION OF CARE:  Care Discussed with Consultants/Other Providers [Y/N]:  Prior or Outpatient Records Reviewed [Y/N]:

## 2021-02-14 NOTE — PROGRESS NOTE ADULT - PROBLEM SELECTOR PLAN 7
- Please closely monitor H/H   - would send anemia profile in AM labs ( Fe , Ferritin, TIBC , VIt B12 and folate level)   - Transfuse for hem <7.0 or if symptomatic or actively bleeding

## 2021-02-15 ENCOUNTER — APPOINTMENT (OUTPATIENT)
Dept: SPINE | Facility: HOSPITAL | Age: 76
End: 2021-02-15

## 2021-02-15 LAB
ANION GAP SERPL CALC-SCNC: 10 MMOL/L — SIGNIFICANT CHANGE UP (ref 5–17)
ANION GAP SERPL CALC-SCNC: 11 MMOL/L — SIGNIFICANT CHANGE UP (ref 5–17)
BASOPHILS # BLD AUTO: 0.02 K/UL — SIGNIFICANT CHANGE UP (ref 0–0.2)
BASOPHILS NFR BLD AUTO: 0.2 % — SIGNIFICANT CHANGE UP (ref 0–2)
BUN SERPL-MCNC: 23 MG/DL — SIGNIFICANT CHANGE UP (ref 7–23)
BUN SERPL-MCNC: 24 MG/DL — HIGH (ref 7–23)
CALCIUM SERPL-MCNC: 8.3 MG/DL — LOW (ref 8.4–10.5)
CALCIUM SERPL-MCNC: 8.3 MG/DL — LOW (ref 8.4–10.5)
CHLORIDE SERPL-SCNC: 106 MMOL/L — SIGNIFICANT CHANGE UP (ref 96–108)
CHLORIDE SERPL-SCNC: 106 MMOL/L — SIGNIFICANT CHANGE UP (ref 96–108)
CO2 SERPL-SCNC: 19 MMOL/L — LOW (ref 22–31)
CO2 SERPL-SCNC: 22 MMOL/L — SIGNIFICANT CHANGE UP (ref 22–31)
CREAT SERPL-MCNC: 1.14 MG/DL — SIGNIFICANT CHANGE UP (ref 0.5–1.3)
CREAT SERPL-MCNC: 1.32 MG/DL — HIGH (ref 0.5–1.3)
EOSINOPHIL # BLD AUTO: 0.09 K/UL — SIGNIFICANT CHANGE UP (ref 0–0.5)
EOSINOPHIL NFR BLD AUTO: 0.7 % — SIGNIFICANT CHANGE UP (ref 0–6)
GLUCOSE SERPL-MCNC: 160 MG/DL — HIGH (ref 70–99)
GLUCOSE SERPL-MCNC: 90 MG/DL — SIGNIFICANT CHANGE UP (ref 70–99)
HCT VFR BLD CALC: 31.7 % — LOW (ref 39–50)
HGB BLD-MCNC: 10.1 G/DL — LOW (ref 13–17)
IMM GRANULOCYTES NFR BLD AUTO: 0.7 % — SIGNIFICANT CHANGE UP (ref 0–1.5)
LYMPHOCYTES # BLD AUTO: 0.52 K/UL — LOW (ref 1–3.3)
LYMPHOCYTES # BLD AUTO: 4 % — LOW (ref 13–44)
MCHC RBC-ENTMCNC: 29.1 PG — SIGNIFICANT CHANGE UP (ref 27–34)
MCHC RBC-ENTMCNC: 31.9 GM/DL — LOW (ref 32–36)
MCV RBC AUTO: 91.4 FL — SIGNIFICANT CHANGE UP (ref 80–100)
MONOCYTES # BLD AUTO: 0.12 K/UL — SIGNIFICANT CHANGE UP (ref 0–0.9)
MONOCYTES NFR BLD AUTO: 0.9 % — LOW (ref 2–14)
MRSA PCR RESULT.: SIGNIFICANT CHANGE UP
NEUTROPHILS # BLD AUTO: 12.03 K/UL — HIGH (ref 1.8–7.4)
NEUTROPHILS NFR BLD AUTO: 93.5 % — HIGH (ref 43–77)
NRBC # BLD: 0 /100 WBCS — SIGNIFICANT CHANGE UP (ref 0–0)
PLATELET # BLD AUTO: 329 K/UL — SIGNIFICANT CHANGE UP (ref 150–400)
POTASSIUM SERPL-MCNC: 4 MMOL/L — SIGNIFICANT CHANGE UP (ref 3.5–5.3)
POTASSIUM SERPL-MCNC: 4.1 MMOL/L — SIGNIFICANT CHANGE UP (ref 3.5–5.3)
POTASSIUM SERPL-SCNC: 4 MMOL/L — SIGNIFICANT CHANGE UP (ref 3.5–5.3)
POTASSIUM SERPL-SCNC: 4.1 MMOL/L — SIGNIFICANT CHANGE UP (ref 3.5–5.3)
RBC # BLD: 3.47 M/UL — LOW (ref 4.2–5.8)
RBC # FLD: 13.1 % — SIGNIFICANT CHANGE UP (ref 10.3–14.5)
S AUREUS DNA NOSE QL NAA+PROBE: SIGNIFICANT CHANGE UP
SODIUM SERPL-SCNC: 136 MMOL/L — SIGNIFICANT CHANGE UP (ref 135–145)
SODIUM SERPL-SCNC: 138 MMOL/L — SIGNIFICANT CHANGE UP (ref 135–145)
WBC # BLD: 12.87 K/UL — HIGH (ref 3.8–10.5)
WBC # FLD AUTO: 12.87 K/UL — HIGH (ref 3.8–10.5)

## 2021-02-15 PROCEDURE — 72125 CT NECK SPINE W/O DYE: CPT | Mod: 26

## 2021-02-15 PROCEDURE — 22842 INSERT SPINE FIXATION DEVICE: CPT

## 2021-02-15 PROCEDURE — 22590 ARTHRD PST TQ CRANIOCERVICAL: CPT

## 2021-02-15 PROCEDURE — 22614 ARTHRD PST TQ 1NTRSPC EA ADD: CPT

## 2021-02-15 PROCEDURE — 22600 ARTHRD PST TQ 1NTRSPC CRV: CPT

## 2021-02-15 RX ORDER — HYDROMORPHONE HYDROCHLORIDE 2 MG/ML
0.5 INJECTION INTRAMUSCULAR; INTRAVENOUS; SUBCUTANEOUS
Refills: 0 | Status: DISCONTINUED | OUTPATIENT
Start: 2021-02-15 | End: 2021-02-15

## 2021-02-15 RX ORDER — ENOXAPARIN SODIUM 100 MG/ML
40 INJECTION SUBCUTANEOUS DAILY
Refills: 0 | Status: DISCONTINUED | OUTPATIENT
Start: 2021-02-16 | End: 2021-02-16

## 2021-02-15 RX ORDER — TAMSULOSIN HYDROCHLORIDE 0.4 MG/1
0.4 CAPSULE ORAL DAILY
Refills: 0 | Status: DISCONTINUED | OUTPATIENT
Start: 2021-02-15 | End: 2021-02-19

## 2021-02-15 RX ORDER — ONDANSETRON 8 MG/1
4 TABLET, FILM COATED ORAL EVERY 6 HOURS
Refills: 0 | Status: DISCONTINUED | OUTPATIENT
Start: 2021-02-15 | End: 2021-02-19

## 2021-02-15 RX ORDER — SENNA PLUS 8.6 MG/1
2 TABLET ORAL AT BEDTIME
Refills: 0 | Status: DISCONTINUED | OUTPATIENT
Start: 2021-02-15 | End: 2021-02-15

## 2021-02-15 RX ORDER — ONDANSETRON 8 MG/1
4 TABLET, FILM COATED ORAL ONCE
Refills: 0 | Status: DISCONTINUED | OUTPATIENT
Start: 2021-02-15 | End: 2021-02-15

## 2021-02-15 RX ORDER — LANOLIN ALCOHOL/MO/W.PET/CERES
3 CREAM (GRAM) TOPICAL AT BEDTIME
Refills: 0 | Status: DISCONTINUED | OUTPATIENT
Start: 2021-02-15 | End: 2021-02-19

## 2021-02-15 RX ORDER — ACETAMINOPHEN 500 MG
650 TABLET ORAL EVERY 6 HOURS
Refills: 0 | Status: DISCONTINUED | OUTPATIENT
Start: 2021-02-15 | End: 2021-02-19

## 2021-02-15 RX ORDER — NALOXONE HYDROCHLORIDE 4 MG/.1ML
0.1 SPRAY NASAL
Refills: 0 | Status: DISCONTINUED | OUTPATIENT
Start: 2021-02-15 | End: 2021-02-19

## 2021-02-15 RX ORDER — HYDROMORPHONE HYDROCHLORIDE 2 MG/ML
0.5 INJECTION INTRAMUSCULAR; INTRAVENOUS; SUBCUTANEOUS
Refills: 0 | Status: DISCONTINUED | OUTPATIENT
Start: 2021-02-15 | End: 2021-02-16

## 2021-02-15 RX ORDER — MUPIROCIN 20 MG/G
1 OINTMENT TOPICAL
Refills: 0 | Status: DISCONTINUED | OUTPATIENT
Start: 2021-02-15 | End: 2021-02-15

## 2021-02-15 RX ORDER — HYDROMORPHONE HYDROCHLORIDE 2 MG/ML
30 INJECTION INTRAMUSCULAR; INTRAVENOUS; SUBCUTANEOUS
Refills: 0 | Status: DISCONTINUED | OUTPATIENT
Start: 2021-02-15 | End: 2021-02-16

## 2021-02-15 RX ORDER — CEFAZOLIN SODIUM 1 G
2000 VIAL (EA) INJECTION EVERY 8 HOURS
Refills: 0 | Status: COMPLETED | OUTPATIENT
Start: 2021-02-15 | End: 2021-02-16

## 2021-02-15 RX ORDER — DEXTROSE MONOHYDRATE, SODIUM CHLORIDE, AND POTASSIUM CHLORIDE 50; .745; 4.5 G/1000ML; G/1000ML; G/1000ML
1000 INJECTION, SOLUTION INTRAVENOUS
Refills: 0 | Status: DISCONTINUED | OUTPATIENT
Start: 2021-02-15 | End: 2021-02-16

## 2021-02-15 RX ORDER — MUPIROCIN 20 MG/G
1 OINTMENT TOPICAL
Refills: 0 | Status: DISCONTINUED | OUTPATIENT
Start: 2021-02-15 | End: 2021-02-16

## 2021-02-15 RX ORDER — DIAZEPAM 5 MG
5 TABLET ORAL EVERY 6 HOURS
Refills: 0 | Status: DISCONTINUED | OUTPATIENT
Start: 2021-02-15 | End: 2021-02-16

## 2021-02-15 RX ORDER — SENNA PLUS 8.6 MG/1
2 TABLET ORAL AT BEDTIME
Refills: 0 | Status: DISCONTINUED | OUTPATIENT
Start: 2021-02-15 | End: 2021-02-19

## 2021-02-15 RX ORDER — ONDANSETRON 8 MG/1
4 TABLET, FILM COATED ORAL EVERY 6 HOURS
Refills: 0 | Status: DISCONTINUED | OUTPATIENT
Start: 2021-02-15 | End: 2021-02-15

## 2021-02-15 RX ORDER — ACETAMINOPHEN 500 MG
1000 TABLET ORAL ONCE
Refills: 0 | Status: COMPLETED | OUTPATIENT
Start: 2021-02-15 | End: 2021-02-15

## 2021-02-15 RX ADMIN — Medication 5 MILLIGRAM(S): at 14:28

## 2021-02-15 RX ADMIN — HYDROMORPHONE HYDROCHLORIDE 0.5 MILLIGRAM(S): 2 INJECTION INTRAMUSCULAR; INTRAVENOUS; SUBCUTANEOUS at 12:20

## 2021-02-15 RX ADMIN — HYDROMORPHONE HYDROCHLORIDE 30 MILLILITER(S): 2 INJECTION INTRAMUSCULAR; INTRAVENOUS; SUBCUTANEOUS at 16:00

## 2021-02-15 RX ADMIN — MUPIROCIN 1 APPLICATION(S): 20 OINTMENT TOPICAL at 06:13

## 2021-02-15 RX ADMIN — HYDROMORPHONE HYDROCHLORIDE 30 MILLILITER(S): 2 INJECTION INTRAMUSCULAR; INTRAVENOUS; SUBCUTANEOUS at 16:48

## 2021-02-15 RX ADMIN — HYDROMORPHONE HYDROCHLORIDE 30 MILLILITER(S): 2 INJECTION INTRAMUSCULAR; INTRAVENOUS; SUBCUTANEOUS at 14:36

## 2021-02-15 RX ADMIN — Medication 400 MILLIGRAM(S): at 18:04

## 2021-02-15 RX ADMIN — Medication 100 MILLIGRAM(S): at 18:04

## 2021-02-15 RX ADMIN — HYDROMORPHONE HYDROCHLORIDE 2 MILLIGRAM(S): 2 INJECTION INTRAMUSCULAR; INTRAVENOUS; SUBCUTANEOUS at 05:36

## 2021-02-15 RX ADMIN — SODIUM CHLORIDE 75 MILLILITER(S): 9 INJECTION INTRAMUSCULAR; INTRAVENOUS; SUBCUTANEOUS at 05:42

## 2021-02-15 RX ADMIN — DEXTROSE MONOHYDRATE, SODIUM CHLORIDE, AND POTASSIUM CHLORIDE 75 MILLILITER(S): 50; .745; 4.5 INJECTION, SOLUTION INTRAVENOUS at 12:48

## 2021-02-15 RX ADMIN — HYDROMORPHONE HYDROCHLORIDE 30 MILLILITER(S): 2 INJECTION INTRAMUSCULAR; INTRAVENOUS; SUBCUTANEOUS at 19:27

## 2021-02-15 NOTE — PHYSICAL THERAPY INITIAL EVALUATION ADULT - FOLLOWS COMMANDS/ANSWERS QUESTIONS, REHAB EVAL
100% of the time/able to follow multistep instructions 100% of the time/able to follow single-step instructions

## 2021-02-15 NOTE — PHYSICAL THERAPY INITIAL EVALUATION ADULT - IMPAIRMENTS CONTRIBUTING TO GAIT DEVIATIONS, PT EVAL
impaired balance/decreased strength decreased endurance. pt very anxious/impaired balance/decreased ROM/decreased strength

## 2021-02-15 NOTE — PHYSICAL THERAPY INITIAL EVALUATION ADULT - GENERAL OBSERVATIONS, REHAB EVAL
Pt encountered supine in bed, + C collar, + PIV, PCA pump, hemovac and dutton. AOx3, very weak voice.

## 2021-02-15 NOTE — PROGRESS NOTE ADULT - SUBJECTIVE AND OBJECTIVE BOX
DATE OF SERVICE: 02-15-21 @ 08:14    Subjective: Patient seen and examined. No new events except as noted.     SUBJECTIVE/ROS:        MEDICATIONS:  MEDICATIONS  (STANDING):  multivitamin 1 Tablet(s) Oral daily  mupirocin 2% Ointment 1 Application(s) Topical two times a day  sodium chloride 0.9%. 1000 milliLiter(s) (75 mL/Hr) IV Continuous <Continuous>  tamsulosin 0.4 milliGRAM(s) Oral daily      PHYSICAL EXAM:  T(C): 36.6 (02-15-21 @ 08:11), Max: 37.1 (02-14-21 @ 08:33)  HR: 79 (02-15-21 @ 08:11) (77 - 90)  BP: 162/86 (02-15-21 @ 08:11) (124/75 - 162/86)  RR: 18 (02-15-21 @ 08:11) (18 - 18)  SpO2: 99% (02-15-21 @ 08:11) (94% - 99%)  Wt(kg): --  I&O's Summary    14 Feb 2021 07:01  -  15 Feb 2021 07:00  --------------------------------------------------------  IN: 860 mL / OUT: 2450 mL / NET: -1590 mL      Height (cm): 185.4 (02-15 @ 08:11)  Weight (kg): 63.5 (02-15 @ 08:11)  BMI (kg/m2): 18.5 (02-15 @ 08:11)  BSA (m2): 1.85 (02-15 @ 08:11)      JVP: Normal  Neck: supple  Lung: clear   CV: S1 S2 , Murmur:  Abd: soft  Ext: No edema  neuro: Awake / alert  Psych: flat affect  Skin: normal``    LABS/DATA:    CARDIAC MARKERS:                                11.0   8.15  )-----------( 345      ( 14 Feb 2021 06:40 )             35.5     02-15    138  |  106  |  24<H>  ----------------------------<  90  4.1   |  22  |  1.32<H>    Ca    8.3<L>      15 Feb 2021 05:56      proBNP:   Lipid Profile:   HgA1c:   TSH:     TELE:  EKG:

## 2021-02-15 NOTE — PHYSICAL THERAPY INITIAL EVALUATION ADULT - GAIT DEVIATIONS NOTED, PT EVAL
decreased step length/decreased weight-shifting ability increased time in double stance/decreased velocity of limb motion/decreased step length/decreased stride length/decreased weight-shifting ability

## 2021-02-15 NOTE — PHYSICAL THERAPY INITIAL EVALUATION ADULT - ADDITIONAL COMMENTS
as per pt: PTA pt was living in a PH + Stairs (Pt states has 3 floors + hand rail,) and was independent in all functional mobility and ADL's. no AD for gait. lives alone.. Pt voice very low throughout entire PT eval, unclear communication. as per pt: PTA pt was living in a PH + Stairs (Pt states has 2 floors + hand rail,) kitchen bathroom on 1st a bedroom on second. and was independent in all functional mobility and ADL's. no AD for gait. lives alone.. Pt voice very low throughout entire PT eval, unclear communication. spoke to brother on phone who confirmed all above information.

## 2021-02-15 NOTE — PHYSICAL THERAPY INITIAL EVALUATION ADULT - PRECAUTIONS/LIMITATIONS, REHAB EVAL
fall precautions/spinal precautions/surgical precautions renal US on 2/10 shows improved left hydro/fall precautions/spinal precautions/surgical precautions

## 2021-02-15 NOTE — PHYSICAL THERAPY INITIAL EVALUATION ADULT - PERTINENT HX OF CURRENT PROBLEM, REHAB EVAL
75 M with no PMH presents s/p mechanical fall on when he slipped down stairs, landed on his back. He hit his head and had some bleeding however no loss of consciousness. Since then he has had difficulty ambulating. found to have  type 2-3 dens fracture (MRI cervical) and left pelvis fracture (CT Pelvis: Acute mildly displaced fracture of the left inferior pubic ramus. Acute minimally displaced fractures of the left superior pubic ramus and left acetabulum), now s/p surgery mentioned above

## 2021-02-15 NOTE — PROGRESS NOTE ADULT - ASSESSMENT
Cervical spine fx  Dens fx  planned for surgery   pain control     HTN  would not treat too aggressively now given high risk of orthostatic hypotension     CKD  as per med     Pre-Operative Cardiac Risk Stratification and Optimization   Based on current ACC/AHA guidelines, patient history and physical exam, the patient is considered to have low cardiac risk  pt denies any chest pain, sob, palpitation, dizziness or syncope.  exercise capacity is greater than 4 METs.  can proceed to OR for this urgent surgery      Advanced care planning was discussed with patient and family.  Risks, benefits and alternatives of the cardiac treatments and medical therapy including procedures were discussed in detail and all questions were answered. Importance of compliance with medical therapy and lifestyle modification to improve cardiovascular health were addressed. Appropriate forms and patient educational materials were reviewed. 30 minutes face to face spent.

## 2021-02-15 NOTE — BRIEF OPERATIVE NOTE - NSICDXBRIEFPROCEDURE_GEN_ALL_CORE_FT
PROCEDURES:  Arthrodesis, cervical spine, below C2, posterior technique 15-Feb-2021 08:26:13  Jaya Delcid

## 2021-02-15 NOTE — PRE-ANESTHESIA EVALUATION ADULT - NSANTHPMHFT_GEN_ALL_CORE
75 M with h/o HTN now with mechanical fall found to have an impacted angulated C2 fracture extending into lateral masses involving vascular foramen bilaterally. Incidentally found to have a distended bladder and marked L hydroureteronephrosis S/P dutton placement, cleared by medicine and cardiology for surgery today. Hydronephrosis addressed by urology and medicine as well

## 2021-02-15 NOTE — PROGRESS NOTE ADULT - SUBJECTIVE AND OBJECTIVE BOX
Pt seen and examined postop in PACU at bedside    Exam: In ccollar, AOx3, wide awake, BUE ag strong, no hoffmans, BLE 4/5 effort limited, no clonus, SILT.

## 2021-02-15 NOTE — PROGRESS NOTE ADULT - ASSESSMENT
77YO M s/p O-C4 fusion, doing well postop, postop CT looks good.  Exam: In ccollar, AOx3, wide awake, BUE ag strong, no hoffmans, BLE 4/5 effort limited, no clonus, SILT.     - PACU 4h then floor  - postop cat scan done, looks good  - HMVx1, dutton  - PCA, pain control  - q4h neuro checks, PT/OT  - nylons on his incision should be removed before he leaves (there are staples over them.

## 2021-02-16 LAB
ANION GAP SERPL CALC-SCNC: 10 MMOL/L — SIGNIFICANT CHANGE UP (ref 5–17)
BASOPHILS # BLD AUTO: 0.02 K/UL — SIGNIFICANT CHANGE UP (ref 0–0.2)
BASOPHILS NFR BLD AUTO: 0.1 % — SIGNIFICANT CHANGE UP (ref 0–2)
BUN SERPL-MCNC: 22 MG/DL — SIGNIFICANT CHANGE UP (ref 7–23)
CALCIUM SERPL-MCNC: 8.6 MG/DL — SIGNIFICANT CHANGE UP (ref 8.4–10.5)
CHLORIDE SERPL-SCNC: 106 MMOL/L — SIGNIFICANT CHANGE UP (ref 96–108)
CO2 SERPL-SCNC: 22 MMOL/L — SIGNIFICANT CHANGE UP (ref 22–31)
CREAT SERPL-MCNC: 1.26 MG/DL — SIGNIFICANT CHANGE UP (ref 0.5–1.3)
EOSINOPHIL # BLD AUTO: 0 K/UL — SIGNIFICANT CHANGE UP (ref 0–0.5)
EOSINOPHIL NFR BLD AUTO: 0 % — SIGNIFICANT CHANGE UP (ref 0–6)
GLUCOSE SERPL-MCNC: 98 MG/DL — SIGNIFICANT CHANGE UP (ref 70–99)
HCT VFR BLD CALC: 32.6 % — LOW (ref 39–50)
HGB BLD-MCNC: 10.2 G/DL — LOW (ref 13–17)
IMM GRANULOCYTES NFR BLD AUTO: 0.7 % — SIGNIFICANT CHANGE UP (ref 0–1.5)
LYMPHOCYTES # BLD AUTO: 0.73 K/UL — LOW (ref 1–3.3)
LYMPHOCYTES # BLD AUTO: 4.5 % — LOW (ref 13–44)
MCHC RBC-ENTMCNC: 28.7 PG — SIGNIFICANT CHANGE UP (ref 27–34)
MCHC RBC-ENTMCNC: 31.3 GM/DL — LOW (ref 32–36)
MCV RBC AUTO: 91.6 FL — SIGNIFICANT CHANGE UP (ref 80–100)
MONOCYTES # BLD AUTO: 1.25 K/UL — HIGH (ref 0–0.9)
MONOCYTES NFR BLD AUTO: 7.6 % — SIGNIFICANT CHANGE UP (ref 2–14)
NEUTROPHILS # BLD AUTO: 14.25 K/UL — HIGH (ref 1.8–7.4)
NEUTROPHILS NFR BLD AUTO: 87.1 % — HIGH (ref 43–77)
NRBC # BLD: 0 /100 WBCS — SIGNIFICANT CHANGE UP (ref 0–0)
PLATELET # BLD AUTO: 371 K/UL — SIGNIFICANT CHANGE UP (ref 150–400)
POTASSIUM SERPL-MCNC: 4.4 MMOL/L — SIGNIFICANT CHANGE UP (ref 3.5–5.3)
POTASSIUM SERPL-SCNC: 4.4 MMOL/L — SIGNIFICANT CHANGE UP (ref 3.5–5.3)
RBC # BLD: 3.56 M/UL — LOW (ref 4.2–5.8)
RBC # FLD: 13 % — SIGNIFICANT CHANGE UP (ref 10.3–14.5)
SODIUM SERPL-SCNC: 138 MMOL/L — SIGNIFICANT CHANGE UP (ref 135–145)
WBC # BLD: 16.37 K/UL — HIGH (ref 3.8–10.5)
WBC # FLD AUTO: 16.37 K/UL — HIGH (ref 3.8–10.5)

## 2021-02-16 PROCEDURE — 99233 SBSQ HOSP IP/OBS HIGH 50: CPT

## 2021-02-16 PROCEDURE — 72125 CT NECK SPINE W/O DYE: CPT | Mod: 26

## 2021-02-16 RX ORDER — ACETAMINOPHEN 500 MG
1000 TABLET ORAL ONCE
Refills: 0 | Status: COMPLETED | OUTPATIENT
Start: 2021-02-16 | End: 2021-02-16

## 2021-02-16 RX ORDER — OXYCODONE HYDROCHLORIDE 5 MG/1
10 TABLET ORAL EVERY 4 HOURS
Refills: 0 | Status: DISCONTINUED | OUTPATIENT
Start: 2021-02-16 | End: 2021-02-19

## 2021-02-16 RX ORDER — CYCLOBENZAPRINE HYDROCHLORIDE 10 MG/1
10 TABLET, FILM COATED ORAL THREE TIMES A DAY
Refills: 0 | Status: DISCONTINUED | OUTPATIENT
Start: 2021-02-16 | End: 2021-02-19

## 2021-02-16 RX ORDER — OXYCODONE HYDROCHLORIDE 5 MG/1
5 TABLET ORAL EVERY 4 HOURS
Refills: 0 | Status: DISCONTINUED | OUTPATIENT
Start: 2021-02-16 | End: 2021-02-19

## 2021-02-16 RX ORDER — ENOXAPARIN SODIUM 100 MG/ML
40 INJECTION SUBCUTANEOUS
Refills: 0 | Status: DISCONTINUED | OUTPATIENT
Start: 2021-02-16 | End: 2021-02-18

## 2021-02-16 RX ADMIN — OXYCODONE HYDROCHLORIDE 5 MILLIGRAM(S): 5 TABLET ORAL at 17:09

## 2021-02-16 RX ADMIN — TAMSULOSIN HYDROCHLORIDE 0.4 MILLIGRAM(S): 0.4 CAPSULE ORAL at 11:11

## 2021-02-16 RX ADMIN — Medication 1 TABLET(S): at 11:11

## 2021-02-16 RX ADMIN — Medication 400 MILLIGRAM(S): at 19:34

## 2021-02-16 RX ADMIN — Medication 100 MILLIGRAM(S): at 08:40

## 2021-02-16 RX ADMIN — HYDROMORPHONE HYDROCHLORIDE 30 MILLILITER(S): 2 INJECTION INTRAMUSCULAR; INTRAVENOUS; SUBCUTANEOUS at 07:16

## 2021-02-16 RX ADMIN — Medication 100 MILLIGRAM(S): at 01:39

## 2021-02-16 RX ADMIN — DEXTROSE MONOHYDRATE, SODIUM CHLORIDE, AND POTASSIUM CHLORIDE 75 MILLILITER(S): 50; .745; 4.5 INJECTION, SOLUTION INTRAVENOUS at 08:40

## 2021-02-16 RX ADMIN — ENOXAPARIN SODIUM 40 MILLIGRAM(S): 100 INJECTION SUBCUTANEOUS at 17:09

## 2021-02-16 NOTE — PROGRESS NOTE ADULT - ASSESSMENT
75 M with h/o constipation presents with mechanical fall found to have an impacted angulated C2 fracture extending into lateral masses involving vascular foramen bilaterally. Incidentally found to have a distended bladder and marked L hydroureteronephrosis S/P dutton placement .  Patient was found to have  type 2 dens fracture and left pelvic fracture resulted from the fall  with plan for OR On 2/15.  Medicine was called back on 2/13 due to increase in Creatinine unclear etiology with bladder scan 115, FeNa 1.37%.      75 M with h/o constipation presents with mechanical fall found to have an impacted angulated C2 fracture extending into lateral masses involving vascular foramen bilaterally. Incidentally found to have a distended bladder and marked L hydroureteronephrosis S/P dutton placement .  Patient was found to have  type 2 dens fracture and left pelvic fracture resulted from the fall  with plan for OR On 2/15.  Medicine was called back on 2/13 due to increase in Creatinine unclear etiology with bladder scan 115, FeNa 1.37%. s/p OCC-C4 POSTERIOR ARTHRODESIS 2/15 c 200 cc EBL.

## 2021-02-16 NOTE — PROGRESS NOTE ADULT - PROBLEM SELECTOR PLAN 7
- Please closely monitor H/H   - would send anemia profile in AM labs ( Fe , Ferritin, TIBC , VIt B12 and folate level)   - Transfuse for hem <7.0 or if symptomatic or actively bleeding dvt ppx per neurosurg team

## 2021-02-16 NOTE — PROGRESS NOTE ADULT - PROBLEM SELECTOR PLAN 6
BP is stable Fine resting tremor Lt >RT upper extremity  which is not new as per patient   outpt neuro eval for Parkinsons d/w pt

## 2021-02-16 NOTE — PROGRESS NOTE ADULT - ASSESSMENT
HPI: 75 M with no PMH presents s/p mechanical fall on Thursday when he slipped down stairs, landed on his back. He hit his head and had some bleeding however no loss of consciousness. Since then he has had difficulty ambulating. He has been having pain in the neck, chest wall, and L hip. Patient is not on any blood thinners. No fevers, chills, chest pain, shortness of breath. No n/v/diarrhea.    Of note, patient found to have L hydroureteronephrosis as well as distended bladder on imaging. Patient denies any difficulty urinating however has not urinated since ED admission. He denies any dysuria or hematuria however has been having increased urinary frequency.     (09 Feb 2021 03:45)    PROCEDURE:  - s/p OCC-C4 POSTERIOR ARTHRODESIS  POD# 1    PLAN:  - neuro and vital check Q4hrs   - continue C Collar at all times  - PCA d/c'ed today, pain control with tylenol and oxycodone prn, flexeril prn for muscle spasm  - ALEKSANDR (acute kidney injury) and urinary retention, Cr improved this am 1.26, continue dutton and flomax , will f/u repeat tomorrow, would try to avoid hypotensive episodes given ALEKSANDR. pt denies h/o CKD but reports seeing a urologist for an enlarged prostate. avoid NSAIDs  - Hydronephrosis of left kidney, renal US on 2/10 shows improved left hydro. will need outpt f/u.   - Encouraged incentive spirometry  - Ortho saw for left inferior and high superior pubic rami/ant column non displaced fractures. Recommend 1) No acute orthopedic surgical intervention indicated at this time.  2) WBAT LLE, assistive devices as needed  3) Pain control.  - Regular diet  - last BM on 2/14, continue current bowel regimens  - DVT ppx; venodyes and SQL  - PT- TBD    Will discuss above with Dr. Derrell moreno [phone]

## 2021-02-16 NOTE — PROGRESS NOTE ADULT - SUBJECTIVE AND OBJECTIVE BOX
Patient was seen at bedside this am. Patient denied any numbness/tingling, cp, sob, n/v, or abd pain.    OVERNIGHT EVENTS:  - s/p OCC-C4 POSTERIOR ARTHRODESIS yesterday    Vital Signs Last 24 Hrs  T(C): 36.7 (16 Feb 2021 12:17), Max: 36.8 (16 Feb 2021 01:43)  T(F): 98 (16 Feb 2021 12:17), Max: 98.2 (16 Feb 2021 01:43)  HR: 79 (16 Feb 2021 12:17) (77 - 85)  BP: 152/78 (16 Feb 2021 12:17) (139/88 - 165/84)  BP(mean): --  RR: 18 (16 Feb 2021 12:17) (16 - 18)  SpO2: 95% (16 Feb 2021 12:17) (95% - 100%)    I&O's Detail    15 Feb 2021 07:01  -  16 Feb 2021 07:00  --------------------------------------------------------  IN:    Oral Fluid: 510 mL    sodium chloride 0.9% w/ Additives: 1125 mL  Total IN: 1635 mL    OUT:    Accordian (mL): 70 mL    Indwelling Catheter - Urethral (mL): 1320 mL  Total OUT: 1390 mL    Total NET: 245 mL      16 Feb 2021 07:01  -  16 Feb 2021 16:10  --------------------------------------------------------  IN:    Oral Fluid: 240 mL  Total IN: 240 mL    OUT:    Accordian (mL): 60 mL    Indwelling Catheter - Urethral (mL): 800 mL  Total OUT: 860 mL    Total NET: -620 mL        I&O's Summary    15 Feb 2021 07:01  -  16 Feb 2021 07:00  --------------------------------------------------------  IN: 1635 mL / OUT: 1390 mL / NET: 245 mL    16 Feb 2021 07:01  -  16 Feb 2021 16:10  --------------------------------------------------------  IN: 240 mL / OUT: 860 mL / NET: -620 mL        PHYSICAL EXAM:  Neurological:  awake, alert, oriented to person, place, and date, PERRL, speech clear, following commands, LLE HF 4+/5, otherwise 5/5, sensation intact to light touch  Cardiovascular: +s1, s2  Respiratory: clear to auscultation b/l  Gastrointestinal: soft, non-distended, non-tender  Extremities: warm, dry, no calf tenderness noted  Incision/Wound: incision dressing C/D/I    LABS:                        10.2   16.37 )-----------( 371      ( 16 Feb 2021 06:16 )             32.6     02-16    138  |  106  |  22  ----------------------------<  98  4.4   |  22  |  1.26    Ca    8.6      16 Feb 2021 06:15              CAPILLARY BLOOD GLUCOSE          Drug Levels: [] N/A    CSF Analysis: [] N/A      Allergies    No Known Allergies    Intolerances      MEDICATIONS:  Antibiotics:    Neuro:  acetaminophen   Tablet .. 650 milliGRAM(s) Oral every 6 hours PRN  cyclobenzaprine 10 milliGRAM(s) Oral three times a day PRN  melatonin 3 milliGRAM(s) Oral at bedtime PRN  ondansetron Injectable 4 milliGRAM(s) IV Push every 6 hours PRN  oxyCODONE    IR 5 milliGRAM(s) Oral every 4 hours PRN  oxyCODONE    IR 10 milliGRAM(s) Oral every 4 hours PRN    Anticoagulation:  enoxaparin Injectable 40 milliGRAM(s) SubCutaneous <User Schedule>    OTHER:  mupirocin 2% Ointment 1 Application(s) Topical two times a day  naloxone Injectable 0.1 milliGRAM(s) IV Push every 3 minutes PRN  senna 2 Tablet(s) Oral at bedtime  tamsulosin 0.4 milliGRAM(s) Oral daily    IVF:  multivitamin 1 Tablet(s) Oral daily    CULTURES:  Culture Results:   No growth (02-09 @ 05:21)    RADIOLOGY & ADDITIONAL TESTS:

## 2021-02-16 NOTE — PROGRESS NOTE ADULT - SUBJECTIVE AND OBJECTIVE BOX
DATE OF SERVICE: 02-16-21 @ 06:57    Subjective: Patient seen and examined. No new events except as noted.     SUBJECTIVE/ROS:  s/p surgery       MEDICATIONS:  MEDICATIONS  (STANDING):  ceFAZolin   IVPB 2000 milliGRAM(s) IV Intermittent every 8 hours  enoxaparin Injectable 40 milliGRAM(s) SubCutaneous daily  HYDROmorphone PCA (1 mG/mL) 30 milliLiter(s) PCA Continuous PCA Continuous  multivitamin 1 Tablet(s) Oral daily  mupirocin 2% Ointment 1 Application(s) Topical two times a day  senna 2 Tablet(s) Oral at bedtime  sodium chloride 0.9% with potassium chloride 20 mEq/L 1000 milliLiter(s) (75 mL/Hr) IV Continuous <Continuous>  tamsulosin 0.4 milliGRAM(s) Oral daily      PHYSICAL EXAM:  T(C): 36.7 (02-16-21 @ 05:38), Max: 36.8 (02-16-21 @ 01:43)  HR: 79 (02-16-21 @ 05:38) (64 - 80)  BP: 165/84 (02-16-21 @ 05:38) (126/89 - 181/86)  RR: 18 (02-16-21 @ 05:38) (15 - 18)  SpO2: 97% (02-16-21 @ 05:38) (95% - 100%)  Wt(kg): --  I&O's Summary    14 Feb 2021 07:01  -  15 Feb 2021 07:00  --------------------------------------------------------  IN: 860 mL / OUT: 2450 mL / NET: -1590 mL    15 Feb 2021 07:01  -  16 Feb 2021 06:57  --------------------------------------------------------  IN: 635 mL / OUT: 1390 mL / NET: -755 mL      Height (cm): 185.4 (02-15 @ 08:11)  Weight (kg): 63.5 (02-15 @ 08:11)  BMI (kg/m2): 18.5 (02-15 @ 08:11)  BSA (m2): 1.85 (02-15 @ 08:11)      JVP: Normal  Neck: supple  Lung: clear   CV: S1 S2 , Murmur:  Abd: soft  Ext: No edema  neuro: Awake / alert  Psych: flat affect  Skin: normal``    LABS/DATA:    CARDIAC MARKERS:                                10.2   16.37 )-----------( 371      ( 16 Feb 2021 06:16 )             32.6     02-15    136  |  106  |  23  ----------------------------<  160<H>  4.0   |  19<L>  |  1.14    Ca    8.3<L>      15 Feb 2021 12:33      proBNP:   Lipid Profile:   HgA1c:   TSH:     TELE:  EKG:

## 2021-02-16 NOTE — PROGRESS NOTE ADULT - PROBLEM SELECTOR PLAN 2
originally improved with dutton with creat on 2/12 was 1.29 ( highest Cr was 1.55 on 2/10)   Creat today = 1.44 (stable from yesterday)   Please closely monitor urine output / BUN/CR   COuld most likely be sec to volume depletion , unlikely to be sec to Hydronephrosis as pt has dutton in place  FeNa 1.37% and no retention noted on bladder scan at bedside (115cc)  continue NS and repeat BMP in AM improved today  Please closely monitor urine output / BUN/CR   COuld most likely be sec to volume depletion , unlikely to be sec to Hydronephrosis as pt has dutton in place  FeNa 1.37% and no retention noted on bladder scan at bedside (115cc)

## 2021-02-16 NOTE — PROGRESS NOTE ADULT - PROBLEM SELECTOR PLAN 4
f/u renal US shows improved left hydro. will need outpt f/u f/u renal US shows improved left hydro. will need outpt f/u- d/w pt

## 2021-02-16 NOTE — PROGRESS NOTE ADULT - ASSESSMENT
Cervical spine fx  Dens fx  s/p fusion   fu with nsx   pain control     HTN  would not treat too aggressively now given high risk of orthostatic hypotension     CKD  as per med

## 2021-02-16 NOTE — PROGRESS NOTE ADULT - SUBJECTIVE AND OBJECTIVE BOX
Colton Coffey   Pager [phone]  Office [phone]      CC: Patient is a 76y old  Male who presents with a chief complaint of dens fracture (14 Feb 2021 16:37)      SUBJECTIVE / OVERNIGHT EVENTS:    MEDICATIONS  (STANDING):  enoxaparin Injectable 40 milliGRAM(s) SubCutaneous <User Schedule>  multivitamin 1 Tablet(s) Oral daily  mupirocin 2% Ointment 1 Application(s) Topical two times a day  senna 2 Tablet(s) Oral at bedtime  tamsulosin 0.4 milliGRAM(s) Oral daily    MEDICATIONS  (PRN):  acetaminophen   Tablet .. 650 milliGRAM(s) Oral every 6 hours PRN Temp greater or equal to 38C (100.4F), Mild Pain (1 - 3)  cyclobenzaprine 10 milliGRAM(s) Oral three times a day PRN Muscle Spasm  melatonin 3 milliGRAM(s) Oral at bedtime PRN Insomnia  naloxone Injectable 0.1 milliGRAM(s) IV Push every 3 minutes PRN For ANY of the following changes in patient status:  A. RR LESS THAN 10 breaths per minute, B. Oxygen saturation LESS THAN 90%, C. Sedation score of 6  ondansetron Injectable 4 milliGRAM(s) IV Push every 6 hours PRN Nausea and/or Vomiting  oxyCODONE    IR 5 milliGRAM(s) Oral every 4 hours PRN Moderate Pain (4 - 6)  oxyCODONE    IR 10 milliGRAM(s) Oral every 4 hours PRN Severe Pain (7 - 10)      Vital Signs Last 24 Hrs  T(C): 36.7 (16 Feb 2021 12:17), Max: 36.8 (16 Feb 2021 01:43)  T(F): 98 (16 Feb 2021 12:17), Max: 98.2 (16 Feb 2021 01:43)  HR: 79 (16 Feb 2021 12:17) (69 - 85)  BP: 152/78 (16 Feb 2021 12:17) (139/88 - 177/91)  BP(mean): 126 (15 Feb 2021 15:00) (119 - 126)  RR: 18 (16 Feb 2021 12:17) (15 - 18)  SpO2: 95% (16 Feb 2021 12:17) (95% - 100%)  CAPILLARY BLOOD GLUCOSE        I&O's Summary    15 Feb 2021 07:01  -  16 Feb 2021 07:00  --------------------------------------------------------  IN: 1635 mL / OUT: 1390 mL / NET: 245 mL    16 Feb 2021 07:01  -  16 Feb 2021 12:29  --------------------------------------------------------  IN: 120 mL / OUT: 0 mL / NET: 120 mL      tele:    PHYSICAL EXAM:    GENERAL: NAD   HEENT: EOMI, PERRL  PULM: Clear to auscultation bilaterally  CV: Regular rate and rhythm; nl S1, S2; No murmurs, rubs, or gallops  ABDOMEN: Soft, Nontender, Nondistended; Bowel sounds present  EXTREMITIES/MSK:  No edema, calf tenderness   PSYCH: AAOx3  NEUROLOGY: non-focal          LABS:                        10.2   16.37 )-----------( 371      ( 16 Feb 2021 06:16 )             32.6     02-16    138  |  106  |  22  ----------------------------<  98  4.4   |  22  |  1.26    Ca    8.6      16 Feb 2021 06:15                  RADIOLOGY & ADDITIONAL TESTS:    Imaging Personally Reviewed:    Consultant(s) Notes Reviewed:      Care Discussed with Consultants/Other Providers:   Colton Coffey   Pager [phone]  Office [phone]      CC: Patient is a 76y old  Male who presents with a chief complaint of dens fracture (14 Feb 2021 16:37)      SUBJECTIVE / OVERNIGHT EVENTS: post-op pain controlled. no f/c/r. no cp/sob    MEDICATIONS  (STANDING):  enoxaparin Injectable 40 milliGRAM(s) SubCutaneous <User Schedule>  multivitamin 1 Tablet(s) Oral daily  mupirocin 2% Ointment 1 Application(s) Topical two times a day  senna 2 Tablet(s) Oral at bedtime  tamsulosin 0.4 milliGRAM(s) Oral daily    MEDICATIONS  (PRN):  acetaminophen   Tablet .. 650 milliGRAM(s) Oral every 6 hours PRN Temp greater or equal to 38C (100.4F), Mild Pain (1 - 3)  cyclobenzaprine 10 milliGRAM(s) Oral three times a day PRN Muscle Spasm  melatonin 3 milliGRAM(s) Oral at bedtime PRN Insomnia  naloxone Injectable 0.1 milliGRAM(s) IV Push every 3 minutes PRN For ANY of the following changes in patient status:  A. RR LESS THAN 10 breaths per minute, B. Oxygen saturation LESS THAN 90%, C. Sedation score of 6  ondansetron Injectable 4 milliGRAM(s) IV Push every 6 hours PRN Nausea and/or Vomiting  oxyCODONE    IR 5 milliGRAM(s) Oral every 4 hours PRN Moderate Pain (4 - 6)  oxyCODONE    IR 10 milliGRAM(s) Oral every 4 hours PRN Severe Pain (7 - 10)      Vital Signs Last 24 Hrs  T(C): 36.7 (16 Feb 2021 12:17), Max: 36.8 (16 Feb 2021 01:43)  T(F): 98 (16 Feb 2021 12:17), Max: 98.2 (16 Feb 2021 01:43)  HR: 79 (16 Feb 2021 12:17) (69 - 85)  BP: 152/78 (16 Feb 2021 12:17) (139/88 - 177/91)  BP(mean): 126 (15 Feb 2021 15:00) (119 - 126)  RR: 18 (16 Feb 2021 12:17) (15 - 18)  SpO2: 95% (16 Feb 2021 12:17) (95% - 100%)  CAPILLARY BLOOD GLUCOSE        I&O's Summary    15 Feb 2021 07:01  -  16 Feb 2021 07:00  --------------------------------------------------------  IN: 1635 mL / OUT: 1390 mL / NET: 245 mL    16 Feb 2021 07:01  -  16 Feb 2021 12:29  --------------------------------------------------------  IN: 120 mL / OUT: 0 mL / NET: 120 mL          PHYSICAL EXAM:    GENERAL: NAD   HEENT: EOMI, PERRL  PULM: Clear to auscultation bilaterally  CV: Regular rate and rhythm; nl S1, S2; No murmurs, rubs, or gallops  ABDOMEN: Soft, Nontender, Nondistended; Bowel sounds present  EXTREMITIES/MSK:  No edema, calf tenderness   PSYCH: AAOx3  NEUROLOGY: non-focal          LABS:                        10.2   16.37 )-----------( 371      ( 16 Feb 2021 06:16 )             32.6     02-16    138  |  106  |  22  ----------------------------<  98  4.4   |  22  |  1.26    Ca    8.6      16 Feb 2021 06:15                  RADIOLOGY & ADDITIONAL TESTS:    Imaging Personally Reviewed:    Consultant(s) Notes Reviewed:      Care Discussed with Consultants/Other Providers: neurosurg

## 2021-02-16 NOTE — PROGRESS NOTE ADULT - PROBLEM SELECTOR PLAN 8
Fine resting tremor Lt >RT upper extremity  which is not new as per patient   MIght need eval by Neuro which could be as outpt considering that patient is admitted with fall   once able , would recommend PT eval Fine resting tremor Lt >RT upper extremity  which is not new as per patient   outpt neuro eval for Parkinsons d/w pt

## 2021-02-17 LAB
ANION GAP SERPL CALC-SCNC: 11 MMOL/L — SIGNIFICANT CHANGE UP (ref 5–17)
BASOPHILS # BLD AUTO: 0.05 K/UL — SIGNIFICANT CHANGE UP (ref 0–0.2)
BASOPHILS NFR BLD AUTO: 0.4 % — SIGNIFICANT CHANGE UP (ref 0–2)
BUN SERPL-MCNC: 20 MG/DL — SIGNIFICANT CHANGE UP (ref 7–23)
CALCIUM SERPL-MCNC: 8.4 MG/DL — SIGNIFICANT CHANGE UP (ref 8.4–10.5)
CHLORIDE SERPL-SCNC: 103 MMOL/L — SIGNIFICANT CHANGE UP (ref 96–108)
CO2 SERPL-SCNC: 25 MMOL/L — SIGNIFICANT CHANGE UP (ref 22–31)
CREAT SERPL-MCNC: 1.27 MG/DL — SIGNIFICANT CHANGE UP (ref 0.5–1.3)
EOSINOPHIL # BLD AUTO: 0.15 K/UL — SIGNIFICANT CHANGE UP (ref 0–0.5)
EOSINOPHIL NFR BLD AUTO: 1.2 % — SIGNIFICANT CHANGE UP (ref 0–6)
GLUCOSE SERPL-MCNC: 84 MG/DL — SIGNIFICANT CHANGE UP (ref 70–99)
HCT VFR BLD CALC: 33.1 % — LOW (ref 39–50)
HGB BLD-MCNC: 10.3 G/DL — LOW (ref 13–17)
IMM GRANULOCYTES NFR BLD AUTO: 0.7 % — SIGNIFICANT CHANGE UP (ref 0–1.5)
LYMPHOCYTES # BLD AUTO: 1.11 K/UL — SIGNIFICANT CHANGE UP (ref 1–3.3)
LYMPHOCYTES # BLD AUTO: 9.1 % — LOW (ref 13–44)
MCHC RBC-ENTMCNC: 28.6 PG — SIGNIFICANT CHANGE UP (ref 27–34)
MCHC RBC-ENTMCNC: 31.1 GM/DL — LOW (ref 32–36)
MCV RBC AUTO: 91.9 FL — SIGNIFICANT CHANGE UP (ref 80–100)
MONOCYTES # BLD AUTO: 1.11 K/UL — HIGH (ref 0–0.9)
MONOCYTES NFR BLD AUTO: 9.1 % — SIGNIFICANT CHANGE UP (ref 2–14)
NEUTROPHILS # BLD AUTO: 9.64 K/UL — HIGH (ref 1.8–7.4)
NEUTROPHILS NFR BLD AUTO: 79.5 % — HIGH (ref 43–77)
NRBC # BLD: 0 /100 WBCS — SIGNIFICANT CHANGE UP (ref 0–0)
PLATELET # BLD AUTO: 401 K/UL — HIGH (ref 150–400)
POTASSIUM SERPL-MCNC: 4 MMOL/L — SIGNIFICANT CHANGE UP (ref 3.5–5.3)
POTASSIUM SERPL-SCNC: 4 MMOL/L — SIGNIFICANT CHANGE UP (ref 3.5–5.3)
RBC # BLD: 3.6 M/UL — LOW (ref 4.2–5.8)
RBC # FLD: 13.3 % — SIGNIFICANT CHANGE UP (ref 10.3–14.5)
SODIUM SERPL-SCNC: 139 MMOL/L — SIGNIFICANT CHANGE UP (ref 135–145)
WBC # BLD: 12.14 K/UL — HIGH (ref 3.8–10.5)
WBC # FLD AUTO: 12.14 K/UL — HIGH (ref 3.8–10.5)

## 2021-02-17 PROCEDURE — 99232 SBSQ HOSP IP/OBS MODERATE 35: CPT

## 2021-02-17 PROCEDURE — 99222 1ST HOSP IP/OBS MODERATE 55: CPT

## 2021-02-17 RX ORDER — ACETAMINOPHEN 500 MG
1000 TABLET ORAL ONCE
Refills: 0 | Status: COMPLETED | OUTPATIENT
Start: 2021-02-17 | End: 2021-02-17

## 2021-02-17 RX ADMIN — OXYCODONE HYDROCHLORIDE 5 MILLIGRAM(S): 5 TABLET ORAL at 01:44

## 2021-02-17 RX ADMIN — OXYCODONE HYDROCHLORIDE 5 MILLIGRAM(S): 5 TABLET ORAL at 21:31

## 2021-02-17 RX ADMIN — OXYCODONE HYDROCHLORIDE 5 MILLIGRAM(S): 5 TABLET ORAL at 10:16

## 2021-02-17 RX ADMIN — SENNA PLUS 2 TABLET(S): 8.6 TABLET ORAL at 21:32

## 2021-02-17 RX ADMIN — TAMSULOSIN HYDROCHLORIDE 0.4 MILLIGRAM(S): 0.4 CAPSULE ORAL at 12:50

## 2021-02-17 RX ADMIN — Medication 1 TABLET(S): at 12:50

## 2021-02-17 RX ADMIN — CYCLOBENZAPRINE HYDROCHLORIDE 10 MILLIGRAM(S): 10 TABLET, FILM COATED ORAL at 21:32

## 2021-02-17 RX ADMIN — OXYCODONE HYDROCHLORIDE 5 MILLIGRAM(S): 5 TABLET ORAL at 15:47

## 2021-02-17 RX ADMIN — Medication 400 MILLIGRAM(S): at 04:30

## 2021-02-17 RX ADMIN — ENOXAPARIN SODIUM 40 MILLIGRAM(S): 100 INJECTION SUBCUTANEOUS at 18:00

## 2021-02-17 NOTE — PROGRESS NOTE ADULT - REASON FOR ADMISSION
Patient was admitted after a fall with type 2 dens fracture and left pelvic fracture
Patient was admitted after a fall with type 2 dens fracture and left pelvic fracture
dens fracture s/p fall
dens fx s/p fall
fall with type 2 dens fracture and left pelvic fracture
dens fx s/p fall
dens fracture
Patient was admitted after a fall with type 2 dens fracture and left pelvic fracture

## 2021-02-17 NOTE — PROGRESS NOTE ADULT - SUBJECTIVE AND OBJECTIVE BOX
Patient was seen at bedside this am. Patient was upset as his food came late. Otherwise, denied any neck pain, numbness/tingling, sob, cp, n/v, or abd pain.    OVERNIGHT EVENTS: no acute event overnight    Vital Signs Last 24 Hrs  T(C): 36.6 (17 Feb 2021 08:25), Max: 36.7 (16 Feb 2021 12:17)  T(F): 97.9 (17 Feb 2021 08:25), Max: 98 (16 Feb 2021 12:17)  HR: 82 (17 Feb 2021 08:25) (75 - 83)  BP: 133/81 (17 Feb 2021 08:25) (132/74 - 152/78)  BP(mean): --  RR: 18 (17 Feb 2021 08:25) (18 - 18)  SpO2: 98% (17 Feb 2021 08:25) (94% - 99%)    I&O's Detail    16 Feb 2021 07:01  -  17 Feb 2021 07:00  --------------------------------------------------------  IN:    Oral Fluid: 560 mL  Total IN: 560 mL    OUT:    Accordian (mL): 145 mL    Indwelling Catheter - Urethral (mL): 2650 mL  Total OUT: 2795 mL    Total NET: -2235 mL        I&O's Summary    16 Feb 2021 07:01  -  17 Feb 2021 07:00  --------------------------------------------------------  IN: 560 mL / OUT: 2795 mL / NET: -2235 mL        PHYSICAL EXAM:  Neurological:  awake, alert, oriented to person, place, and date, PERRL, speech clear, following commands, LLE HF 4+/5, otherwise 5/5, sensation intact to light touch  Cardiovascular: +s1, s2  Respiratory: clear to auscultation b/l  Gastrointestinal: soft, non-distended, non-tender  Extremities: warm, dry, no calf tenderness noted  Incision/Wound: incision dressing C/D/I, HMV output for past 24 hours- 120ml      LABS:                        10.3   12.14 )-----------( 401      ( 17 Feb 2021 09:11 )             33.1     02-17    139  |  103  |  20  ----------------------------<  84  4.0   |  25  |  1.27    Ca    8.4      17 Feb 2021 09:11              CAPILLARY BLOOD GLUCOSE          Drug Levels: [] N/A    CSF Analysis: [] N/A      Allergies    No Known Allergies    Intolerances      MEDICATIONS:  Antibiotics:    Neuro:  acetaminophen   Tablet .. 650 milliGRAM(s) Oral every 6 hours PRN  cyclobenzaprine 10 milliGRAM(s) Oral three times a day PRN  melatonin 3 milliGRAM(s) Oral at bedtime PRN  ondansetron Injectable 4 milliGRAM(s) IV Push every 6 hours PRN  oxyCODONE    IR 5 milliGRAM(s) Oral every 4 hours PRN  oxyCODONE    IR 10 milliGRAM(s) Oral every 4 hours PRN    Anticoagulation:  enoxaparin Injectable 40 milliGRAM(s) SubCutaneous <User Schedule>    OTHER:  naloxone Injectable 0.1 milliGRAM(s) IV Push every 3 minutes PRN  senna 2 Tablet(s) Oral at bedtime  tamsulosin 0.4 milliGRAM(s) Oral daily    IVF:  multivitamin 1 Tablet(s) Oral daily    CULTURES:  Culture Results:   No growth (02-09 @ 05:21)    RADIOLOGY & ADDITIONAL TESTS:

## 2021-02-17 NOTE — PROGRESS NOTE ADULT - ASSESSMENT
HPI: 75 M with no PMH presents s/p mechanical fall on Thursday when he slipped down stairs, landed on his back. He hit his head and had some bleeding however no loss of consciousness. Since then he has had difficulty ambulating. He has been having pain in the neck, chest wall, and L hip. Patient is not on any blood thinners. No fevers, chills, chest pain, shortness of breath. No n/v/diarrhea.    Of note, patient found to have L hydroureteronephrosis as well as distended bladder on imaging. Patient denies any difficulty urinating however has not urinated since ED admission. He denies any dysuria or hematuria however has been having increased urinary frequency.     (09 Feb 2021 03:45)    PROCEDURE:  - s/p OCC-C4 POSTERIOR ARTHRODESIS  POD# 2    PLAN:  - neuro and vital check Q4hrs   - continue C Collar at all times  - continue to monitor drain output  - pain control with tylenol and oxycodone prn, flexeril prn for muscle spasm  - ALEKSANDR (acute kidney injury) and urinary retention, Cr stable this am 1.27, continue dutton and flomax, will consider d/c dutton once patient ambulates more  - Hydronephrosis of left kidney, renal US on 2/10 shows improved left hydro. will need outpt f/u.   - Encouraged incentive spirometry  - Ortho saw for left inferior and high superior pubic rami/ant column non displaced fractures. Recommend 1) No acute orthopedic surgical intervention indicated at this time.  2) WBAT LLE, assistive devices as needed  3) Pain control.  - Regular diet  - last BM on 2/16, continue current bowel regimens  - DVT ppx; venodyes and SQL  - PT- TBD    Will discuss above with Dr. Derrell moreno [phone]

## 2021-02-17 NOTE — CONSULT NOTE ADULT - CONSULT REASON
Rehabilitation consult
distention of bladder
L Inf IL Fx, L Ant column Nd Fx
Pre-Operative Cardiac Risk Stratification and Optimization
dens fx
urinary retention and left hydronephrosis

## 2021-02-17 NOTE — CONSULT NOTE ADULT - SUBJECTIVE AND OBJECTIVE BOX
Patient is a 75y old  Male who presents with a chief complaint of fall     HPI: 75 M with no PMH presents s/p mechanical fall on Thursday when he slipped down stairs, landed on his back. He hit his head and had some bleeding however no loss of consciousness. Since then he has had difficulty ambulating. He has been having pain in the neck, chest wall, and L hip. Patient is not on any blood thinners. No fevers, chills, chest pain, shortness of breath. No n/v/diarrhea.    Of note, patient found to have L hydroureteronephrosis as well as distended bladder on imaging. Patient denies any difficulty urinating however has not urinated since ED admission. He denies any dysuria or hematuria however has been having increased urinary frequency.     (09 Feb 2021 03:45)    Patient was admitted on 2/9, found to have type 2-3 dens fracture, left pelvis fracture, Urology consulted for retention, hydronephrosis, dutton placed, ALEKSANDR improved. Ortho consulted, no surgical intervention for non displaced pelvic fractures, WBAT. Patient went to OR on 2/15, for Occ-C4 posterior arthrodesis. Seen today, reports neck pain, denies weakness. Patient states he fell recently at home, lost his balance putting pants on. Fell down stairs after catching his foot on his boots, which he left on the steps to dry.      REVIEW OF SYSTEMS  Constitutional - No fever, No weight loss, No fatigue  HEENT - No eye pain, No visual disturbances, No difficulty hearing, No tinnitus, No vertigo, + neck pain  Respiratory - No cough, No wheezing, No shortness of breath  Cardiovascular - No chest pain, No palpitations  Gastrointestinal - No abdominal pain, No nausea, No vomiting, No diarrhea, No constipation  Genitourinary - No dysuria, No frequency, No hematuria, No incontinence  Neurological - No headaches, No memory loss, No loss of strength, No numbness, No tremors  Musculoskeletal - No joint pain, No joint swelling, No muscle pain  Psychiatric - No depression, No anxiety    VITALS  T(C): 36.6 (02-17-21 @ 12:41), Max: 36.7 (02-16-21 @ 16:12)  HR: 105 (02-17-21 @ 12:41) (75 - 105)  BP: 126/82 (02-17-21 @ 12:41) (126/82 - 146/81)  RR: 18 (02-17-21 @ 12:41) (18 - 18)  SpO2: 99% (02-17-21 @ 12:41) (94% - 99%)  Wt(kg): --    PAST MEDICAL & SURGICAL HISTORY  no PMHx     SOCIAL HISTORY  Smoking - Denied  EtOH - Denied   Drugs - Denied    FUNCTIONAL HISTORY  Lives alone, private house, stairs to bedroom  Independent AMB and ADLs PTA     CURRENT FUNCTIONAL STATUS  2/16 PT  bed mobility min assist  transfers min assist, RW  gait min assist x 8 feet with RW        FAMILY HISTORY   no pertinent history in primary relatives     RECENT LABS/IMAGING  CBC Full  -  ( 17 Feb 2021 09:11 )  WBC Count : 12.14 K/uL  RBC Count : 3.60 M/uL  Hemoglobin : 10.3 g/dL  Hematocrit : 33.1 %  Platelet Count - Automated : 401 K/uL  Mean Cell Volume : 91.9 fl  Mean Cell Hemoglobin : 28.6 pg  Mean Cell Hemoglobin Concentration : 31.1 gm/dL  Auto Neutrophil # : 9.64 K/uL  Auto Lymphocyte # : 1.11 K/uL  Auto Monocyte # : 1.11 K/uL  Auto Eosinophil # : 0.15 K/uL  Auto Basophil # : 0.05 K/uL  Auto Neutrophil % : 79.5 %  Auto Lymphocyte % : 9.1 %  Auto Monocyte % : 9.1 %  Auto Eosinophil % : 1.2 %  Auto Basophil % : 0.4 %    02-17    139  |  103  |  20  ----------------------------<  84  4.0   |  25  |  1.27    Ca    8.4      17 Feb 2021 09:11      < from: MR Cervical Spine No Cont (02.09.21 @ 00:04) >    IMPRESSION: Acute impacted type II-III dens fracture without the placement of fracture fragments. No canal compression or intraspinal hemorrhage. No cord compression. Unremarkable thoracic and lumbar spine.    Incidental enlarged bladder, right-sidedhydronephrosis and left-sided hydroureteronephrosis.      < end of copied text >      < from: CT Abdomen and Pelvis w/ IV Cont (02.08.21 @ 21:20) >    IMPRESSION:    1. No evidence of solid organ injury.    2. Acute mildly displaced fracture of the left inferior pubic ramus. Acute minimally displaced fractures of the left superior pubic ramus and left acetabulum.    3. Marked bladder distention, possibly related to an enlarged prostate. Correlation for urinary retention is recommended. Correlation with PSA is also recommended for the prostate.    4. Severe left hydroureteronephrosis, likely chronic given associated left renal cortical thinning. Follow-up CT urogram can be considered for further evaluation.    < end of copied text >      ALLERGIES  No Known Allergies      MEDICATIONS   acetaminophen   Tablet .. 650 milliGRAM(s) Oral every 6 hours PRN  cyclobenzaprine 10 milliGRAM(s) Oral three times a day PRN  enoxaparin Injectable 40 milliGRAM(s) SubCutaneous <User Schedule>  melatonin 3 milliGRAM(s) Oral at bedtime PRN  multivitamin 1 Tablet(s) Oral daily  naloxone Injectable 0.1 milliGRAM(s) IV Push every 3 minutes PRN  ondansetron Injectable 4 milliGRAM(s) IV Push every 6 hours PRN  oxyCODONE    IR 5 milliGRAM(s) Oral every 4 hours PRN  oxyCODONE    IR 10 milliGRAM(s) Oral every 4 hours PRN  senna 2 Tablet(s) Oral at bedtime  tamsulosin 0.4 milliGRAM(s) Oral daily      ----------------------------------------------------------------------------------------  PHYSICAL EXAM  Constitutional - NAD, Comfortable, in recliner   +cervical collar, +drain   Chest - Breathing comfortably, No wheezing  Cardiovascular - S1S2   Abdomen - Soft   Extremities - No C/C/E, No calf tenderness   Neurologic Exam -                    Cognitive - Awake, Alert, AAO to self, place, date, year, situation     Communication - Fluent, No dysarthria        Motor - moves all ext, 5/5       Sensory - Intact to LT     Psychiatric - Mood stable, Affect WNL  ----------------------------------------------------------------------------------------  ASSESSMENT/PLAN  76 year old man with functional deficits after fall down stairs at home, s/p Occ-C4 posterior arthrodesis for dens fracture   left inferior and high superior pubic rami/ant column non displaced fractures, WBAT LLE   ALEKSANDR, urinary retention, hydronephrosis improved, flomax, dutton  Pain - Tylenol, oxycodone, flexeril prn  DVT PPX - SCDs, lovenox   Rehab - Will continue to follow for ongoing rehab needs and recommendations.   OT eval ordered, PT: min assist with transfers gait  continue bedside therapy, out of bed to chair daily    Recommend ACUTE inpatient rehabilitation for the functional deficits consisting of 3 hours of therapy/day & 24 hour RN/daily PMR physician for comorbid medical management. Patient will be able to tolerate 3 hours a day.

## 2021-02-17 NOTE — CONSULT NOTE ADULT - CONSULT REQUESTED DATE/TIME
09-Feb-2021 02:55
10-Feb-2021 15:20
08-Feb-2021 20:49
09-Feb-2021 00:53
09-Feb-2021 14:26
17-Feb-2021 15:31

## 2021-02-17 NOTE — PROGRESS NOTE ADULT - ASSESSMENT
75 M with h/o constipation presents with mechanical fall found to have an impacted angulated C2 fracture extending into lateral masses involving vascular foramen bilaterally. Incidentally found to have a distended bladder and marked L hydroureteronephrosis S/P dutton placement .  Patient was found to have  type 2 dens fracture and left pelvic fracture resulted from the fall  with plan for OR On 2/15.  Medicine was called back on 2/13 due to increase in Creatinine unclear etiology with bladder scan 115, FeNa 1.37%. s/p OCC-C4 POSTERIOR ARTHRODESIS 2/15 c 200 cc EBL.

## 2021-02-17 NOTE — PROGRESS NOTE ADULT - PROBLEM SELECTOR PLAN 6
Fine resting tremor Lt >RT upper extremity  which is not new as per patient   outpt neuro eval for Parkinsons d/w pt

## 2021-02-17 NOTE — PROGRESS NOTE ADULT - SUBJECTIVE AND OBJECTIVE BOX
DATE OF SERVICE: 02-17-21 @ 07:46    Subjective: Patient seen and examined. No new events except as noted.     SUBJECTIVE/ROS:    NAD    MEDICATIONS:  MEDICATIONS  (STANDING):  enoxaparin Injectable 40 milliGRAM(s) SubCutaneous <User Schedule>  multivitamin 1 Tablet(s) Oral daily  senna 2 Tablet(s) Oral at bedtime  tamsulosin 0.4 milliGRAM(s) Oral daily      PHYSICAL EXAM:  T(C): 36.4 (02-17-21 @ 06:19), Max: 36.7 (02-16-21 @ 12:17)  HR: 81 (02-17-21 @ 06:19) (75 - 85)  BP: 146/81 (02-17-21 @ 06:19) (132/74 - 160/84)  RR: 18 (02-17-21 @ 06:19) (18 - 18)  SpO2: 99% (02-17-21 @ 06:19) (94% - 99%)  Wt(kg): --  I&O's Summary    16 Feb 2021 07:01  -  17 Feb 2021 07:00  --------------------------------------------------------  IN: 560 mL / OUT: 2795 mL / NET: -2235 mL            JVP: Normal  Neck: supple  Lung: clear   CV: S1 S2 , Murmur:  Abd: soft  Ext: No edema  neuro: Awake / alert  Psych: flat affect  Skin: normal``    LABS/DATA:    CARDIAC MARKERS:                                10.2   16.37 )-----------( 371      ( 16 Feb 2021 06:16 )             32.6     02-16    138  |  106  |  22  ----------------------------<  98  4.4   |  22  |  1.26    Ca    8.6      16 Feb 2021 06:15      proBNP:   Lipid Profile:   HgA1c:   TSH:     TELE:  EKG:

## 2021-02-17 NOTE — PROGRESS NOTE ADULT - SUBJECTIVE AND OBJECTIVE BOX
Colton Coffey   Pager [phone]  Office [phone]      CC: Patient is a 76y old  Male who presents with a chief complaint of dens fx s/p fall (16 Feb 2021 16:09)      SUBJECTIVE / OVERNIGHT EVENTS:    MEDICATIONS  (STANDING):  enoxaparin Injectable 40 milliGRAM(s) SubCutaneous <User Schedule>  multivitamin 1 Tablet(s) Oral daily  senna 2 Tablet(s) Oral at bedtime  tamsulosin 0.4 milliGRAM(s) Oral daily    MEDICATIONS  (PRN):  acetaminophen   Tablet .. 650 milliGRAM(s) Oral every 6 hours PRN Temp greater or equal to 38C (100.4F), Mild Pain (1 - 3)  cyclobenzaprine 10 milliGRAM(s) Oral three times a day PRN Muscle Spasm  melatonin 3 milliGRAM(s) Oral at bedtime PRN Insomnia  naloxone Injectable 0.1 milliGRAM(s) IV Push every 3 minutes PRN For ANY of the following changes in patient status:  A. RR LESS THAN 10 breaths per minute, B. Oxygen saturation LESS THAN 90%, C. Sedation score of 6  ondansetron Injectable 4 milliGRAM(s) IV Push every 6 hours PRN Nausea and/or Vomiting  oxyCODONE    IR 5 milliGRAM(s) Oral every 4 hours PRN Moderate Pain (4 - 6)  oxyCODONE    IR 10 milliGRAM(s) Oral every 4 hours PRN Severe Pain (7 - 10)      Vital Signs Last 24 Hrs  T(C): 36.6 (17 Feb 2021 08:25), Max: 36.7 (16 Feb 2021 12:17)  T(F): 97.9 (17 Feb 2021 08:25), Max: 98 (16 Feb 2021 12:17)  HR: 82 (17 Feb 2021 08:25) (75 - 83)  BP: 133/81 (17 Feb 2021 08:25) (132/74 - 152/78)  BP(mean): --  RR: 18 (17 Feb 2021 08:25) (18 - 18)  SpO2: 98% (17 Feb 2021 08:25) (94% - 99%)  CAPILLARY BLOOD GLUCOSE        I&O's Summary    16 Feb 2021 07:01  -  17 Feb 2021 07:00  --------------------------------------------------------  IN: 560 mL / OUT: 2795 mL / NET: -2235 mL      tele:    PHYSICAL EXAM:    GENERAL: NAD   HEENT: EOMI, PERRL  PULM: Clear to auscultation bilaterally  CV: Regular rate and rhythm; nl S1, S2; No murmurs, rubs, or gallops  ABDOMEN: Soft, Nontender, Nondistended; Bowel sounds present  EXTREMITIES/MSK:  No edema, calf tenderness   PSYCH: AAOx3  NEUROLOGY: non-focal          LABS:                        10.3   12.14 )-----------( 401      ( 17 Feb 2021 09:11 )             33.1     02-17    139  |  103  |  20  ----------------------------<  84  4.0   |  25  |  1.27    Ca    8.4      17 Feb 2021 09:11                  RADIOLOGY & ADDITIONAL TESTS:    Imaging Personally Reviewed:    Consultant(s) Notes Reviewed:      Care Discussed with Consultants/Other Providers:   Colton Coffey   Pager [phone]  Office [phone]      CC: Patient is a 76y old  Male who presents with a chief complaint of dens fx s/p fall (16 Feb 2021 16:09)      SUBJECTIVE / OVERNIGHT EVENTS: post-op pain controlled. no f/c/r. no cp/sob. no n/v/d/abd pain.     MEDICATIONS  (STANDING):  enoxaparin Injectable 40 milliGRAM(s) SubCutaneous <User Schedule>  multivitamin 1 Tablet(s) Oral daily  senna 2 Tablet(s) Oral at bedtime  tamsulosin 0.4 milliGRAM(s) Oral daily    MEDICATIONS  (PRN):  acetaminophen   Tablet .. 650 milliGRAM(s) Oral every 6 hours PRN Temp greater or equal to 38C (100.4F), Mild Pain (1 - 3)  cyclobenzaprine 10 milliGRAM(s) Oral three times a day PRN Muscle Spasm  melatonin 3 milliGRAM(s) Oral at bedtime PRN Insomnia  naloxone Injectable 0.1 milliGRAM(s) IV Push every 3 minutes PRN For ANY of the following changes in patient status:  A. RR LESS THAN 10 breaths per minute, B. Oxygen saturation LESS THAN 90%, C. Sedation score of 6  ondansetron Injectable 4 milliGRAM(s) IV Push every 6 hours PRN Nausea and/or Vomiting  oxyCODONE    IR 5 milliGRAM(s) Oral every 4 hours PRN Moderate Pain (4 - 6)  oxyCODONE    IR 10 milliGRAM(s) Oral every 4 hours PRN Severe Pain (7 - 10)      Vital Signs Last 24 Hrs  T(C): 36.6 (17 Feb 2021 08:25), Max: 36.7 (16 Feb 2021 12:17)  T(F): 97.9 (17 Feb 2021 08:25), Max: 98 (16 Feb 2021 12:17)  HR: 82 (17 Feb 2021 08:25) (75 - 83)  BP: 133/81 (17 Feb 2021 08:25) (132/74 - 152/78)  BP(mean): --  RR: 18 (17 Feb 2021 08:25) (18 - 18)  SpO2: 98% (17 Feb 2021 08:25) (94% - 99%)  CAPILLARY BLOOD GLUCOSE        I&O's Summary    16 Feb 2021 07:01  -  17 Feb 2021 07:00  --------------------------------------------------------  IN: 560 mL / OUT: 2795 mL / NET: -2235 mL        PHYSICAL EXAM:    GENERAL: NAD   HEENT: EOMI, PERRL  PULM: Clear to auscultation bilaterally  CV: Regular rate and rhythm; nl S1, S2; No murmurs, rubs, or gallops  ABDOMEN: Soft, Nontender, Nondistended; Bowel sounds present  EXTREMITIES/MSK:  No edema, calf tenderness   PSYCH: AAOx3  NEUROLOGY: non-focal          LABS:                        10.3   12.14 )-----------( 401      ( 17 Feb 2021 09:11 )             33.1     02-17    139  |  103  |  20  ----------------------------<  84  4.0   |  25  |  1.27    Ca    8.4      17 Feb 2021 09:11                  RADIOLOGY & ADDITIONAL TESTS:    Imaging Personally Reviewed:    Consultant(s) Notes Reviewed:      Care Discussed with Consultants/Other Providers: neurosurg regard urinary retention   Colton Coffey   Pager [phone]  Office [phone]      CC: Patient is a 76y old  Male who presents with a chief complaint of dens fx s/p fall (16 Feb 2021 16:09)      SUBJECTIVE / OVERNIGHT EVENTS: post-op pain controlled. no f/c/r. no cp/sob. no n/v/abd pain. diarrhea resolved    MEDICATIONS  (STANDING):  enoxaparin Injectable 40 milliGRAM(s) SubCutaneous <User Schedule>  multivitamin 1 Tablet(s) Oral daily  senna 2 Tablet(s) Oral at bedtime  tamsulosin 0.4 milliGRAM(s) Oral daily    MEDICATIONS  (PRN):  acetaminophen   Tablet .. 650 milliGRAM(s) Oral every 6 hours PRN Temp greater or equal to 38C (100.4F), Mild Pain (1 - 3)  cyclobenzaprine 10 milliGRAM(s) Oral three times a day PRN Muscle Spasm  melatonin 3 milliGRAM(s) Oral at bedtime PRN Insomnia  naloxone Injectable 0.1 milliGRAM(s) IV Push every 3 minutes PRN For ANY of the following changes in patient status:  A. RR LESS THAN 10 breaths per minute, B. Oxygen saturation LESS THAN 90%, C. Sedation score of 6  ondansetron Injectable 4 milliGRAM(s) IV Push every 6 hours PRN Nausea and/or Vomiting  oxyCODONE    IR 5 milliGRAM(s) Oral every 4 hours PRN Moderate Pain (4 - 6)  oxyCODONE    IR 10 milliGRAM(s) Oral every 4 hours PRN Severe Pain (7 - 10)      Vital Signs Last 24 Hrs  T(C): 36.6 (17 Feb 2021 08:25), Max: 36.7 (16 Feb 2021 12:17)  T(F): 97.9 (17 Feb 2021 08:25), Max: 98 (16 Feb 2021 12:17)  HR: 82 (17 Feb 2021 08:25) (75 - 83)  BP: 133/81 (17 Feb 2021 08:25) (132/74 - 152/78)  BP(mean): --  RR: 18 (17 Feb 2021 08:25) (18 - 18)  SpO2: 98% (17 Feb 2021 08:25) (94% - 99%)  CAPILLARY BLOOD GLUCOSE        I&O's Summary    16 Feb 2021 07:01  -  17 Feb 2021 07:00  --------------------------------------------------------  IN: 560 mL / OUT: 2795 mL / NET: -2235 mL        PHYSICAL EXAM:    GENERAL: NAD   HEENT: EOMI, PERRL  PULM: Clear to auscultation bilaterally  CV: Regular rate and rhythm; nl S1, S2; No murmurs, rubs, or gallops  ABDOMEN: Soft, Nontender, Nondistended; Bowel sounds present  EXTREMITIES/MSK:  No edema, calf tenderness   PSYCH: AAOx3  NEUROLOGY: non-focal          LABS:                        10.3   12.14 )-----------( 401      ( 17 Feb 2021 09:11 )             33.1     02-17    139  |  103  |  20  ----------------------------<  84  4.0   |  25  |  1.27    Ca    8.4      17 Feb 2021 09:11                  RADIOLOGY & ADDITIONAL TESTS:    Imaging Personally Reviewed:    Consultant(s) Notes Reviewed:      Care Discussed with Consultants/Other Providers: neurosurg regard urinary retention

## 2021-02-17 NOTE — PROGRESS NOTE ADULT - ASSESSMENT
Cervical spine fx  Dens fx  s/p fusion   fu with nsx   pain control     HTN  would not treat too aggressively now given high risk of orthostatic hypotension     CKD  as per med     DVT proph  on lovenox

## 2021-02-18 LAB
ANION GAP SERPL CALC-SCNC: 11 MMOL/L — SIGNIFICANT CHANGE UP (ref 5–17)
BASOPHILS # BLD AUTO: 0.05 K/UL — SIGNIFICANT CHANGE UP (ref 0–0.2)
BASOPHILS NFR BLD AUTO: 0.5 % — SIGNIFICANT CHANGE UP (ref 0–2)
BUN SERPL-MCNC: 21 MG/DL — SIGNIFICANT CHANGE UP (ref 7–23)
CALCIUM SERPL-MCNC: 9 MG/DL — SIGNIFICANT CHANGE UP (ref 8.4–10.5)
CHLORIDE SERPL-SCNC: 106 MMOL/L — SIGNIFICANT CHANGE UP (ref 96–108)
CO2 SERPL-SCNC: 23 MMOL/L — SIGNIFICANT CHANGE UP (ref 22–31)
CREAT SERPL-MCNC: 1.37 MG/DL — HIGH (ref 0.5–1.3)
EOSINOPHIL # BLD AUTO: 0.15 K/UL — SIGNIFICANT CHANGE UP (ref 0–0.5)
EOSINOPHIL NFR BLD AUTO: 1.4 % — SIGNIFICANT CHANGE UP (ref 0–6)
GLUCOSE SERPL-MCNC: 87 MG/DL — SIGNIFICANT CHANGE UP (ref 70–99)
HCT VFR BLD CALC: 35.6 % — LOW (ref 39–50)
HGB BLD-MCNC: 11.1 G/DL — LOW (ref 13–17)
IMM GRANULOCYTES NFR BLD AUTO: 0.5 % — SIGNIFICANT CHANGE UP (ref 0–1.5)
LMWH PPP CHRO-ACNC: 0.14 IU/ML — LOW (ref 0.5–1.1)
LYMPHOCYTES # BLD AUTO: 1.49 K/UL — SIGNIFICANT CHANGE UP (ref 1–3.3)
LYMPHOCYTES # BLD AUTO: 14.3 % — SIGNIFICANT CHANGE UP (ref 13–44)
MCHC RBC-ENTMCNC: 28.6 PG — SIGNIFICANT CHANGE UP (ref 27–34)
MCHC RBC-ENTMCNC: 31.2 GM/DL — LOW (ref 32–36)
MCV RBC AUTO: 91.8 FL — SIGNIFICANT CHANGE UP (ref 80–100)
MONOCYTES # BLD AUTO: 1.2 K/UL — HIGH (ref 0–0.9)
MONOCYTES NFR BLD AUTO: 11.5 % — SIGNIFICANT CHANGE UP (ref 2–14)
NEUTROPHILS # BLD AUTO: 7.47 K/UL — HIGH (ref 1.8–7.4)
NEUTROPHILS NFR BLD AUTO: 71.8 % — SIGNIFICANT CHANGE UP (ref 43–77)
NRBC # BLD: 0 /100 WBCS — SIGNIFICANT CHANGE UP (ref 0–0)
PLATELET # BLD AUTO: 434 K/UL — HIGH (ref 150–400)
POTASSIUM SERPL-MCNC: 4.4 MMOL/L — SIGNIFICANT CHANGE UP (ref 3.5–5.3)
POTASSIUM SERPL-SCNC: 4.4 MMOL/L — SIGNIFICANT CHANGE UP (ref 3.5–5.3)
RBC # BLD: 3.88 M/UL — LOW (ref 4.2–5.8)
RBC # FLD: 13.2 % — SIGNIFICANT CHANGE UP (ref 10.3–14.5)
SODIUM SERPL-SCNC: 140 MMOL/L — SIGNIFICANT CHANGE UP (ref 135–145)
WBC # BLD: 10.41 K/UL — SIGNIFICANT CHANGE UP (ref 3.8–10.5)
WBC # FLD AUTO: 10.41 K/UL — SIGNIFICANT CHANGE UP (ref 3.8–10.5)

## 2021-02-18 PROCEDURE — 99232 SBSQ HOSP IP/OBS MODERATE 35: CPT

## 2021-02-18 RX ORDER — POLYETHYLENE GLYCOL 3350 17 G/17G
17 POWDER, FOR SOLUTION ORAL DAILY
Refills: 0 | Status: DISCONTINUED | OUTPATIENT
Start: 2021-02-18 | End: 2021-02-19

## 2021-02-18 RX ORDER — SODIUM CHLORIDE 9 MG/ML
1000 INJECTION, SOLUTION INTRAVENOUS
Refills: 0 | Status: DISCONTINUED | OUTPATIENT
Start: 2021-02-18 | End: 2021-02-18

## 2021-02-18 RX ORDER — SODIUM CHLORIDE 9 MG/ML
1000 INJECTION INTRAMUSCULAR; INTRAVENOUS; SUBCUTANEOUS
Refills: 0 | Status: DISCONTINUED | OUTPATIENT
Start: 2021-02-18 | End: 2021-02-18

## 2021-02-18 RX ORDER — HEPARIN SODIUM 5000 [USP'U]/ML
5000 INJECTION INTRAVENOUS; SUBCUTANEOUS EVERY 12 HOURS
Refills: 0 | Status: DISCONTINUED | OUTPATIENT
Start: 2021-02-18 | End: 2021-02-19

## 2021-02-18 RX ORDER — SODIUM CHLORIDE 9 MG/ML
1000 INJECTION INTRAMUSCULAR; INTRAVENOUS; SUBCUTANEOUS
Refills: 0 | Status: DISCONTINUED | OUTPATIENT
Start: 2021-02-18 | End: 2021-02-19

## 2021-02-18 RX ADMIN — Medication 650 MILLIGRAM(S): at 22:24

## 2021-02-18 RX ADMIN — OXYCODONE HYDROCHLORIDE 5 MILLIGRAM(S): 5 TABLET ORAL at 06:42

## 2021-02-18 RX ADMIN — TAMSULOSIN HYDROCHLORIDE 0.4 MILLIGRAM(S): 0.4 CAPSULE ORAL at 11:49

## 2021-02-18 RX ADMIN — SENNA PLUS 2 TABLET(S): 8.6 TABLET ORAL at 21:26

## 2021-02-18 RX ADMIN — OXYCODONE HYDROCHLORIDE 5 MILLIGRAM(S): 5 TABLET ORAL at 12:45

## 2021-02-18 RX ADMIN — Medication 650 MILLIGRAM(S): at 16:44

## 2021-02-18 RX ADMIN — HEPARIN SODIUM 5000 UNIT(S): 5000 INJECTION INTRAVENOUS; SUBCUTANEOUS at 17:21

## 2021-02-18 RX ADMIN — POLYETHYLENE GLYCOL 3350 17 GRAM(S): 17 POWDER, FOR SOLUTION ORAL at 11:49

## 2021-02-18 RX ADMIN — SODIUM CHLORIDE 50 MILLILITER(S): 9 INJECTION INTRAMUSCULAR; INTRAVENOUS; SUBCUTANEOUS at 11:59

## 2021-02-18 RX ADMIN — Medication 1 TABLET(S): at 11:49

## 2021-02-18 NOTE — OCCUPATIONAL THERAPY INITIAL EVALUATION ADULT - ADDITIONAL COMMENTS
MR C spine 2/9 Acute impacted type II-III dens fracture without the placement of fracture fragments. No canal compression or intraspinal hemorrhage. No cord compression. Unremarkable thoracic and lumbar spine.  CT Abdomen/pelvis Acute mildly displaced fracture of the left inferior pubic ramus. Acute minimally displaced fractures of the left superior pubic ramus and left acetabulum.  VA duplex (-)

## 2021-02-18 NOTE — OCCUPATIONAL THERAPY INITIAL EVALUATION ADULT - RANGE OF MOTION EXAMINATION, UPPER EXTREMITY
Hypercholesterolemia Monitoring: I explained this is common when taking isotretinoin. We will monitor closely. b/l shoulder 0-80

## 2021-02-18 NOTE — PROGRESS NOTE ADULT - SUBJECTIVE AND OBJECTIVE BOX
SUBJECTIVE: Comfortable. [place] on. c/o stiffness.    OVERNIGHT EVENTS: None  Vital Signs Last 24 Hrs  T(C): 36.9 (18 Feb 2021 08:50), Max: 36.9 (18 Feb 2021 04:36)  T(F): 98.4 (18 Feb 2021 08:50), Max: 98.5 (18 Feb 2021 04:36)  HR: 91 (18 Feb 2021 08:50) (60 - 105)  BP: 105/69 (18 Feb 2021 08:50) (105/69 - 158/90)  BP(mean): --  RR: 18 (18 Feb 2021 08:50) (18 - 18)  SpO2: 98% (18 Feb 2021 08:50) (95% - 99%)  IVF: [ ] IVL [ X] LR@50  DIET: [X ] Regular [ ] CCD [ ] Renal [ ] Puree [ ] Dysphagia [ ] Tube Feeds:   PCA: [ ] YES [X ] NO   DO: [X ] YES [ ] NO [ ] VOID   BM: [X ] YES-on 2/16    DRAINS: [ ] DENISE (cc/24h) [ X] HMV 75 (cc/24h)    PHYSICAL EXAM:    Constitutional: No Acute Distress     Neurological: AAOx2-3, Following Commands, PERRL, speech clear, but hypophonic, following commands, LLE HF 4+/5, otherwise 5/5.    Sensation: [X] intact to light touch  [] decreased:     Pulmonary: Clear to Auscultation, No rales, No rhonchi, No wheezes     Cardiovascular: S1, S2, Regular rate and rhythm     Gastrointestinal: Soft, Non-tender, Non-distended     Extremities: No calf tenderness     Incision: HMV active. [place] on. Aquacel dsg-CDI/Flat    LABS:                        11.1   10.41 )-----------( 434      ( 18 Feb 2021 06:32 )             35.6    02-18    140  |  106  |  21  ----------------------------<  87  4.4   |  23  |  1.37<H>    Ca    9.0      18 Feb 2021 06:32    COVID-19 PCR: NotDetec (14 Feb 2021 09:13)  COVID-19 PCR: NotDetec (10 Feb 2021 23:59)  COVID-19 PCR: NotDetec (08 Feb 2021 19:27)    IMAGING:  < from: CT Cervical Spine No Cont (02.16.21 @ 10:10) >  Redemonstrated C2 fracture extending from the base of the dens to the lateral masses with mild displacement with occiput to [id number] instrumentation and fusion. Bone graft placement noted. Dorsal soft tissue percutaneous drain is noted. Again noted slight bulging of the occiput fixation screws into the posterior fossa. The right C4 lateral mass screw partially encroachesthe right C4-C5 neuroforamen. No interval change.    No new fracture identified.    Retropharyngeal edema is noted throughout the cervical levels.    Included lung apices are clear.    IMPRESSION:  Mildly displaced C2 fracture extending into the lateral masses. Changes of occiput to C4 fusion. No significant interval change.    < from: US Kidney and Bladder (02.10.21 @ 17:14) >  Decrease in the dilatation of the left hydronephrosis. Mild left-sided hydronephrosis remains. No right-sided hydronephrosis.    The urinary bladder is decompressed by indwelling Do catheter. The urinary bladder wall appears thickened. Detailed evaluation of the bladder cannot be performed and should be considered with Do catheter clamped or removed.    Prostate gland cannot be evaluated.    < from: VA Duplex Lower Ext Vein Scan, Bilat (02.10.21 @ 15:41) >  No evidence of deep venous thrombosis in either lower extremity.    < from: Xray Pelvis 3 Views (02.09.21 @ 01:20) >  FINDINGS: There is a fracture of the left acetabulum which is better visualized on the CT scan. The joint spaces are intact bilaterally. No significant degenerative changes are present. The sacroiliac joint spaces appear unremarkable. No osseous lesion is seen. The overlying soft tissues appear unremarkable. Contrast is present in the urinary bladder.    IMPRESSION:  Fracture of the left acetabulum, better seen on the recent CT scan.    MEDICATIONS  (STANDING):  enoxaparin Injectable 40 milliGRAM(s) SubCutaneous <User Schedule>  lactated ringers. 1000 milliLiter(s) (50 mL/Hr) IV Continuous <Continuous>  multivitamin 1 Tablet(s) Oral daily  senna 2 Tablet(s) Oral at bedtime  tamsulosin 0.4 milliGRAM(s) Oral daily    MEDICATIONS  (PRN):  acetaminophen   Tablet .. 650 milliGRAM(s) Oral every 6 hours PRN Temp greater or equal to 38C (100.4F), Mild Pain (1 - 3)  cyclobenzaprine 10 milliGRAM(s) Oral three times a day PRN Muscle Spasm  melatonin 3 milliGRAM(s) Oral at bedtime PRN Insomnia  naloxone Injectable 0.1 milliGRAM(s) IV Push every 3 minutes PRN For ANY of the following changes in patient status:  A. RR LESS THAN 10 breaths per minute, B. Oxygen saturation LESS THAN 90%, C. Sedation score of 6  ondansetron Injectable 4 milliGRAM(s) IV Push every 6 hours PRN Nausea and/or Vomiting  oxyCODONE    IR 5 milliGRAM(s) Oral every 4 hours PRN Moderate Pain (4 - 6)  oxyCODONE    IR 10 milliGRAM(s) Oral every 4 hours PRN Severe Pain (7 - 10)

## 2021-02-18 NOTE — PROGRESS NOTE ADULT - SUBJECTIVE AND OBJECTIVE BOX
DATE OF SERVICE: 02-18-21 @ 08:08    Subjective: Patient seen and examined. No new events except as noted.     SUBJECTIVE/ROS:  feels ok       MEDICATIONS:  MEDICATIONS  (STANDING):  enoxaparin Injectable 40 milliGRAM(s) SubCutaneous <User Schedule>  multivitamin 1 Tablet(s) Oral daily  senna 2 Tablet(s) Oral at bedtime  tamsulosin 0.4 milliGRAM(s) Oral daily      PHYSICAL EXAM:  T(C): 36.9 (02-18-21 @ 04:36), Max: 36.9 (02-18-21 @ 04:36)  HR: 60 (02-18-21 @ 04:36) (60 - 105)  BP: 154/78 (02-18-21 @ 04:36) (126/82 - 158/90)  RR: 18 (02-18-21 @ 04:36) (18 - 18)  SpO2: 96% (02-18-21 @ 04:36) (95% - 99%)  Wt(kg): --  I&O's Summary    17 Feb 2021 07:01  -  18 Feb 2021 07:00  --------------------------------------------------------  IN: 840 mL / OUT: 2375 mL / NET: -1535 mL            JVP: Normal  Neck: supple  Lung: clear   CV: S1 S2 , Murmur:  Abd: soft  Ext: No edema  neuro: Awake / alert  Psych: flat affect  Skin: normal``    LABS/DATA:    CARDIAC MARKERS:                                11.1   10.41 )-----------( 434      ( 18 Feb 2021 06:32 )             35.6     02-18    140  |  106  |  21  ----------------------------<  87  4.4   |  23  |  1.37<H>    Ca    9.0      18 Feb 2021 06:32      proBNP:   Lipid Profile:   HgA1c:   TSH:     TELE:  EKG:

## 2021-02-18 NOTE — PROGRESS NOTE ADULT - PROBLEM SELECTOR PLAN 2
improved  closely monitor urine output / BUN/CR stable. may have risen slightly bec pt has sig more output than input recently. will start low IVF. Called pt's GI, Dr. Nick for past bloodwork as this was the last doctor pt saw, but last visit was 2 years ago and blood work was not done.  -closely monitor urine output / BUN/CR  -pt aware of need for close outpt f/u and no NSAIDs

## 2021-02-18 NOTE — PROGRESS NOTE ADULT - SUBJECTIVE AND OBJECTIVE BOX
Colton Coffey   Pager [phone]  Office [phone]      CC: Patient is a 76y old  Male who presents with a chief complaint of dens fx s/p fall (17 Feb 2021 10:43)      SUBJECTIVE / OVERNIGHT EVENTS:    MEDICATIONS  (STANDING):  enoxaparin Injectable 40 milliGRAM(s) SubCutaneous <User Schedule>  multivitamin 1 Tablet(s) Oral daily  senna 2 Tablet(s) Oral at bedtime  tamsulosin 0.4 milliGRAM(s) Oral daily    MEDICATIONS  (PRN):  acetaminophen   Tablet .. 650 milliGRAM(s) Oral every 6 hours PRN Temp greater or equal to 38C (100.4F), Mild Pain (1 - 3)  cyclobenzaprine 10 milliGRAM(s) Oral three times a day PRN Muscle Spasm  melatonin 3 milliGRAM(s) Oral at bedtime PRN Insomnia  naloxone Injectable 0.1 milliGRAM(s) IV Push every 3 minutes PRN For ANY of the following changes in patient status:  A. RR LESS THAN 10 breaths per minute, B. Oxygen saturation LESS THAN 90%, C. Sedation score of 6  ondansetron Injectable 4 milliGRAM(s) IV Push every 6 hours PRN Nausea and/or Vomiting  oxyCODONE    IR 5 milliGRAM(s) Oral every 4 hours PRN Moderate Pain (4 - 6)  oxyCODONE    IR 10 milliGRAM(s) Oral every 4 hours PRN Severe Pain (7 - 10)      Vital Signs Last 24 Hrs  T(C): 36.9 (18 Feb 2021 08:50), Max: 36.9 (18 Feb 2021 04:36)  T(F): 98.4 (18 Feb 2021 08:50), Max: 98.5 (18 Feb 2021 04:36)  HR: 91 (18 Feb 2021 08:50) (60 - 105)  BP: 105/69 (18 Feb 2021 08:50) (105/69 - 158/90)  BP(mean): --  RR: 18 (18 Feb 2021 08:50) (18 - 18)  SpO2: 98% (18 Feb 2021 08:50) (95% - 99%)  CAPILLARY BLOOD GLUCOSE        I&O's Summary    17 Feb 2021 07:01  -  18 Feb 2021 07:00  --------------------------------------------------------  IN: 840 mL / OUT: 2375 mL / NET: -1535 mL      tele:    PHYSICAL EXAM:    GENERAL: NAD   HEENT: EOMI, PERRL  PULM: Clear to auscultation bilaterally  CV: Regular rate and rhythm; nl S1, S2; No murmurs, rubs, or gallops  ABDOMEN: Soft, Nontender, Nondistended; Bowel sounds present  EXTREMITIES/MSK:  No edema, calf tenderness   PSYCH: AAOx3  NEUROLOGY: non-focal          LABS:                        11.1   10.41 )-----------( 434      ( 18 Feb 2021 06:32 )             35.6     02-18    140  |  106  |  21  ----------------------------<  87  4.4   |  23  |  1.37<H>    Ca    9.0      18 Feb 2021 06:32                  RADIOLOGY & ADDITIONAL TESTS:    Imaging Personally Reviewed:    Consultant(s) Notes Reviewed:      Care Discussed with Consultants/Other Providers:   Colton Coffey   Pager [phone]  Office [phone]      CC: Patient is a 76y old  Male who presents with a chief complaint of dens fx s/p fall (17 Feb 2021 10:43)      SUBJECTIVE / OVERNIGHT EVENTS: post-op pain improved. no cp/sob. no n/v/d/abd pain. no cough    MEDICATIONS  (STANDING):  enoxaparin Injectable 40 milliGRAM(s) SubCutaneous <User Schedule>  multivitamin 1 Tablet(s) Oral daily  senna 2 Tablet(s) Oral at bedtime  tamsulosin 0.4 milliGRAM(s) Oral daily    MEDICATIONS  (PRN):  acetaminophen   Tablet .. 650 milliGRAM(s) Oral every 6 hours PRN Temp greater or equal to 38C (100.4F), Mild Pain (1 - 3)  cyclobenzaprine 10 milliGRAM(s) Oral three times a day PRN Muscle Spasm  melatonin 3 milliGRAM(s) Oral at bedtime PRN Insomnia  naloxone Injectable 0.1 milliGRAM(s) IV Push every 3 minutes PRN For ANY of the following changes in patient status:  A. RR LESS THAN 10 breaths per minute, B. Oxygen saturation LESS THAN 90%, C. Sedation score of 6  ondansetron Injectable 4 milliGRAM(s) IV Push every 6 hours PRN Nausea and/or Vomiting  oxyCODONE    IR 5 milliGRAM(s) Oral every 4 hours PRN Moderate Pain (4 - 6)  oxyCODONE    IR 10 milliGRAM(s) Oral every 4 hours PRN Severe Pain (7 - 10)      Vital Signs Last 24 Hrs  T(C): 36.9 (18 Feb 2021 08:50), Max: 36.9 (18 Feb 2021 04:36)  T(F): 98.4 (18 Feb 2021 08:50), Max: 98.5 (18 Feb 2021 04:36)  HR: 91 (18 Feb 2021 08:50) (60 - 105)  BP: 105/69 (18 Feb 2021 08:50) (105/69 - 158/90)  BP(mean): --  RR: 18 (18 Feb 2021 08:50) (18 - 18)  SpO2: 98% (18 Feb 2021 08:50) (95% - 99%)  CAPILLARY BLOOD GLUCOSE        I&O's Summary    17 Feb 2021 07:01  -  18 Feb 2021 07:00  --------------------------------------------------------  IN: 840 mL / OUT: 2375 mL / NET: -1535 mL          PHYSICAL EXAM:    GENERAL: NAD   HEENT: EOMI, PERRL  PULM: Clear to auscultation bilaterally  CV: Regular rate and rhythm; nl S1, S2; No murmurs, rubs, or gallops  ABDOMEN: Soft, Nontender, Nondistended; Bowel sounds present  EXTREMITIES/MSK:  No edema, calf tenderness   PSYCH: AAOx3  NEUROLOGY: non-focal          LABS:                        11.1   10.41 )-----------( 434      ( 18 Feb 2021 06:32 )             35.6     02-18    140  |  106  |  21  ----------------------------<  87  4.4   |  23  |  1.37<H>    Ca    9.0      18 Feb 2021 06:32                  RADIOLOGY & ADDITIONAL TESTS:    Imaging Personally Reviewed:    Consultant(s) Notes Reviewed:      Care Discussed with Consultants/Other Providers: neurosurg regard kidney function and urinary retention

## 2021-02-18 NOTE — OCCUPATIONAL THERAPY INITIAL EVALUATION ADULT - NS ASR FOLLOW COMMAND OT EVAL
normal for race
increased processing time-frequent redirection cues/100% of the time/able to follow single-step instructions

## 2021-02-18 NOTE — OCCUPATIONAL THERAPY INITIAL EVALUATION ADULT - LIVES WITH, PROFILE
Other
as per pt: PTA pt was living in a PH + Stairs (Pt states has 2 floors + hand rail,) kitchen bathroom on 1st a bedroom on second. and was independent in all functional mobility and ADL's. no AD for gait. lives alone./alone

## 2021-02-18 NOTE — PROGRESS NOTE ADULT - ASSESSMENT
Patient is a 75y old  Male who presents with a chief complaint of fall     HPI: 75 M with no PMH presents s/p mechanical fall on Thursday when he slipped down stairs, landed on his back. He hit his head and had some bleeding however no loss of consciousness. Since then he has had difficulty ambulating. He has been having pain in the neck, chest wall, and L hip. Patient is not on any blood thinners. No fevers, chills, chest pain, shortness of breath. No n/v/diarrhea.    Of note, patient found to have L hydroureteronephrosis as well as distended bladder on imaging. Patient denies any difficulty urinating however has not urinated since ED admission. He denies any dysuria or hematuria however has been having increased urinary frequency.     (09 Feb 2021 03:45)    PROCEDURE: Adm 2/9. 2/19 Occip to C4 fusion    POD#3    PLAN:  Neuro: Cont HMV drain.  Per urology note-DC dutton when more active/OOB and if Cr improves. ALEKSANDR-DC SQL and start SQH now. Cont IVF, FU Cr AM.  Follow-up with Dr. Strickland in the office in 2 weeks; call office for appointment.Inc activity/OOB.     Cardio note of 2/1824-[id number]. CKD as per med. DVT proph-on lovenox. Electronic Signatures: Omari Garrison)    Medicine note of 2/18-·  Problem: ALEKSANDR (acute kidney injury).  Plan: stable. may have risen slightly bec pt has sig more output than input recently. will start low IVF. Called pt's GI, Dr. Nick for past bloodwork as this was the last doctor pt saw, but last visit was 2 years ago and blood work was not done. -closely monitor urine output / BUN/CR. -pt aware of need for close outpt f/u and no NSAIDsProblem: Urinary retention.  Plan: cont Dutton and Flomax. apprec Urology input. consider TOV if ok with Urology.   Problem: Hydronephrosis of left kidney.  Plan: f/u renal US shows improved left hydro. will need outpt f/u- d/w pt. Problem: HTN (hypertension).  Plan: BP is stable. pain control. Problem: Tremor. Plan: Fine resting tremor Lt >RT upper extremity  which is not new as per patient outpt neuro eval for Parkinsons d/w pt. Electronic Signatures: Colton Coffey)    Urology note of 2/11-76 yo male with urinary retention of 1800cc, bl hydronephrosis - right mld and left severe, enlarged prostate with median lobe, ALEKSANDR upon presentation after fall.  - Continue flomax. - ALEKSANDR resolving, continue to trend. - Keep dutton until Cr nadirs and patient is at baseline ambulatory status. - Avoid. narcotics/constipation, bowel regimen to ensure soft stools (senna/colace/miralax prn) - Trial of Void per primary team, ensure post-void residual obtained  - Consider renal u/s f/u imaging after ALEKSANDR/POD resolves Electronic Signatures: Rigo Rios)     Ortho note of [birth year]/9-75y Male with left inferior and high superior pubic rami/ant column non displaced fractures. Plan: No acute orthopedic surgical intervention indicated at this time. FU NSx, pt with rossy Mccrary operative management per Neurosurgery, In C Collar  WBAT LLE, assistive devices as needed Pain control. Ice application.  DVT prophylaxis. Follow-up with Dr. Strickland in the office in 2 weeks; call office for appointment. ortho stable will d/w attending, advise with any changes Attending Attestation: Patient examined. Chart and X-rays reviewed. Agree with above note. Rico Strickland MD .Electronic Signatures: Pk Mejia)  (Signed     Respiratory: Patient instructed to use incentive spirometer [ X] YES [ ] NO              DVT ppx: [ ] SQL [ X] SQH and Venodynes [ ] Left [ ] Right [ X] Bilateral    Discharge Planning:  The patient was evaluated by PT/PMR and recommended Acute Rehab.       More than 30 minutes spent on total encounter: more than 50% of the visit was spent on educating the patient and family regarding condition, medications, follow up plans, signs and symptoms to be concerned with, preparing paperwork, and questions answered regarding discharge.

## 2021-02-19 ENCOUNTER — TRANSCRIPTION ENCOUNTER (OUTPATIENT)
Age: 76
End: 2021-02-19

## 2021-02-19 VITALS
RESPIRATION RATE: 18 BRPM | DIASTOLIC BLOOD PRESSURE: 75 MMHG | SYSTOLIC BLOOD PRESSURE: 125 MMHG | TEMPERATURE: 98 F | HEART RATE: 63 BPM | OXYGEN SATURATION: 98 %

## 2021-02-19 LAB
ANION GAP SERPL CALC-SCNC: 9 MMOL/L — SIGNIFICANT CHANGE UP (ref 5–17)
BUN SERPL-MCNC: 23 MG/DL — SIGNIFICANT CHANGE UP (ref 7–23)
CALCIUM SERPL-MCNC: 8.4 MG/DL — SIGNIFICANT CHANGE UP (ref 8.4–10.5)
CHLORIDE SERPL-SCNC: 107 MMOL/L — SIGNIFICANT CHANGE UP (ref 96–108)
CO2 SERPL-SCNC: 26 MMOL/L — SIGNIFICANT CHANGE UP (ref 22–31)
CREAT SERPL-MCNC: 1.33 MG/DL — HIGH (ref 0.5–1.3)
GLUCOSE SERPL-MCNC: 95 MG/DL — SIGNIFICANT CHANGE UP (ref 70–99)
HCT VFR BLD CALC: 32.1 % — LOW (ref 39–50)
HGB BLD-MCNC: 10.1 G/DL — LOW (ref 13–17)
MCHC RBC-ENTMCNC: 29.2 PG — SIGNIFICANT CHANGE UP (ref 27–34)
MCHC RBC-ENTMCNC: 31.5 GM/DL — LOW (ref 32–36)
MCV RBC AUTO: 92.8 FL — SIGNIFICANT CHANGE UP (ref 80–100)
NRBC # BLD: 0 /100 WBCS — SIGNIFICANT CHANGE UP (ref 0–0)
PLATELET # BLD AUTO: 437 K/UL — HIGH (ref 150–400)
POTASSIUM SERPL-MCNC: 4 MMOL/L — SIGNIFICANT CHANGE UP (ref 3.5–5.3)
POTASSIUM SERPL-SCNC: 4 MMOL/L — SIGNIFICANT CHANGE UP (ref 3.5–5.3)
RBC # BLD: 3.46 M/UL — LOW (ref 4.2–5.8)
RBC # FLD: 13.1 % — SIGNIFICANT CHANGE UP (ref 10.3–14.5)
SARS-COV-2 RNA SPEC QL NAA+PROBE: SIGNIFICANT CHANGE UP
SODIUM SERPL-SCNC: 142 MMOL/L — SIGNIFICANT CHANGE UP (ref 135–145)
WBC # BLD: 8.36 K/UL — SIGNIFICANT CHANGE UP (ref 3.8–10.5)
WBC # FLD AUTO: 8.36 K/UL — SIGNIFICANT CHANGE UP (ref 3.8–10.5)

## 2021-02-19 PROCEDURE — 99232 SBSQ HOSP IP/OBS MODERATE 35: CPT

## 2021-02-19 RX ORDER — TAMSULOSIN HYDROCHLORIDE 0.4 MG/1
1 CAPSULE ORAL
Qty: 0 | Refills: 0 | DISCHARGE
Start: 2021-02-19

## 2021-02-19 RX ORDER — LANOLIN ALCOHOL/MO/W.PET/CERES
1 CREAM (GRAM) TOPICAL
Qty: 0 | Refills: 0 | DISCHARGE
Start: 2021-02-19

## 2021-02-19 RX ORDER — OXYCODONE HYDROCHLORIDE 5 MG/1
1 TABLET ORAL
Qty: 0 | Refills: 0 | DISCHARGE
Start: 2021-02-19

## 2021-02-19 RX ORDER — POLYETHYLENE GLYCOL 3350 17 G/17G
17 POWDER, FOR SOLUTION ORAL
Qty: 0 | Refills: 0 | DISCHARGE
Start: 2021-02-19

## 2021-02-19 RX ORDER — HEPARIN SODIUM 5000 [USP'U]/ML
5000 INJECTION INTRAVENOUS; SUBCUTANEOUS
Qty: 0 | Refills: 0 | DISCHARGE
Start: 2021-02-19

## 2021-02-19 RX ORDER — ACETAMINOPHEN 500 MG
2 TABLET ORAL
Qty: 0 | Refills: 0 | DISCHARGE
Start: 2021-02-19

## 2021-02-19 RX ORDER — SENNA PLUS 8.6 MG/1
2 TABLET ORAL
Qty: 0 | Refills: 0 | DISCHARGE
Start: 2021-02-19

## 2021-02-19 RX ORDER — CYCLOBENZAPRINE HYDROCHLORIDE 10 MG/1
1 TABLET, FILM COATED ORAL
Qty: 0 | Refills: 0 | DISCHARGE
Start: 2021-02-19

## 2021-02-19 RX ORDER — SODIUM CHLORIDE 9 MG/ML
1000 INJECTION INTRAMUSCULAR; INTRAVENOUS; SUBCUTANEOUS
Refills: 0 | Status: DISCONTINUED | OUTPATIENT
Start: 2021-02-19 | End: 2021-02-19

## 2021-02-19 RX ADMIN — SODIUM CHLORIDE 50 MILLILITER(S): 9 INJECTION INTRAMUSCULAR; INTRAVENOUS; SUBCUTANEOUS at 05:29

## 2021-02-19 RX ADMIN — Medication 650 MILLIGRAM(S): at 12:06

## 2021-02-19 RX ADMIN — Medication 650 MILLIGRAM(S): at 19:48

## 2021-02-19 RX ADMIN — HEPARIN SODIUM 5000 UNIT(S): 5000 INJECTION INTRAVENOUS; SUBCUTANEOUS at 18:15

## 2021-02-19 RX ADMIN — HEPARIN SODIUM 5000 UNIT(S): 5000 INJECTION INTRAVENOUS; SUBCUTANEOUS at 05:27

## 2021-02-19 RX ADMIN — TAMSULOSIN HYDROCHLORIDE 0.4 MILLIGRAM(S): 0.4 CAPSULE ORAL at 11:25

## 2021-02-19 RX ADMIN — Medication 1 TABLET(S): at 11:25

## 2021-02-19 RX ADMIN — SODIUM CHLORIDE 50 MILLILITER(S): 9 INJECTION INTRAMUSCULAR; INTRAVENOUS; SUBCUTANEOUS at 11:25

## 2021-02-19 NOTE — DISCHARGE NOTE NURSING/CASE MANAGEMENT/SOCIAL WORK - PATIENT PORTAL LINK FT
You can access the FollowMyHealth Patient Portal offered by [facility] by registering at the following website: [email or web address]. By joining [facility]’s FollowMyHealth portal, you will also be able to view your health information using other applications (apps) compatible with our system.

## 2021-02-19 NOTE — DISCHARGE NOTE PROVIDER - CARE PROVIDERS DIRECT ADDRESSES
[email or web address]

## 2021-02-19 NOTE — DISCHARGE NOTE PROVIDER - NSDCFUADDAPPT_GEN_ALL_CORE_FT
Please call your Neurosurgeon for an appointment after Rehab  for wound check and further recommendations.    Followup with your Kim ETIENNE after Rehab    Follow-up with Orthopedics Dr. Strickland in the office in 2 weeks; call office for appointmente      Consider renal u/s f/u imaging after ALEKSANDR/POD resolves

## 2021-02-19 NOTE — DISCHARGE NOTE PROVIDER - NSDCMRMEDTOKEN_GEN_ALL_CORE_FT
acetaminophen 325 mg oral tablet: 2 tab(s) orally every 6 hours, As needed, Temp greater or equal to 38C (100.4F), Mild Pain (1 - 3)  bisacodyl 5 mg oral delayed release tablet: 1 tab(s) orally every 12 hours, As needed, Constipation  cyclobenzaprine 10 mg oral tablet: 1 tab(s) orally 3 times a day, As needed, Muscle Spasm  heparin: 5000 unit(s) subcutaneous 2 times a day  melatonin 3 mg oral tablet: 1 tab(s) orally once a day (at bedtime), As needed, Insomnia  Multiple Vitamins oral tablet: 1 tab(s) orally once a day  oxyCODONE 10 mg oral tablet: 1 tab(s) orally every 4 hours, As needed, Severe Pain (7 - 10)  oxyCODONE 5 mg oral tablet: 1 tab(s) orally every 4 hours, As needed, Moderate Pain (4 - 6)  polyethylene glycol 3350 oral powder for reconstitution: 17 gram(s) orally once a day  senna oral tablet: 2 tab(s) orally once a day (at bedtime)  tamsulosin 0.4 mg oral capsule: 1 cap(s) orally once a day

## 2021-02-19 NOTE — PROGRESS NOTE ADULT - PROBLEM SELECTOR PLAN 2
stable. may have risen slightly bec pt has sig more output than input recently. will start low IVF. Called pt's GI, Dr. Nick for past bloodwork as this was the last doctor pt saw, but last visit was 2 years ago and blood work was not done.  -closely monitor urine output / BUN/CR  -pt aware of need for close outpt f/u and no NSAIDs stable. may have risen slightly bec pt has sig more output than input recently.   -closely monitor urine output / BUN/CR  -pt aware of need for close outpt f/u and no NSAIDs. would repeat BMP at rehab in 2-3 days. oral intake encouraged

## 2021-02-19 NOTE — PROGRESS NOTE ADULT - NUTRITIONAL ASSESSMENT
This patient has been assessed with a concern for Malnutrition and has been determined to have a diagnosis/diagnoses of Underweight (BMI < 19).    This patient is being managed with:   Diet Regular-  Entered: Feb 15 2021  6:10PM    
This patient has been assessed with a concern for Malnutrition and has been determined to have a diagnosis/diagnoses of Underweight (BMI < 19).    This patient is being managed with:   Diet Regular-  Entered: Feb 15 2021  6:10PM    
This patient has been assessed with a concern for Malnutrition and has been determined to have a diagnosis/diagnoses of Underweight (BMI < 19).    This patient is being managed with:   Diet NPO after Midnight-     NPO Start Date: 14-Feb-2021   NPO Start Time: 23:59  Except Medications  Entered: Feb 14 2021  8:08AM    Diet Regular-  Entered: Feb 9 2021  9:45AM    
This patient has been assessed with a concern for Malnutrition and has been determined to have a diagnosis/diagnoses of Underweight (BMI < 19).    This patient is being managed with:   Diet Regular-  Entered: Feb 15 2021  6:10PM    
This patient has been assessed with a concern for Malnutrition and has been determined to have a diagnosis/diagnoses of Underweight (BMI < 19).    This patient is being managed with:   Diet NPO after Midnight-     NPO Start Date: 14-Feb-2021   NPO Start Time: 23:59  Except Medications  Entered: Feb 14 2021  8:08AM    Diet Regular-  Entered: Feb 9 2021  9:45AM    
This patient has been assessed with a concern for Malnutrition and has been determined to have a diagnosis/diagnoses of Underweight (BMI < 19).    This patient is being managed with:   Diet Regular-  Entered: Feb 15 2021  6:10PM    
This patient has been assessed with a concern for Malnutrition and has been determined to have a diagnosis/diagnoses of Underweight (BMI < 19).    This patient is being managed with:   Diet Regular-  Entered: Feb 15 2021  6:10PM    
This patient has been assessed with a concern for Malnutrition and has been determined to have a diagnosis/diagnoses of Underweight (BMI < 19).    This patient is being managed with:   Diet NPO after Midnight-     NPO Start Date: 14-Feb-2021   NPO Start Time: 23:59  Except Medications  Entered: Feb 14 2021  8:08AM    Diet Regular-  Entered: Feb 9 2021  9:45AM    
This patient has been assessed with a concern for Malnutrition and has been determined to have a diagnosis/diagnoses of Underweight (BMI < 19).    This patient is being managed with:   Diet NPO after Midnight-     NPO Start Date: 14-Feb-2021   NPO Start Time: 23:59  Except Medications  Entered: Feb 14 2021  8:08AM    Diet Regular-  Entered: Feb 9 2021  9:45AM

## 2021-02-19 NOTE — DISCHARGE NOTE PROVIDER - HOSPITAL COURSE
Patient is a 75y old  Male who presents with a chief complaint of fall     HPI: 75 M with no PMH presents s/p mechanical fall on Thursday when he slipped down stairs, landed on his back. He hit his head and had some bleeding however no loss of consciousness. Since then he has had difficulty ambulating. He has been having pain in the neck, chest wall, and L hip. Patient is not on any blood thinners. No fevers, chills, chest pain, shortness of breath. No n/v/diarrhea.    Of note, patient found to have L hydroureteronephrosis as well as distended bladder on imaging. Patient denies any difficulty urinating however has not urinated since ED admission. He denies any dysuria or hematuria however has been having increased urinary frequency.     (09 Feb 2021 03:45)    PROCEDURE: Adm 2/9. 2/19 Occip to C4 fusion    POD#4    PLAN:  Neuro: 2/19 HMV DC'd this AM. Per urology note-DC dutton when more active/OOB and if Cr improves. 2/19 Crt improving and should be rechecked at rehab 1-2 days.  ALEKSANDR-2/18 SQL DC'd and start SQH. Cont IVF for ALEKSANDR, [place] on at all times. 2/19 Nylon sutures x 3 DC'd.  Follow-up with Dr. Strickland in the office in 2 weeks; call office for appointment. Inc activity/OOB. Rehab once bed available. 2/19 Covid PCR sent FU.    Cardio note of 2/1832-[id number]. CKD as per med. DVT proph-on lovenox. Electronic Signatures: Omari Garrison)    Medicine note of 2/18-·  Problem: ALEKSANDR (acute kidney injury).  Plan: stable. may have risen slightly bec pt has sig more output than input recently. will start low IVF. Called pt's GI, Dr. Nick for past bloodwork as this was the last doctor pt saw, but last visit was 2 years ago and blood work was not done. -closely monitor urine output / BUN/CR. -pt aware of need for close outpt f/u and no NSAIDsProblem: Urinary retention.  Plan: cont Dutton and Flomax. apprec Urology input. consider TOV if ok with Urology.   Problem: Hydronephrosis of left kidney.  Plan: f/u renal US shows improved left hydro. will need outpt f/u- d/w pt. Problem: HTN (hypertension).  Plan: BP is stable. pain control. Problem: Tremor. Plan: Fine resting tremor Lt >RT upper extremity  which is not new as per patient outpt neuro eval for Parkinsons d/w pt. Electronic Signatures: Colton Coffey)    Urology note of 2/11-76 yo male with urinary retention of 1800cc, bl hydronephrosis - right mld and left severe, enlarged prostate with median lobe, ALEKSANDR upon presentation after fall.  - Continue flomax. - ALEKSANDR resolving, continue to trend. - Keep dutton until Cr nadirs and patient is at baseline ambulatory status. - Avoid. narcotics/constipation, bowel regimen to ensure soft stools (senna/colace/miralax prn) - Trial of Void per primary team, ensure post-void residual obtained. - Consider renal u/s f/u imaging after ALEKSANDR/POD resolves Electronic Signatures: Rigo Rios)     Ortho note of [birth year]-75y Male with left inferior and high superior pubic rami/ant column non displaced fractures. Plan: No acute orthopedic surgical intervention indicated at this time. FU NSx, pt with rossy Mccrary operative management per Neurosurgery, In C Collar  WBAT LLE, assistive devices as needed Pain control. Ice application.  DVT prophylaxis. Follow-up with Dr. Strickland in the office in 2 weeks; call office for appointment. ortho stable will d/w attending, advise with any changes Attending Attestation: Patient examined. Chart and X-rays reviewed. Agree with above note. Rico Strickland MD .Electronic Signatures: Pk Mejia)  (Signed     Respiratory: Patient instructed to use incentive spirometer [ X] YES [ ] NO              DVT ppx: [ ] SQL [ X] SQH and Venodynes [ ] Left [ ] Right [ X] Bilateral    Discharge Planning:  The patient was evaluated by PT/PMR and recommended Acute Rehab.

## 2021-02-19 NOTE — PROGRESS NOTE ADULT - SUBJECTIVE AND OBJECTIVE BOX
Colton Coffey   Pager [phone]  Office [phone]      CC: Patient is a 76y old  Male who presents with a chief complaint of dens fx s/p fall (17 Feb 2021 10:43)      SUBJECTIVE / OVERNIGHT EVENTS:    MEDICATIONS  (STANDING):  heparin   Injectable 5000 Unit(s) SubCutaneous every 12 hours  multivitamin 1 Tablet(s) Oral daily  polyethylene glycol 3350 17 Gram(s) Oral daily  senna 2 Tablet(s) Oral at bedtime  sodium chloride 0.9%. 1000 milliLiter(s) (50 mL/Hr) IV Continuous <Continuous>  tamsulosin 0.4 milliGRAM(s) Oral daily    MEDICATIONS  (PRN):  acetaminophen   Tablet .. 650 milliGRAM(s) Oral every 6 hours PRN Temp greater or equal to 38C (100.4F), Mild Pain (1 - 3)  bisacodyl 5 milliGRAM(s) Oral every 12 hours PRN Constipation  cyclobenzaprine 10 milliGRAM(s) Oral three times a day PRN Muscle Spasm  melatonin 3 milliGRAM(s) Oral at bedtime PRN Insomnia  naloxone Injectable 0.1 milliGRAM(s) IV Push every 3 minutes PRN For ANY of the following changes in patient status:  A. RR LESS THAN 10 breaths per minute, B. Oxygen saturation LESS THAN 90%, C. Sedation score of 6  ondansetron Injectable 4 milliGRAM(s) IV Push every 6 hours PRN Nausea and/or Vomiting  oxyCODONE    IR 5 milliGRAM(s) Oral every 4 hours PRN Moderate Pain (4 - 6)  oxyCODONE    IR 10 milliGRAM(s) Oral every 4 hours PRN Severe Pain (7 - 10)      Vital Signs Last 24 Hrs  T(C): 36.7 (19 Feb 2021 09:35), Max: 36.9 (19 Feb 2021 04:57)  T(F): 98.1 (19 Feb 2021 09:35), Max: 98.4 (19 Feb 2021 04:57)  HR: 93 (19 Feb 2021 09:35) (81 - 93)  BP: 140/84 (19 Feb 2021 09:35) (95/61 - 140/84)  BP(mean): --  RR: 18 (19 Feb 2021 09:35) (18 - 18)  SpO2: 99% (19 Feb 2021 09:35) (95% - 99%)  CAPILLARY BLOOD GLUCOSE        I&O's Summary    18 Feb 2021 07:01  -  19 Feb 2021 07:00  --------------------------------------------------------  IN: 2292 mL / OUT: 1740 mL / NET: 552 mL    19 Feb 2021 07:01  -  19 Feb 2021 13:04  --------------------------------------------------------  IN: 724 mL / OUT: 0 mL / NET: 724 mL      tele:    PHYSICAL EXAM:    GENERAL: NAD   HEENT: EOMI, PERRL  PULM: Clear to auscultation bilaterally  CV: Regular rate and rhythm; nl S1, S2; No murmurs, rubs, or gallops  ABDOMEN: Soft, Nontender, Nondistended; Bowel sounds present  EXTREMITIES/MSK:  No edema, calf tenderness   PSYCH: AAOx3  NEUROLOGY: non-focal          LABS:                        10.1   8.36  )-----------( 437      ( 19 Feb 2021 10:10 )             32.1     02-19    142  |  107  |  23  ----------------------------<  95  4.0   |  26  |  1.33<H>    Ca    8.4      19 Feb 2021 10:10                  RADIOLOGY & ADDITIONAL TESTS:    Imaging Personally Reviewed:    Consultant(s) Notes Reviewed:      Care Discussed with Consultants/Other Providers:   Colton Coffey   Pager [phone]  Office [phone]      CC: Patient is a 76y old  Male who presents with a chief complaint of dens fx s/p fall (17 Feb 2021 10:43)      SUBJECTIVE / OVERNIGHT EVENTS: surgical site pain improving. +bm. no cp/sob. no n/v/abd pain. no f/c/r.    MEDICATIONS  (STANDING):  heparin   Injectable 5000 Unit(s) SubCutaneous every 12 hours  multivitamin 1 Tablet(s) Oral daily  polyethylene glycol 3350 17 Gram(s) Oral daily  senna 2 Tablet(s) Oral at bedtime  sodium chloride 0.9%. 1000 milliLiter(s) (50 mL/Hr) IV Continuous <Continuous>  tamsulosin 0.4 milliGRAM(s) Oral daily    MEDICATIONS  (PRN):  acetaminophen   Tablet .. 650 milliGRAM(s) Oral every 6 hours PRN Temp greater or equal to 38C (100.4F), Mild Pain (1 - 3)  bisacodyl 5 milliGRAM(s) Oral every 12 hours PRN Constipation  cyclobenzaprine 10 milliGRAM(s) Oral three times a day PRN Muscle Spasm  melatonin 3 milliGRAM(s) Oral at bedtime PRN Insomnia  naloxone Injectable 0.1 milliGRAM(s) IV Push every 3 minutes PRN For ANY of the following changes in patient status:  A. RR LESS THAN 10 breaths per minute, B. Oxygen saturation LESS THAN 90%, C. Sedation score of 6  ondansetron Injectable 4 milliGRAM(s) IV Push every 6 hours PRN Nausea and/or Vomiting  oxyCODONE    IR 5 milliGRAM(s) Oral every 4 hours PRN Moderate Pain (4 - 6)  oxyCODONE    IR 10 milliGRAM(s) Oral every 4 hours PRN Severe Pain (7 - 10)      Vital Signs Last 24 Hrs  T(C): 36.7 (19 Feb 2021 09:35), Max: 36.9 (19 Feb 2021 04:57)  T(F): 98.1 (19 Feb 2021 09:35), Max: 98.4 (19 Feb 2021 04:57)  HR: 93 (19 Feb 2021 09:35) (81 - 93)  BP: 140/84 (19 Feb 2021 09:35) (95/61 - 140/84)  BP(mean): --  RR: 18 (19 Feb 2021 09:35) (18 - 18)  SpO2: 99% (19 Feb 2021 09:35) (95% - 99%)  CAPILLARY BLOOD GLUCOSE        I&O's Summary    18 Feb 2021 07:01  -  19 Feb 2021 07:00  --------------------------------------------------------  IN: 2292 mL / OUT: 1740 mL / NET: 552 mL    19 Feb 2021 07:01  -  19 Feb 2021 13:04  --------------------------------------------------------  IN: 724 mL / OUT: 0 mL / NET: 724 mL          PHYSICAL EXAM:    GENERAL: NAD   HEENT: EOMI, PERRL  PULM: Clear to auscultation bilaterally  CV: Regular rate and rhythm; nl S1, S2; No murmurs, rubs, or gallops  ABDOMEN: Soft, Nontender, Nondistended; Bowel sounds present  EXTREMITIES/MSK:  No edema, calf tenderness   PSYCH: AAOx3  NEUROLOGY: non-focal          LABS:                        10.1   8.36  )-----------( 437      ( 19 Feb 2021 10:10 )             32.1     02-19    142  |  107  |  23  ----------------------------<  95  4.0   |  26  |  1.33<H>    Ca    8.4      19 Feb 2021 10:10                  RADIOLOGY & ADDITIONAL TESTS:    Imaging Personally Reviewed:    Consultant(s) Notes Reviewed:      Care Discussed with Consultants/Other Providers: neurosurg regard ALEKSANDR monitoring at rehab

## 2021-02-19 NOTE — PROGRESS NOTE ADULT - SUBJECTIVE AND OBJECTIVE BOX
SUBJECTIVE[facility]    OVERNIGHT EVENTS: None    Vital Signs Last 24 Hrs  T(C): 36.7 (19 Feb 2021 09:35), Max: 36.9 (19 Feb 2021 04:57)  T(F): 98.1 (19 Feb 2021 09:35), Max: 98.4 (19 Feb 2021 04:57)  HR: 93 (19 Feb 2021 09:35) (59 - 101)  BP: 140/84 (19 Feb 2021 09:35) (95/61 - 153/77)  BP(mean): --  RR: 18 (19 Feb 2021 09:35) (18 - 18)  SpO2: 99% (19 Feb 2021 09:35) (95% - 100%)  IVF: [ ] IVL [ X] LR@50  DIET: [X ] Regular [ ] CCD [ ] Renal [ ] Puree [ ] Dysphagia [ ] Tube Feeds:   PCA: [ ] YES [X ] NO   DO: [X ] YES [ ] NO [ ] VOID   BM: [X ] YES-on 2/16    DRAINS: [ ] DENISE (cc/24h) [ X] HMV 75 (cc/24h)    PHYSICAL EXAM:    Constitutional: No Acute Distress     Neurological: AAOx2-3, Following Commands, PERRL, speech clear, hypophonic-better today, following commands, LLE HF 4+/5, otherwise 5/5.    Sensation: [X] intact to light touch  [] decreased:     Pulmonary: Clear to Auscultation, No rales, No rhonchi, No wheezes     Cardiovascular: S1, S2, Regular rate and rhythm     Gastrointestinal: Soft, Non-tender, Non-distended     Extremities: No calf tenderness     Incision: HMV active. [place]- on. +staples-CDI/Flat    LABS:                        11.1   10.41 )-----------( 434      ( 18 Feb 2021 06:32 )             35.6    02-18    140  |  106  |  21  ----------------------------<  87  4.4   |  23  |  1.37<H>    Ca    9.0      18 Feb 2021 06:32    COVID-19 PCR: NotDetec (14 Feb 2021 09:13)  COVID-19 PCR: NotDetec (10 Feb 2021 23:59)  COVID-19 PCR: NotDetec (08 Feb 2021 19:27)    IMAGING:  < from: CT Cervical Spine No Cont (02.16.21 @ 10:10) >  Redemonstrated C2 fracture extending from the base of the dens to the lateral masses with mild displacement with occiput to [id number] instrumentation and fusion. Bone graft placement noted. Dorsal soft tissue percutaneous drain is noted. Again noted slight bulging of the occiput fixation screws into the posterior fossa. The right C4 lateral mass screw partially encroachesthe right C4-C5 neuroforamen. No interval change.    No new fracture identified.    Retropharyngeal edema is noted throughout the cervical levels.    Included lung apices are clear.    IMPRESSION:  Mildly displaced C2 fracture extending into the lateral masses. Changes of occiput to C4 fusion. No significant interval change.    < from: US Kidney and Bladder (02.10.21 @ 17:14) >  Decrease in the dilatation of the left hydronephrosis. Mild left-sided hydronephrosis remains. No right-sided hydronephrosis.    The urinary bladder is decompressed by indwelling Do catheter. The urinary bladder wall appears thickened. Detailed evaluation of the bladder cannot be performed and should be considered with Do catheter clamped or removed.    Prostate gland cannot be evaluated.    < from: [facility] Duplex Lower Ext Vein Scan, Bilat (02.10.21 @ 15:41) >  No evidence of deep venous thrombosis in either lower extremity.    < from: Xray Pelvis 3 Views (02.09.21 @ 01:20) >  FINDINGS: There is a fracture of the left acetabulum which is better visualized on the CT scan. The joint spaces are intact bilaterally. No significant degenerative changes are present. The sacroiliac joint spaces appear unremarkable. No osseous lesion is seen. The overlying soft tissues appear unremarkable. Contrast is present in the urinary bladder.    IMPRESSION:  Fracture of the left acetabulum, better seen on the recent CT scan.    MEDICATIONS  (STANDING):  heparin   Injectable 5000 Unit(s) SubCutaneous every 12 hours  multivitamin 1 Tablet(s) Oral daily  polyethylene glycol 3350 17 Gram(s) Oral daily  senna 2 Tablet(s) Oral at bedtime  sodium chloride 0.9%. 1000 milliLiter(s) (50 mL/Hr) IV Continuous <Continuous>  tamsulosin 0.4 milliGRAM(s) Oral daily    MEDICATIONS  (PRN):  acetaminophen   Tablet .. 650 milliGRAM(s) Oral every 6 hours PRN Temp greater or equal to 38C (100.4F), Mild Pain (1 - 3)  bisacodyl 5 milliGRAM(s) Oral every 12 hours PRN Constipation  cyclobenzaprine 10 milliGRAM(s) Oral three times a day PRN Muscle Spasm  melatonin 3 milliGRAM(s) Oral at bedtime PRN Insomnia  naloxone Injectable 0.1 milliGRAM(s) IV Push every 3 minutes PRN For ANY of the following changes in patient status:  A. RR LESS THAN 10 breaths per minute, B. Oxygen saturation LESS THAN 90%, C. Sedation score of 6  ondansetron Injectable 4 milliGRAM(s) IV Push every 6 hours PRN Nausea and/or Vomiting  oxyCODONE    IR 5 milliGRAM(s) Oral every 4 hours PRN Moderate Pain (4 - 6)  oxyCODONE    IR 10 milliGRAM(s) Oral every 4 hours PRN Severe Pain (7 - 10)

## 2021-02-19 NOTE — PROGRESS NOTE ADULT - PROBLEM SELECTOR PLAN 3
cont Lorenzo and Flomax. apprec Urology input.
cont Lorenzo and Flomax. apprec Urology input. consider TOV if ok with Urology
cont Lorenzo and Flomax. apprec Urology input. consider TOV if ok with Urology
cont Lorenzo and Flomax. apprec Urology input.
cont Lorenzo and Flomax. apprec Urology input. check f/u renal US for hydronephrosis
cont Lorenzo and Flomax. apprec Urology input.
cont Lorenzo and Flomax. apprec Urology input.
cont Lorenzo and Flomax. apprec Urology input. consider TOV if ok with Urology

## 2021-02-19 NOTE — PROGRESS NOTE ADULT - ASSESSMENT
Cervical spine fx  Dens fx  s/p fusion   fu with nsx   pain control     HTN  stable     CKD  as per med     DVT proph  on lovenox

## 2021-02-19 NOTE — PROGRESS NOTE ADULT - SUBJECTIVE AND OBJECTIVE BOX
patient in bed, ate breakfast, pain controlled     REVIEW OF SYSTEMS  Constitutional - No fever,  No fatigue  HEENT - No vertigo, + neck pain  Neurological - No headaches, No memory loss, No loss of strength, No numbness, No tremors  Skin - No rashes, No lesions   Musculoskeletal - No joint pain, No joint swelling, No muscle pain  Psychiatric - No depression, No anxiety    FUNCTIONAL PROGRESS  2/18 PT  bed mobility mod assist  transfers min assist, RW  gait min assist, RW x 10 feet  requiring increased time, instruction for sequencing    2/18 OT  bed mobility mod assist  transfers min assist, RW  poor dynamic standing balance  LB dressing max assist  increased processing time, redirection to task     VITALS  T(C): 36.7 (02-19-21 @ 09:35), Max: 36.9 (02-19-21 @ 04:57)  HR: 93 (02-19-21 @ 09:35) (59 - 101)  BP: 140/84 (02-19-21 @ 09:35) (95/61 - 153/77)  RR: 18 (02-19-21 @ 09:35) (18 - 18)  SpO2: 99% (02-19-21 @ 09:35) (95% - 100%)  Wt(kg): --    MEDICATIONS   acetaminophen   Tablet .. 650 milliGRAM(s) every 6 hours PRN  bisacodyl 5 milliGRAM(s) every 12 hours PRN  cyclobenzaprine 10 milliGRAM(s) three times a day PRN  heparin   Injectable 5000 Unit(s) every 12 hours  melatonin 3 milliGRAM(s) at bedtime PRN  multivitamin 1 Tablet(s) daily  naloxone Injectable 0.1 milliGRAM(s) every 3 minutes PRN  ondansetron Injectable 4 milliGRAM(s) every 6 hours PRN  oxyCODONE    IR 5 milliGRAM(s) every 4 hours PRN  oxyCODONE    IR 10 milliGRAM(s) every 4 hours PRN  polyethylene glycol 3350 17 Gram(s) daily  senna 2 Tablet(s) at bedtime  sodium chloride 0.9%. 1000 milliLiter(s) <Continuous>  tamsulosin 0.4 milliGRAM(s) daily      RECENT LABS - Reviewed                        10.1   8.36  )-----------( 437      ( 19 Feb 2021 10:10 )             32.1     02-18    140  |  106  |  21  ----------------------------<  87  4.4   |  23  |  1.37<H>    Ca    9.0      18 Feb 2021 06:32    --------------------------------------------------------------------------------------  PHYSICAL EXAM  Constitutional - NAD, Comfortable, in bed  +cervical collar  Chest - Breathing comfortably, No wheezing  Cardiovascular - S1S2   Abdomen - Soft   Extremities - No C/C/E, No calf tenderness   Neurologic Exam -                    Cognitive - Awake, Alert, AAO to self, place, date, year, situation     Communication - Fluent, No dysarthria        Motor - moves all ext, 5/5       Sensory - Intact to LT     Psychiatric - Mood stable, Affect WNL  ----------------------------------------------------------------------------------------  ASSESSMENT/PLAN  76 year old man with functional deficits after fall down stairs at home, s/p Occ-C4 posterior arthrodesis for dens fracture   left inferior and high superior pubic rami/ant column non displaced fractures, WBAT LLE   ALEKSANDR, urinary retention, hydronephrosis improved, flomax, dutton  Pain - Tylenol, oxycodone, flexeril prn  DVT PPX - SCDs, lovenox   Rehab - Will continue to follow for ongoing rehab needs and recommendations.   OT/PT: min assist with transfers gait  continue bedside therapy, out of bed to chair daily    Recommend ACUTE inpatient rehabilitation for the functional deficits consisting of 3 hours of therapy/day & 24 hour RN/daily PMR physician for comorbid medical management. Patient will be able to tolerate 3 hours a day.

## 2021-02-19 NOTE — PROGRESS NOTE ADULT - ASSESSMENT
Patient is a 75y old  Male who presents with a chief complaint of fall     HPI: 75 M with no PMH presents s/p mechanical fall on Thursday when he slipped down stairs, landed on his back. He hit his head and had some bleeding however no loss of consciousness. Since then he has had difficulty ambulating. He has been having pain in the neck, chest wall, and L hip. Patient is not on any blood thinners. No fevers, chills, chest pain, shortness of breath. No n/v/diarrhea.    Of note, patient found to have L hydroureteronephrosis as well as distended bladder on imaging. Patient denies any difficulty urinating however has not urinated since ED admission. He denies any dysuria or hematuria however has been having increased urinary frequency.     (09 Feb 2021 03:45)    PROCEDURE: Adm 2/9. 2/19 Occip to C4 fusion    POD#4    PLAN:  Neuro: 2/19 HMV DC'd this AM. Per urology note-DC dutton when more active/OOB and if Cr improves. CBC, BMP-P FU. ALEKSANDR-2/18 SQL DC'd and start SQH. Cont IVF for ALEKSANDR, [place] J on at all times. 2/19 Nylon sutures x 3 DC'd.  Follow-up with Dr. Strickland in the office in 2 weeks; call office for appointment. Inc activity/OOB.     Cardio note of 2/1857-XUG-[id number]. CKD as per med. DVT proph-on lovenox. Electronic Signatures: Omari Garrison)    Medicine note of 2/18-·  Problem: ALEKSANDR (acute kidney injury).  Plan: stable. may have risen slightly bec pt has sig more output than input recently. will start low IVF. Called pt's GI, Dr. Nick for past bloodwork as this was the last doctor pt saw, but last visit was 2 years ago and blood work was not done. -closely monitor urine output / BUN/CR. -pt aware of need for close outpt f/u and no NSAIDsProblem: Urinary retention.  Plan: cont Dutton and Flomax. apprec Urology input. consider TOV if ok with Urology.   Problem: Hydronephrosis of left kidney.  Plan: f/u renal US shows improved left hydro. will need outpt f/u- d/w pt. Problem: HTN (hypertension).  Plan: BP is stable. pain control. Problem: Tremor. Plan: Fine resting tremor Lt >RT upper extremity  which is not new as per patient outpt neuro eval for Parkinsons d/w pt. Electronic Signatures: Colton Coffye)    Urology note of 2/11-74 yo male with urinary retention of 1800cc, bl hydronephrosis - right mld and left severe, enlarged prostate with median lobe, ALEKSANDR upon presentation after fall.  - Continue flomax. - ALEKSANDR resolving, continue to trend. - Keep dutton until Cr nadirs and patient is at baseline ambulatory status. - Avoid. narcotics/constipation, bowel regimen to ensure soft stools (senna/colace/miralax prn) - Trial of Void per primary team, ensure post-void residual obtained  - Consider renal u/s f/u imaging after ALEKSANDR/POD resolves Electronic Signatures: Rigo Rios)     Ortho note of [birth year]/9-75y Male with left inferior and high superior pubic rami/ant column non displaced fractures. Plan: No acute orthopedic surgical intervention indicated at this time. FU NSx, pt with dens Fxflashley operative management per Neurosurgery, In C Collar  WBAT LLE, assistive devices as needed Pain control. Ice application.  DVT prophylaxis. Follow-up with Dr. Strickland in the office in 2 weeks; call office for appointment. ortho stable will d/w attending, advise with any changes Attending Attestation: Patient examined. Chart and X-rays reviewed. Agree with above note. Rico Strickland MD .Electronic Signatures: Pk Mejia)  (Signed     Respiratory: Patient instructed to use incentive spirometer [ X] YES [ ] NO              DVT ppx: [ ] SQL [ X] SQH and Venodynes [ ] Left [ ] Right [ X] Bilateral    Discharge Planning:  The patient was evaluated by PT/PMR and recommended Acute Rehab.       More than 30 minutes spent on total encounter: more than 50% of the visit was spent on educating the patient and family regarding condition, medications, follow up plans, signs and symptoms to be concerned with, preparing paperwork, and questions answered regarding discharge.       Patient is a 75y old  Male who presents with a chief complaint of fall     HPI: 75 M with no PMH presents s/p mechanical fall on Thursday when he slipped down stairs, landed on his back. He hit his head and had some bleeding however no loss of consciousness. Since then he has had difficulty ambulating. He has been having pain in the neck, chest wall, and L hip. Patient is not on any blood thinners. No fevers, chills, chest pain, shortness of breath. No n/v/diarrhea.    Of note, patient found to have L hydroureteronephrosis as well as distended bladder on imaging. Patient denies any difficulty urinating however has not urinated since ED admission. He denies any dysuria or hematuria however has been having increased urinary frequency.     (09 Feb 2021 03:45)    PROCEDURE: Adm 2/9. 2/19 Occip to C4 fusion    POD#4    PLAN:  Neuro: 2/19 HMV DC'd this AM. Per urology note-DC dutton when more active/OOB and if Cr improves. CBC, BMP-P FU. ALEKSANDR-2/18 SQL DC'd and start SQH. Cont IVF for ALEKSANDR, [place] J on at all times. 2/19 Nylon sutures x 3 DC'd.  Follow-up with Dr. Strickland in the office in 2 weeks; call office for appointment. Inc activity/OOB. Rehab once bed available. 2/19 Covid PCR sent FU.    Cardio note of 2/1896-SHE-[id number]. CKD as per med. DVT proph-on lovenox. Electronic Signatures: Omari Garrison)    Medicine note of 2/18-·  Problem: ALEKSANDR (acute kidney injury).  Plan: stable. may have risen slightly bec pt has sig more output than input recently. will start low IVF. Called pt's GI, Dr. Nick for past bloodwork as this was the last doctor pt saw, but last visit was 2 years ago and blood work was not done. -closely monitor urine output / BUN/CR. -pt aware of need for close outpt f/u and no NSAIDsProblem: Urinary retention.  Plan: cont Dutton and Flomax. apprec Urology input. consider TOV if ok with Urology.   Problem: Hydronephrosis of left kidney.  Plan: f/u renal US shows improved left hydro. will need outpt f/u- d/w pt. Problem: HTN (hypertension).  Plan: BP is stable. pain control. Problem: Tremor. Plan: Fine resting tremor Lt >RT upper extremity  which is not new as per patient outpt neuro eval for Parkinsons d/w pt. Electronic Signatures: Colton Coffey)    Urology note of 2/11-76 yo male with urinary retention of 1800cc, bl hydronephrosis - right mld and left severe, enlarged prostate with median lobe, ALEKSANDR upon presentation after fall.  - Continue flomax. - ALEKSANDR resolving, continue to trend. - Keep dutton until Cr nadirs and patient is at baseline ambulatory status. - Avoid. narcotics/constipation, bowel regimen to ensure soft stools (senna/colace/miralax prn) - Trial of Void per primary team, ensure post-void residual obtained  - Consider renal u/s f/u imaging after ALEKSANDR/POD resolves Electronic Signatures: Rigo Rios (MD)     Ortho note of 2/9-75y Male with left inferior and high superior pubic rami/ant column non displaced fractures. Plan: No acute orthopedic surgical intervention indicated at this time. FU NSx, pt with rossy Mccrary operative management per Neurosurgery, In C Collar  WBAT LLE, assistive devices as needed Pain control. Ice application.  DVT prophylaxis. Follow-up with Dr. Strickland in the office in 2 weeks; call office for appointment. ortho stable will d/w attending, advise with any changes Attending Attestation: Patient examined. Chart and X-rays reviewed. Agree with above note. Rico Strickland MD .Electronic Signatures: Pk Mejia)  (Signed     Respiratory: Patient instructed to use incentive spirometer [ X] YES [ ] NO              DVT ppx: [ ] SQL [ X] SQH and Venodynes [ ] Left [ ] Right [ X] Bilateral    Discharge Planning:  The patient was evaluated by PT/PMR and recommended Acute Rehab.       More than 30 minutes spent on total encounter: more than 50% of the visit was spent on educating the patient and family regarding condition, medications, follow up plans, signs and symptoms to be concerned with, preparing paperwork, and questions answered regarding discharge.

## 2021-02-19 NOTE — PROGRESS NOTE ADULT - PROBLEM SELECTOR PROBLEM 3
Urinary retention

## 2021-02-19 NOTE — DISCHARGE NOTE PROVIDER - NSDCFUADDINST_GEN_ALL_CORE_FT
DC staples POD!4 if incision looks well, FU Creatinine in 2days.  DC Lorenzo to TOV once more active on 2/20  [place] J Collar on at all times  Followup with your Private MD in 1-2 weeks.  Return to Emergency Department or contact your Neurosurgeon if any changes in mental status, weakness, numbness or tingling of extremities; difficulty swallowing; drainage or redness of wound, fever; pain in legs; difficulty urinating or constipation.  Donot restart your Aspirin or take any Motrin/NSAIDS until checking with your Neurosurgeon.  No strenous activity. No heavy lifting. Do not return to work until cleared by physician. No driving until cleared by physician.  Remove dressing on 3rd day after your surgery and leave incision open to air. You may shower on the 3rd day after you surgery.  No soaking in tub,  Donot remove steri strips if present.  Donot apply any ointements to incision.

## 2021-02-19 NOTE — PROGRESS NOTE ADULT - PROBLEM SELECTOR PROBLEM 4
Hydronephrosis of left kidney

## 2021-02-19 NOTE — PROGRESS NOTE ADULT - THIS PATIENT HAS THE FOLLOWING CONDITION(S)/DIAGNOSES ON THIS ADMISSION:
None
Renal Disease
None
None
Renal Disease
Renal Disease
Renal Disease/Acute Blood Loss Anemia
Renal Disease/Acute Blood Loss Anemia

## 2021-02-19 NOTE — DISCHARGE NOTE PROVIDER - NSDCCPCAREPLAN_GEN_ALL_CORE_FT
PRINCIPAL DISCHARGE DIAGNOSIS  Diagnosis: Closed odontoid fracture, initial encounter  Assessment and Plan of Treatment:       SECONDARY DISCHARGE DIAGNOSES  Diagnosis: ALEKSANDR (acute kidney injury)  Assessment and Plan of Treatment:     Diagnosis: Hydronephrosis  Assessment and Plan of Treatment:

## 2021-02-19 NOTE — DISCHARGE NOTE PROVIDER - NSDCCPTREATMENT_GEN_ALL_CORE_FT
PRINCIPAL PROCEDURE  Procedure: Arthrodesis, cervical spine, below C2, posterior technique  Findings and Treatment:

## 2021-02-19 NOTE — PROGRESS NOTE ADULT - PROVIDER SPECIALTY LIST ADULT
Anesthesia
Cardiology
Hospitalist
Neurosurgery
Neurosurgery
Rehab Medicine
Cardiology
Hospitalist
Urology
Anesthesia
Cardiology
Cardiology
Neurosurgery
Hospitalist
Hospitalist

## 2021-02-19 NOTE — DISCHARGE NOTE PROVIDER - DETAILS OF MALNUTRITION DIAGNOSIS/DIAGNOSES
This patient has been assessed with a concern for Malnutrition and was treated during this hospitalization for the following Nutrition diagnosis/diagnoses:     -  02/12/2021: Underweight (BMI < 19)

## 2021-02-19 NOTE — PROGRESS NOTE ADULT - SUBJECTIVE AND OBJECTIVE BOX
DATE OF SERVICE: 02-19-21 @ 09:41    Subjective: Patient seen and examined. No new events except as noted.     SUBJECTIVE/ROS:        MEDICATIONS:  MEDICATIONS  (STANDING):  heparin   Injectable 5000 Unit(s) SubCutaneous every 12 hours  multivitamin 1 Tablet(s) Oral daily  polyethylene glycol 3350 17 Gram(s) Oral daily  senna 2 Tablet(s) Oral at bedtime  sodium chloride 0.9%. 1000 milliLiter(s) (50 mL/Hr) IV Continuous <Continuous>  tamsulosin 0.4 milliGRAM(s) Oral daily      PHYSICAL EXAM:  T(C): 36.7 (02-19-21 @ 09:35), Max: 36.9 (02-19-21 @ 04:57)  HR: 93 (02-19-21 @ 09:35) (59 - 101)  BP: 140/84 (02-19-21 @ 09:35) (95/61 - 153/77)  RR: 18 (02-19-21 @ 09:35) (18 - 18)  SpO2: 99% (02-19-21 @ 09:35) (95% - 100%)  Wt(kg): --  I&O's Summary    18 Feb 2021 07:01  -  19 Feb 2021 07:00  --------------------------------------------------------  IN: 2292 mL / OUT: 1740 mL / NET: 552 mL            JVP: Normal  Neck: supple  Lung: clear   CV: S1 S2 , Murmur:  Abd: soft  Ext: No edema  neuro: Awake / alert  Psych: flat affect  Skin: normal``    LABS/DATA:    CARDIAC MARKERS:                                11.1   10.41 )-----------( 434      ( 18 Feb 2021 06:32 )             35.6     02-18    140  |  106  |  21  ----------------------------<  87  4.4   |  23  |  1.37<H>    Ca    9.0      18 Feb 2021 06:32      proBNP:   Lipid Profile:   HgA1c:   TSH:     TELE:  EKG:

## 2021-02-19 NOTE — DISCHARGE NOTE PROVIDER - CARE PROVIDER_API CALL
Santy Dove (DO)  Neurosurgery  [address], [address]  [place] [address]  Phone: [phone]  Fax: [phone]  Follow Up Time:     Rico Strickland  ORTHOPAEDIC SURGERY  [address], [address]  [place] [address]  Phone: [phone]  Fax: [phone]  Follow Up Time:

## 2021-02-19 NOTE — PROGRESS NOTE ADULT - PROBLEM SELECTOR PROBLEM 1
Odontoid fracture

## 2021-03-16 PROCEDURE — 93970 EXTREMITY STUDY: CPT

## 2021-03-16 PROCEDURE — 83735 ASSAY OF MAGNESIUM: CPT

## 2021-03-16 PROCEDURE — 96375 TX/PRO/DX INJ NEW DRUG ADDON: CPT | Mod: XU

## 2021-03-16 PROCEDURE — 71045 X-RAY EXAM CHEST 1 VIEW: CPT

## 2021-03-16 PROCEDURE — 82570 ASSAY OF URINE CREATININE: CPT

## 2021-03-16 PROCEDURE — 97116 GAIT TRAINING THERAPY: CPT

## 2021-03-16 PROCEDURE — 85520 HEPARIN ASSAY: CPT

## 2021-03-16 PROCEDURE — 87086 URINE CULTURE/COLONY COUNT: CPT

## 2021-03-16 PROCEDURE — 85610 PROTHROMBIN TIME: CPT

## 2021-03-16 PROCEDURE — 72125 CT NECK SPINE W/O DYE: CPT

## 2021-03-16 PROCEDURE — 90715 TDAP VACCINE 7 YRS/> IM: CPT

## 2021-03-16 PROCEDURE — 87641 MR-STAPH DNA AMP PROBE: CPT

## 2021-03-16 PROCEDURE — 85027 COMPLETE CBC AUTOMATED: CPT

## 2021-03-16 PROCEDURE — 97110 THERAPEUTIC EXERCISES: CPT

## 2021-03-16 PROCEDURE — 84300 ASSAY OF URINE SODIUM: CPT

## 2021-03-16 PROCEDURE — 81001 URINALYSIS AUTO W/SCOPE: CPT

## 2021-03-16 PROCEDURE — 86850 RBC ANTIBODY SCREEN: CPT

## 2021-03-16 PROCEDURE — 97166 OT EVAL MOD COMPLEX 45 MIN: CPT

## 2021-03-16 PROCEDURE — 82553 CREATINE MB FRACTION: CPT

## 2021-03-16 PROCEDURE — 84100 ASSAY OF PHOSPHORUS: CPT

## 2021-03-16 PROCEDURE — 71260 CT THORAX DX C+: CPT

## 2021-03-16 PROCEDURE — 72141 MRI NECK SPINE W/O DYE: CPT

## 2021-03-16 PROCEDURE — 86769 SARS-COV-2 COVID-19 ANTIBODY: CPT

## 2021-03-16 PROCEDURE — 74177 CT ABD & PELVIS W/CONTRAST: CPT

## 2021-03-16 PROCEDURE — 72170 X-RAY EXAM OF PELVIS: CPT

## 2021-03-16 PROCEDURE — C1713: CPT

## 2021-03-16 PROCEDURE — 76770 US EXAM ABDO BACK WALL COMP: CPT

## 2021-03-16 PROCEDURE — 96374 THER/PROPH/DIAG INJ IV PUSH: CPT | Mod: XU

## 2021-03-16 PROCEDURE — 86901 BLOOD TYPING SEROLOGIC RH(D): CPT

## 2021-03-16 PROCEDURE — 73502 X-RAY EXAM HIP UNI 2-3 VIEWS: CPT

## 2021-03-16 PROCEDURE — 87640 STAPH A DNA AMP PROBE: CPT

## 2021-03-16 PROCEDURE — C1889: CPT

## 2021-03-16 PROCEDURE — 80053 COMPREHEN METABOLIC PANEL: CPT

## 2021-03-16 PROCEDURE — 99285 EMERGENCY DEPT VISIT HI MDM: CPT | Mod: 25

## 2021-03-16 PROCEDURE — 72148 MRI LUMBAR SPINE W/O DYE: CPT

## 2021-03-16 PROCEDURE — 72190 X-RAY EXAM OF PELVIS: CPT

## 2021-03-16 PROCEDURE — U0003: CPT

## 2021-03-16 PROCEDURE — 72146 MRI CHEST SPINE W/O DYE: CPT

## 2021-03-16 PROCEDURE — 90471 IMMUNIZATION ADMIN: CPT

## 2021-03-16 PROCEDURE — 85025 COMPLETE CBC W/AUTO DIFF WBC: CPT

## 2021-03-16 PROCEDURE — 51702 INSERT TEMP BLADDER CATH: CPT

## 2021-03-16 PROCEDURE — 84484 ASSAY OF TROPONIN QUANT: CPT

## 2021-03-16 PROCEDURE — 80048 BASIC METABOLIC PNL TOTAL CA: CPT

## 2021-03-16 PROCEDURE — 97162 PT EVAL MOD COMPLEX 30 MIN: CPT

## 2021-03-16 PROCEDURE — 70498 CT ANGIOGRAPHY NECK: CPT

## 2021-03-16 PROCEDURE — 93005 ELECTROCARDIOGRAM TRACING: CPT

## 2021-03-16 PROCEDURE — U0005: CPT

## 2021-03-16 PROCEDURE — 76000 FLUOROSCOPY <1 HR PHYS/QHP: CPT

## 2021-03-16 PROCEDURE — 82550 ASSAY OF CK (CPK): CPT

## 2021-03-16 PROCEDURE — 97530 THERAPEUTIC ACTIVITIES: CPT

## 2021-03-16 PROCEDURE — 73552 X-RAY EXAM OF FEMUR 2/>: CPT

## 2021-03-16 PROCEDURE — 85730 THROMBOPLASTIN TIME PARTIAL: CPT

## 2021-03-16 PROCEDURE — 86900 BLOOD TYPING SEROLOGIC ABO: CPT

## 2021-05-06 ENCOUNTER — OUTPATIENT (OUTPATIENT)
Dept: OUTPATIENT SERVICES | Facility: HOSPITAL | Age: 76
LOS: 1 days | End: 2021-05-06
Payer: COMMERCIAL

## 2021-05-06 ENCOUNTER — APPOINTMENT (OUTPATIENT)
Dept: RADIOLOGY | Facility: CLINIC | Age: 76
End: 2021-05-06
Payer: MEDICARE

## 2021-05-06 ENCOUNTER — APPOINTMENT (OUTPATIENT)
Dept: SPINE | Facility: CLINIC | Age: 76
End: 2021-05-06
Payer: MEDICARE

## 2021-05-06 VITALS
HEART RATE: 68 BPM | RESPIRATION RATE: 18 BRPM | WEIGHT: 170 LBS | TEMPERATURE: 97.1 F | HEIGHT: 70 IN | SYSTOLIC BLOOD PRESSURE: 108 MMHG | DIASTOLIC BLOOD PRESSURE: 68 MMHG | BODY MASS INDEX: 24.34 KG/M2

## 2021-05-06 DIAGNOSIS — S12.110A ANTERIOR DISPLACED TYPE II DENS FRACTURE, INITIAL ENCOUNTER FOR CLOSED FRACTURE: ICD-10-CM

## 2021-05-06 DIAGNOSIS — Z09 ENCOUNTER FOR FOLLOW-UP EXAMINATION AFTER COMPLETED TREATMENT FOR CONDITIONS OTHER THAN MALIGNANT NEOPLASM: ICD-10-CM

## 2021-05-06 PROCEDURE — 72040 X-RAY EXAM NECK SPINE 2-3 VW: CPT | Mod: 26

## 2021-05-06 PROCEDURE — 99024 POSTOP FOLLOW-UP VISIT: CPT

## 2021-05-06 PROCEDURE — 72040 X-RAY EXAM NECK SPINE 2-3 VW: CPT

## 2021-05-06 NOTE — HISTORY OF PRESENT ILLNESS
[FreeTextEntry1] : Hospital Course: \par Discharge Date	19-Feb-2021 \par Admission Date	09-Feb-2021 00:34 \par Hospital Course	 \par Patient is a 75y old  Male who presents with a chief complaint of fall \par \par HPI: 75 M with no PMH presents s/p mechanical fall on Thursday when he slipped \par down stairs, landed on his back. He hit his head and had some bleeding however \par no loss of consciousness. Since then he has had difficulty ambulating. He has \par been having pain in the neck, chest wall, and L hip. Patient is not on any \par blood thinners. No fevers, chills, chest pain, shortness of breath. No \par n/v/diarrhea. \par \par Of note, patient found to have L hydroureteronephrosis as well as distended \par bladder on imaging. Patient denies any difficulty urinating however has not \par urinated since ED admission. He denies any dysuria or hematuria however has \par been having increased urinary frequency. \par  (09 Feb 2021 03:45) \par \par PROCEDURE: Adm 2/9. 2/19 Occip to C4 fusion \par \par POD#4 \par \par PLAN: \par Neuro: 2/19 HMV DC'd this AM. Per urology note-DC dutton when more active/OOB \par and if Cr improves. 2/19 Crt improving and should be rechecked at rehab 1-2 \par days.  ALEKSANDR-2/18 SQL DC'd and start SQH. Cont IVF for ALEKSANDR, [place] J on at all \par times. 2/19 Nylon sutures x 3 DC'd.  Follow-up with Dr. Strickland in the office \par in 2 weeks; call office for appointment. Inc activity/OOB. Rehab once bed \par available. 2/19 Covid PCR sent FU. \par \par Cardio note of 2/1861-[id number]-[id number]. CKD as per med. DVT proph-on lovenox. \par Electronic Signatures: Omari Garrison) \par \par Medicine note of 2/18--  Problem: ALEKSANDR (acute kidney injury).  Plan: stable. may \par have risen slightly bec pt has sig more output than input recently. will start \par low IVF. Called pt's GI, Dr. Nick for past bloodwork as this was the last \par doctor pt saw, but last visit was 2 years ago and blood work was not done. \par -closely monitor urine output / BUN/CR. -pt aware of need for close outpt f/u \par and no NSAIDsProblem: Urinary retention.  Plan: cont Dutton and Flomax. apprec \par Urology input. consider TOV if ok with Urology. Problem: Hydronephrosis of \par left kidney.  Plan: f/u renal US shows improved left hydro. will need outpt \par f/u- d/w pt. Problem: HTN (hypertension).  Plan: BP is stable. pain control. \par Problem: Tremor. Plan: Fine resting tremor Lt >RT upper extremity  which is not \par new as per patient outpt neuro eval for Parkinsons d/w pt. Electronic \par Signatures: Colton Coffey) \par \par Urology note of 2/11-74 yo male with urinary retention of 1800cc, bl \par hydronephrosis - right mld and left severe, enlarged prostate with median lobe, \par ALEKSANDR upon presentation after fall.  - Continue flomax. - ALEKSANDR resolving, continue \par to trend. - Keep dutton until Cr nadirs and patient is at baseline ambulatory \par status. - Avoid. narcotics/constipation, bowel regimen to ensure soft stools \par (senna/colace/miralax prn) - Trial of Void per primary team, ensure post-void \par residual obtained. - Consider renal u/s f/u imaging after ALEKSANDR/POD resolves \par Electronic Signatures: Rigo Rios) \par \par Ortho note of 2/9-75y Male with left inferior and high superior pubic rami/ant \par column non displaced fractures. Plan: No acute orthopedic surgical intervention \par indicated at this time. FU NSx, pt with dens Fx, likley operative management \par per Neurosurgery, In C Collar  WBAT LLE, assistive devices as needed Pain \par control. Ice application. \par DVT prophylaxis. Follow-up with Dr. Strickland in the office in 2 weeks; call \par office for appointment. ortho stable will d/w attending, advise with any \par changes Attending Attestation: Patient examined. Chart and X-rays reviewed. \par Agree with above note. Rico Strickland MD .Electronic Signatures: \par Pk Mejia (MD)  (Signed \par \par Respiratory: Patient instructed to use incentive spirometer [ X] YES [ ] NO \par \par DVT ppx: [ ] SQL [ X] SQH and Venodynes [ ] Left [ ] Right [ X] Bilateral \par \par Discharge Planning:  The patient was evaluated by PT/PMR and recommended Acute \par Rehab. \par \par

## 2021-05-06 NOTE — PHYSICAL EXAM
[General Appearance - Alert] : alert [General Appearance - In No Acute Distress] : in no acute distress [Oriented To Time, Place, And Person] : oriented to person, place, and time [Impaired Insight] : insight and judgment were intact [Cranial Nerves Optic (II)] : visual acuity intact bilaterally,  pupils equal round and reactive to light [Cranial Nerves Oculomotor (III)] : extraocular motion intact [Cranial Nerves Trigeminal (V)] : facial sensation intact symmetrically [Cranial Nerves Facial (VII)] : face symmetrical [Cranial Nerves Vestibulocochlear (VIII)] : hearing was intact bilaterally [Cranial Nerves Glossopharyngeal (IX)] : tongue and palate midline [Cranial Nerves Accessory (XI - Cranial And Spinal)] : head turning and shoulder shrug symmetric [Cranial Nerves Hypoglossal (XII)] : there was no tongue deviation with protrusion [No Visual Abnormalities] : no visible abnormalities [Outer Ear] : the ears and nose were normal in appearance [] : no respiratory distress [Heart Rate And Rhythm] : heart rate was normal and rhythm regular [Abdomen Soft] : soft [Abnormal Walk] : normal gait [Skin Color & Pigmentation] : normal skin color and pigmentation

## 2021-05-06 NOTE — ASSESSMENT
[FreeTextEntry1] : 76 year old man S/P Cervical fusion; Posterior cervical incision well healed\par \par Continue to wear cervical collar with ambulation\par \par Follow up with Xray Cervical spine\par \par Follow up with Urologist

## 2021-05-06 NOTE — REASON FOR VISIT
[de-identified] : S/P Occiput to C4 posterior segmental instrumentation and posterolateral arthrodesis. [de-identified] : 2/15/21 [de-identified] : Today he is doing well\par He was discharged  from Rehab  a few days. He is doing well.\par Posterior neck incision well healed. He was not wearing cervical collar. Pt instructed to wear collar to maintain alignment and promote bone healing.\par He has a urinary catheter in place. Amy urine. He is under the care of urologist.\par  [Family Member] : family member

## 2021-05-18 ENCOUNTER — APPOINTMENT (OUTPATIENT)
Dept: SPINE | Facility: CLINIC | Age: 76
End: 2021-05-18

## 2021-06-09 ENCOUNTER — OUTPATIENT (OUTPATIENT)
Dept: OUTPATIENT SERVICES | Facility: HOSPITAL | Age: 76
LOS: 1 days | End: 2021-06-09
Payer: COMMERCIAL

## 2021-06-09 ENCOUNTER — APPOINTMENT (OUTPATIENT)
Dept: MRI IMAGING | Facility: CLINIC | Age: 76
End: 2021-06-09
Payer: MEDICARE

## 2021-06-09 DIAGNOSIS — Z00.8 ENCOUNTER FOR OTHER GENERAL EXAMINATION: ICD-10-CM

## 2021-06-09 PROCEDURE — 70551 MRI BRAIN STEM W/O DYE: CPT

## 2021-06-09 PROCEDURE — 70551 MRI BRAIN STEM W/O DYE: CPT | Mod: 26

## 2021-06-22 ENCOUNTER — APPOINTMENT (OUTPATIENT)
Dept: SPINE | Facility: CLINIC | Age: 76
End: 2021-06-22
Payer: MEDICARE

## 2021-06-22 VITALS
SYSTOLIC BLOOD PRESSURE: 147 MMHG | BODY MASS INDEX: 19.18 KG/M2 | HEART RATE: 77 BPM | OXYGEN SATURATION: 98 % | WEIGHT: 134 LBS | DIASTOLIC BLOOD PRESSURE: 76 MMHG | HEIGHT: 70 IN

## 2021-06-22 PROCEDURE — 99211 OFF/OP EST MAY X REQ PHY/QHP: CPT

## 2021-06-23 NOTE — ASSESSMENT
[FreeTextEntry1] : 76 year old man S/P Posterior Cervical Fusion\par  Cervical Xrays shows Intact hardware construct with good alignment.\par Continue care under urologist.\par Follow-up in September 2021 new x-ray

## 2021-06-23 NOTE — PHYSICAL EXAM
[General Appearance - Alert] : alert [General Appearance - In No Acute Distress] : in no acute distress [Oriented To Time, Place, And Person] : oriented to person, place, and time [Impaired Insight] : insight and judgment were intact [Cranial Nerves Optic (II)] : visual acuity intact bilaterally,  pupils equal round and reactive to light [Cranial Nerves Oculomotor (III)] : extraocular motion intact [Cranial Nerves Trigeminal (V)] : facial sensation intact symmetrically [Cranial Nerves Facial (VII)] : face symmetrical [Cranial Nerves Vestibulocochlear (VIII)] : hearing was intact bilaterally [Cranial Nerves Glossopharyngeal (IX)] : tongue and palate midline [Cranial Nerves Accessory (XI - Cranial And Spinal)] : head turning and shoulder shrug symmetric [Cranial Nerves Hypoglossal (XII)] : there was no tongue deviation with protrusion [] : no respiratory distress [Heart Rate And Rhythm] : heart rate was normal and rhythm regular [Abnormal Walk] : normal gait

## 2021-06-23 NOTE — END OF VISIT
[FreeTextEntry2] : I, Dr. Santy Dove, evaluated the patient with the nurse practitioner Alexis Maravilla and established the plan of care. I personally discuss this patient with the nurse practitioner at the time of the visit. I agree with the assessment and plan as written, unless noted below.\par \par

## 2021-06-23 NOTE — REASON FOR VISIT
[Follow-Up: _____] : a [unfilled] follow-up visit [FreeTextEntry1] : Doing well post posterior cervical fusion\par Presently and denies any pain. \par He remains under the care of his urologist for his history of hydroureteronephrosis and distended bladder.  The Lorenzo catheter remains in place.

## 2021-06-23 NOTE — REVIEW OF SYSTEMS
[As Noted in HPI] : as noted in HPI [Negative] : Musculoskeletal [FreeTextEntry8] : Lorenzo catheter in place

## 2022-04-19 NOTE — ED PROVIDER NOTE - NS ED MD DISPO DISCHARGE
Home Consent 3/Introductory Paragraph: I gave the patient a chance to ask questions they had about the procedure.  Following this I explained the Mohs procedure and consent was obtained. The risks, benefits and alternatives to therapy were discussed in detail. Specifically, the risks of infection, scarring, bleeding, prolonged wound healing, incomplete removal, allergy to anesthesia, nerve injury and recurrence were addressed. Prior to the procedure, the treatment site was clearly identified and confirmed by the patient. All components of Universal Protocol/PAUSE Rule completed.

## 2023-02-27 NOTE — ED ADULT TRIAGE NOTE - WEIGHT METHOD
----- Message from Lorraine Tello MA sent at 2/27/2023  1:47 PM CST -----  Regarding: FW: need medical info    ----- Message -----  From: Tremontana Chevalier  Sent: 2/27/2023  12:26 PM CST  To: Ashlie Aaron Staff  Subject: need medical info                                # [facility] [place], [place] Melba clling to s/w Dr. STACY Dailey's office - BSBS says need medical info from Dr. Dailey DOS 01/26/23 - claim is incomplete.  [facility] informed mom that they have not recvd the order for Botox. Pls call mom @ [phone].        
stated

## 2023-06-01 ENCOUNTER — INPATIENT (INPATIENT)
Facility: HOSPITAL | Age: 78
LOS: 6 days | Discharge: NURSING, REHAB OR LONG-TERM CARE | DRG: 871 | End: 2023-06-08
Attending: INTERNAL MEDICINE | Admitting: HOSPITALIST
Payer: MEDICARE

## 2023-06-01 VITALS
HEART RATE: 85 BPM | OXYGEN SATURATION: 97 % | DIASTOLIC BLOOD PRESSURE: 76 MMHG | HEIGHT: 69 IN | TEMPERATURE: 100 F | WEIGHT: 160.06 LBS | SYSTOLIC BLOOD PRESSURE: 148 MMHG | RESPIRATION RATE: 17 BRPM

## 2023-06-01 DIAGNOSIS — J18.9 PNEUMONIA, UNSPECIFIED ORGANISM: ICD-10-CM

## 2023-06-01 LAB
ADD ON TEST-SPECIMEN IN LAB: SIGNIFICANT CHANGE UP
ALBUMIN SERPL ELPH-MCNC: 2.4 G/DL — LOW (ref 3.3–5)
ALP SERPL-CCNC: 88 U/L — SIGNIFICANT CHANGE UP (ref 40–120)
ALT FLD-CCNC: 22 U/L — SIGNIFICANT CHANGE UP (ref 12–78)
ANION GAP SERPL CALC-SCNC: 7 MMOL/L — SIGNIFICANT CHANGE UP (ref 5–17)
APPEARANCE UR: CLEAR — SIGNIFICANT CHANGE UP
APTT BLD: 32 SEC — SIGNIFICANT CHANGE UP (ref 27.5–35.5)
AST SERPL-CCNC: 23 U/L — SIGNIFICANT CHANGE UP (ref 15–37)
BACTERIA # UR AUTO: ABNORMAL
BASOPHILS # BLD AUTO: 0.03 K/UL — SIGNIFICANT CHANGE UP (ref 0–0.2)
BASOPHILS NFR BLD AUTO: 0.6 % — SIGNIFICANT CHANGE UP (ref 0–2)
BILIRUB SERPL-MCNC: 0.3 MG/DL — SIGNIFICANT CHANGE UP (ref 0.2–1.2)
BILIRUB UR-MCNC: NEGATIVE — SIGNIFICANT CHANGE UP
BUN SERPL-MCNC: 36 MG/DL — HIGH (ref 7–23)
CALCIUM SERPL-MCNC: 7.9 MG/DL — LOW (ref 8.5–10.1)
CHLORIDE SERPL-SCNC: 107 MMOL/L — SIGNIFICANT CHANGE UP (ref 96–108)
CO2 SERPL-SCNC: 24 MMOL/L — SIGNIFICANT CHANGE UP (ref 22–31)
COLOR SPEC: YELLOW — SIGNIFICANT CHANGE UP
CREAT SERPL-MCNC: 1.58 MG/DL — HIGH (ref 0.5–1.3)
DIFF PNL FLD: ABNORMAL
EGFR: 45 ML/MIN/1.73M2 — LOW
EOSINOPHIL # BLD AUTO: 0.19 K/UL — SIGNIFICANT CHANGE UP (ref 0–0.5)
EOSINOPHIL NFR BLD AUTO: 3.6 % — SIGNIFICANT CHANGE UP (ref 0–6)
EPI CELLS # UR: SIGNIFICANT CHANGE UP
GLUCOSE SERPL-MCNC: 83 MG/DL — SIGNIFICANT CHANGE UP (ref 70–99)
GLUCOSE UR QL: NEGATIVE — SIGNIFICANT CHANGE UP
HCT VFR BLD CALC: 28.7 % — LOW (ref 39–50)
HGB BLD-MCNC: 8.7 G/DL — LOW (ref 13–17)
IMM GRANULOCYTES NFR BLD AUTO: 0.6 % — SIGNIFICANT CHANGE UP (ref 0–0.9)
INR BLD: 1.12 RATIO — SIGNIFICANT CHANGE UP (ref 0.88–1.16)
KETONES UR-MCNC: NEGATIVE — SIGNIFICANT CHANGE UP
LACTATE SERPL-SCNC: 1.1 MMOL/L — SIGNIFICANT CHANGE UP (ref 0.7–2)
LACTATE SERPL-SCNC: 2.2 MMOL/L — HIGH (ref 0.7–2)
LEUKOCYTE ESTERASE UR-ACNC: ABNORMAL
LYMPHOCYTES # BLD AUTO: 1.1 K/UL — SIGNIFICANT CHANGE UP (ref 1–3.3)
LYMPHOCYTES # BLD AUTO: 20.8 % — SIGNIFICANT CHANGE UP (ref 13–44)
MCHC RBC-ENTMCNC: 27.9 PG — SIGNIFICANT CHANGE UP (ref 27–34)
MCHC RBC-ENTMCNC: 30.3 GM/DL — LOW (ref 32–36)
MCV RBC AUTO: 92 FL — SIGNIFICANT CHANGE UP (ref 80–100)
MONOCYTES # BLD AUTO: 0.6 K/UL — SIGNIFICANT CHANGE UP (ref 0–0.9)
MONOCYTES NFR BLD AUTO: 11.4 % — SIGNIFICANT CHANGE UP (ref 2–14)
NEUTROPHILS # BLD AUTO: 3.33 K/UL — SIGNIFICANT CHANGE UP (ref 1.8–7.4)
NEUTROPHILS NFR BLD AUTO: 63 % — SIGNIFICANT CHANGE UP (ref 43–77)
NITRITE UR-MCNC: NEGATIVE — SIGNIFICANT CHANGE UP
PH UR: 8 — SIGNIFICANT CHANGE UP (ref 5–8)
PLATELET # BLD AUTO: 321 K/UL — SIGNIFICANT CHANGE UP (ref 150–400)
POTASSIUM SERPL-MCNC: 3.8 MMOL/L — SIGNIFICANT CHANGE UP (ref 3.5–5.3)
POTASSIUM SERPL-SCNC: 3.8 MMOL/L — SIGNIFICANT CHANGE UP (ref 3.5–5.3)
PROT SERPL-MCNC: 7.2 GM/DL — SIGNIFICANT CHANGE UP (ref 6–8.3)
PROT UR-MCNC: 30 MG/DL
PROTHROM AB SERPL-ACNC: 13 SEC — SIGNIFICANT CHANGE UP (ref 10.5–13.4)
RBC # BLD: 3.12 M/UL — LOW (ref 4.2–5.8)
RBC # FLD: 14.6 % — HIGH (ref 10.3–14.5)
RBC CASTS # UR COMP ASSIST: ABNORMAL /HPF (ref 0–4)
SODIUM SERPL-SCNC: 138 MMOL/L — SIGNIFICANT CHANGE UP (ref 135–145)
SP GR SPEC: 1.01 — SIGNIFICANT CHANGE UP (ref 1.01–1.02)
TROPONIN I, HIGH SENSITIVITY RESULT: 12.6 NG/L — SIGNIFICANT CHANGE UP
TROPONIN I, HIGH SENSITIVITY RESULT: 18.6 NG/L — SIGNIFICANT CHANGE UP
UROBILINOGEN FLD QL: NEGATIVE — SIGNIFICANT CHANGE UP
WBC # BLD: 5.28 K/UL — SIGNIFICANT CHANGE UP (ref 3.8–10.5)
WBC # FLD AUTO: 5.28 K/UL — SIGNIFICANT CHANGE UP (ref 3.8–10.5)
WBC UR QL: SIGNIFICANT CHANGE UP /HPF (ref 0–5)

## 2023-06-01 PROCEDURE — 99285 EMERGENCY DEPT VISIT HI MDM: CPT

## 2023-06-01 PROCEDURE — 71045 X-RAY EXAM CHEST 1 VIEW: CPT | Mod: 26

## 2023-06-01 PROCEDURE — 85610 PROTHROMBIN TIME: CPT

## 2023-06-01 PROCEDURE — 97116 GAIT TRAINING THERAPY: CPT | Mod: GP

## 2023-06-01 PROCEDURE — 36415 COLL VENOUS BLD VENIPUNCTURE: CPT

## 2023-06-01 PROCEDURE — 85730 THROMBOPLASTIN TIME PARTIAL: CPT

## 2023-06-01 PROCEDURE — 97162 PT EVAL MOD COMPLEX 30 MIN: CPT | Mod: GP

## 2023-06-01 PROCEDURE — 99223 1ST HOSP IP/OBS HIGH 75: CPT

## 2023-06-01 PROCEDURE — 80053 COMPREHEN METABOLIC PANEL: CPT

## 2023-06-01 PROCEDURE — 85025 COMPLETE CBC W/AUTO DIFF WBC: CPT

## 2023-06-01 PROCEDURE — 99497 ADVNCD CARE PLAN 30 MIN: CPT | Mod: 25

## 2023-06-01 PROCEDURE — 83605 ASSAY OF LACTIC ACID: CPT

## 2023-06-01 PROCEDURE — 82272 OCCULT BLD FECES 1-3 TESTS: CPT

## 2023-06-01 PROCEDURE — 0225U NFCT DS DNA&RNA 21 SARSCOV2: CPT

## 2023-06-01 PROCEDURE — 84484 ASSAY OF TROPONIN QUANT: CPT

## 2023-06-01 PROCEDURE — 86803 HEPATITIS C AB TEST: CPT

## 2023-06-01 RX ORDER — TAMSULOSIN HYDROCHLORIDE 0.4 MG/1
0.4 CAPSULE ORAL AT BEDTIME
Refills: 0 | Status: DISCONTINUED | OUTPATIENT
Start: 2023-06-01 | End: 2023-06-08

## 2023-06-01 RX ORDER — MIRTAZAPINE 45 MG/1
7.5 TABLET, ORALLY DISINTEGRATING ORAL AT BEDTIME
Refills: 0 | Status: DISCONTINUED | OUTPATIENT
Start: 2023-06-01 | End: 2023-06-08

## 2023-06-01 RX ORDER — SODIUM CHLORIDE 9 MG/ML
2300 INJECTION INTRAMUSCULAR; INTRAVENOUS; SUBCUTANEOUS ONCE
Refills: 0 | Status: COMPLETED | OUTPATIENT
Start: 2023-06-01 | End: 2023-06-01

## 2023-06-01 RX ORDER — OLANZAPINE 15 MG/1
5 TABLET, FILM COATED ORAL ONCE
Refills: 0 | Status: COMPLETED | OUTPATIENT
Start: 2023-06-01 | End: 2023-06-01

## 2023-06-01 RX ORDER — ALBUTEROL 90 UG/1
2 AEROSOL, METERED ORAL EVERY 6 HOURS
Refills: 0 | Status: DISCONTINUED | OUTPATIENT
Start: 2023-06-01 | End: 2023-06-08

## 2023-06-01 RX ORDER — ACETAMINOPHEN 500 MG
650 TABLET ORAL EVERY 6 HOURS
Refills: 0 | Status: DISCONTINUED | OUTPATIENT
Start: 2023-06-01 | End: 2023-06-08

## 2023-06-01 RX ORDER — CEFEPIME 1 G/1
1000 INJECTION, POWDER, FOR SOLUTION INTRAMUSCULAR; INTRAVENOUS EVERY 12 HOURS
Refills: 0 | Status: DISCONTINUED | OUTPATIENT
Start: 2023-06-01 | End: 2023-06-07

## 2023-06-01 RX ORDER — VANCOMYCIN HCL 1 G
1000 VIAL (EA) INTRAVENOUS EVERY 24 HOURS
Refills: 0 | Status: DISCONTINUED | OUTPATIENT
Start: 2023-06-01 | End: 2023-06-02

## 2023-06-01 RX ORDER — SENNA PLUS 8.6 MG/1
2 TABLET ORAL AT BEDTIME
Refills: 0 | Status: DISCONTINUED | OUTPATIENT
Start: 2023-06-01 | End: 2023-06-08

## 2023-06-01 RX ORDER — ACETAMINOPHEN 500 MG
1000 TABLET ORAL ONCE
Refills: 0 | Status: COMPLETED | OUTPATIENT
Start: 2023-06-01 | End: 2023-06-01

## 2023-06-01 RX ORDER — POLYETHYLENE GLYCOL 3350 17 G/17G
17 POWDER, FOR SOLUTION ORAL DAILY
Refills: 0 | Status: DISCONTINUED | OUTPATIENT
Start: 2023-06-01 | End: 2023-06-08

## 2023-06-01 RX ORDER — VANCOMYCIN HCL 1 G
1000 VIAL (EA) INTRAVENOUS ONCE
Refills: 0 | Status: COMPLETED | OUTPATIENT
Start: 2023-06-01 | End: 2023-06-01

## 2023-06-01 RX ORDER — GUAIFENESIN/DEXTROMETHORPHAN 600MG-30MG
10 TABLET, EXTENDED RELEASE 12 HR ORAL EVERY 4 HOURS
Refills: 0 | Status: DISCONTINUED | OUTPATIENT
Start: 2023-06-01 | End: 2023-06-08

## 2023-06-01 RX ORDER — MULTIVIT-MIN/FERROUS GLUCONATE 9 MG/15 ML
1 LIQUID (ML) ORAL DAILY
Refills: 0 | Status: DISCONTINUED | OUTPATIENT
Start: 2023-06-01 | End: 2023-06-08

## 2023-06-01 RX ORDER — CEFEPIME 1 G/1
2000 INJECTION, POWDER, FOR SOLUTION INTRAMUSCULAR; INTRAVENOUS ONCE
Refills: 0 | Status: COMPLETED | OUTPATIENT
Start: 2023-06-01 | End: 2023-06-01

## 2023-06-01 RX ORDER — LACTULOSE 10 G/15ML
20 SOLUTION ORAL EVERY 12 HOURS
Refills: 0 | Status: DISCONTINUED | OUTPATIENT
Start: 2023-06-01 | End: 2023-06-08

## 2023-06-01 RX ORDER — PREGABALIN 225 MG/1
1000 CAPSULE ORAL DAILY
Refills: 0 | Status: DISCONTINUED | OUTPATIENT
Start: 2023-06-01 | End: 2023-06-08

## 2023-06-01 RX ORDER — FERROUS SULFATE 325(65) MG
325 TABLET ORAL
Refills: 0 | Status: DISCONTINUED | OUTPATIENT
Start: 2023-06-01 | End: 2023-06-08

## 2023-06-01 RX ADMIN — Medication 250 MILLIGRAM(S): at 16:57

## 2023-06-01 RX ADMIN — Medication 1000 MILLIGRAM(S): at 22:24

## 2023-06-01 RX ADMIN — MIRTAZAPINE 7.5 MILLIGRAM(S): 45 TABLET, ORALLY DISINTEGRATING ORAL at 23:30

## 2023-06-01 RX ADMIN — Medication 400 MILLIGRAM(S): at 20:35

## 2023-06-01 RX ADMIN — TAMSULOSIN HYDROCHLORIDE 0.4 MILLIGRAM(S): 0.4 CAPSULE ORAL at 23:30

## 2023-06-01 RX ADMIN — OLANZAPINE 5 MILLIGRAM(S): 15 TABLET, FILM COATED ORAL at 17:08

## 2023-06-01 RX ADMIN — CEFEPIME 100 MILLIGRAM(S): 1 INJECTION, POWDER, FOR SOLUTION INTRAMUSCULAR; INTRAVENOUS at 15:15

## 2023-06-01 RX ADMIN — SENNA PLUS 2 TABLET(S): 8.6 TABLET ORAL at 23:30

## 2023-06-01 RX ADMIN — Medication 325 MILLIGRAM(S): at 23:30

## 2023-06-01 RX ADMIN — SODIUM CHLORIDE 2300 MILLILITER(S): 9 INJECTION INTRAMUSCULAR; INTRAVENOUS; SUBCUTANEOUS at 13:35

## 2023-06-01 NOTE — ED PROVIDER NOTE - OBJECTIVE STATEMENT
79 y/o male with a PMHx of anemia, BPH, depression, dementia, UTI presents to the ED BIBA from [facility] for cough and fever x2 days. Hx limited secondary to dementia.

## 2023-06-01 NOTE — ED ADULT NURSE NOTE - NSFALLHARMRISKINTERV_ED_ALL_ED
Assistance OOB with selected safe patient handling equipment if applicable/Communicate risk of Fall with Harm to all staff, patient, and family/Monitor gait and stability/Provide patient with walking aids/Provide visual cue: red socks, yellow wristband, yellow gown, etc/Reinforce activity limits and safety measures with patient and family/Bed in lowest position, wheels locked, appropriate side rails in place/Call bell, personal items and telephone in reach/Instruct patient to call for assistance before getting out of bed/chair/stretcher/Non-slip footwear applied when patient is off stretcher/[place] to call system/Physically safe environment - no spills, clutter or unnecessary equipment/Purposeful Proactive Rounding/Room/bathroom lighting operational, light cord in reach

## 2023-06-01 NOTE — ED ADULT NURSE NOTE - CHIEF COMPLAINT QUOTE
Pt brought in by ambulance from [facility] for cough and fever x 2 days. Pt has baseline dementia and has been uncooperative with staff at [facility], not letting them draw blood or take vitals. Pt not cooperative with having temperature taken at triage.

## 2023-06-01 NOTE — H&P ADULT - HISTORY OF PRESENT ILLNESS
Pt is a 79 yo male with a pmh/o anemia, BPH, depression, dementia with behavioral disturbance, UTI, who presents from the [facility] due to cough with fever that began yesterday afternoon. Per staff, pt has been combative and not allowing for vitals or labs to be performed. Pt sent to r/o PNA and sepsis.

## 2023-06-01 NOTE — ED PROVIDER NOTE - NSICDXPASTMEDICALHX_GEN_ALL_CORE_FT
PAST MEDICAL HISTORY:  Anemia     BPH (benign prostatic hyperplasia)     Dementia     Depression

## 2023-06-01 NOTE — H&P ADULT - NEUROLOGICAL COMMENTS
exam limited due to pt inability to follow commands and dementia with behavioral disturbance, no gross neurological deficits appreciated on exam

## 2023-06-01 NOTE — ED ADULT TRIAGE NOTE - CHIEF COMPLAINT QUOTE
Pt brought in by ambulance from [facility] for cough and fever x 2 days. Pt has baseline dementia and has been uncooperative with staff at [facility], not letting them draw blood or take vitals. Pt not cooperative with having temperature taken at triage.

## 2023-06-01 NOTE — ED ADULT NURSE REASSESSMENT NOTE - NS ED NURSE REASSESS COMMENT FT1
pt confused and agitated, attempting to climb out of bed, pulling at dutton cathter. pt repositioned in bed, both side rails elevated, fall precuations in place. pt in front of nurses station for enhanced supervision. Dr. Carrillo made aware, pt given oral Olanzapine at this time. unable to get VS at this time and tech unable to obtain EKG at this time, MD aware. will attempt VS after pt calms.

## 2023-06-01 NOTE — ED PROVIDER NOTE - CONSTITUTIONAL, MLM
Well appearing, awake, alert, and in no apparent distress. normal... Well appearing, awake, alert, and in no apparent distress. Not wishing to be examined confused

## 2023-06-01 NOTE — H&P ADULT - CONVERSATION DETAILS
MOLST received from facility , Pt is a DNR, but +intubation trial if non invasive efforts. MOLST updated and placed in chart. 16 min spent reviewing pt advanced care planning and MOLST/NH chart.

## 2023-06-01 NOTE — H&P ADULT - ASSESSMENT
Pt is a 77 yo male with a pmh/o anemia, BPH, depression, dementia with behavioral disturbance, UTI, who presents from the [facility] due to cough with fever that began yesterday afternoon. Per staff, pt has been combative and not allowing for vitals or labs to be performed. Pt sent to r/o PNA and sepsis.     #Acute Infectious encephalopathy  #PNA complicated by sepsis  Admit to med/surg  HCAP as pt from NH  c/w IV abx  RVP/COVID swab ordered  Imaging reviwed, consistent with viral PNA with concern for superimposed bacterial infection  SIRS criteria met: HR 94, Temp 101.9, +source   Lactate 2.2-> 1.1 s/p 2300 cc IVF bolus  f/u blood and urine cultures  Tylenol prn fever/pain  neurochecks q 8 hrs  albuterol HFA prn sob/wheezing  robatussin prn cough  aspiration precaution  SCD for DVT ppx    #Acute anemia  H/h baseline 10/32-> today 8.7/28.7  Pt in [facility], unable to perform guiac  Occult blood ordered  Hold chemical dvt ppx  Abd benign, no GI sx  monitor h/h, f/u cbc in AM  c/w ferrous sulfate    #CKDIII  Cr at baseline of around 1.6  Renally dose meds  Avoid nephrotoxic meds  intake and output    #Pseudohypocalcemia   Corrected Ca: 9.2  monitor on serum chemstry    #Protein calorie malnutrition  Dietary consult  c/w mechanical soft with ensure plus TID and LPS BID    #Dementia with behavioral disturbance  #Depression  fall/aspiration precautions  c/w remeron  OOB and ambulate with assistance  soft mittens for interference with medical managment- pt pulling at IV line    #BPH  c/w flomax  microscopic hematuria on urinalysis, f/u as outpt with PMD for rpt u/a    #Chronic constipation  c/w lactulose bid, hold if BM within last 24 hrs  c/w miralax and senna qhs- hold for loose stool

## 2023-06-01 NOTE — PHARMACOTHERAPY INTERVENTION NOTE - COMMENTS
Medication history verified with RN from [facility].     Patient was given 1 dose of Augmentin 500 for PNA in the evening 5/31/23 but refused morning dose 6/1/23    Patient takes laxatives STANDING everyday

## 2023-06-01 NOTE — ED ADULT NURSE REASSESSMENT NOTE - NS ED NURSE REASSESS COMMENT FT1
Dr. Ellis called and made aware of pt repeat VS and fever. Pt to receive 1G Ofirmev IV as MD order. Dr. Ellis also made aware of being unable to obtain pt EKG at this time due to adgitation and combtaiveness by patient with attempts. As per MD, will attempt EKG at a later time when pt is calmer.

## 2023-06-02 LAB
ALBUMIN SERPL ELPH-MCNC: 2.2 G/DL — LOW (ref 3.3–5)
ALP SERPL-CCNC: 83 U/L — SIGNIFICANT CHANGE UP (ref 40–120)
ALT FLD-CCNC: 19 U/L — SIGNIFICANT CHANGE UP (ref 12–78)
ANION GAP SERPL CALC-SCNC: 6 MMOL/L — SIGNIFICANT CHANGE UP (ref 5–17)
APTT BLD: 30.1 SEC — SIGNIFICANT CHANGE UP (ref 27.5–35.5)
AST SERPL-CCNC: 20 U/L — SIGNIFICANT CHANGE UP (ref 15–37)
BASOPHILS # BLD AUTO: 0.02 K/UL — SIGNIFICANT CHANGE UP (ref 0–0.2)
BASOPHILS NFR BLD AUTO: 0.3 % — SIGNIFICANT CHANGE UP (ref 0–2)
BILIRUB SERPL-MCNC: 0.5 MG/DL — SIGNIFICANT CHANGE UP (ref 0.2–1.2)
BUN SERPL-MCNC: 31 MG/DL — HIGH (ref 7–23)
CALCIUM SERPL-MCNC: 8 MG/DL — LOW (ref 8.5–10.1)
CHLORIDE SERPL-SCNC: 115 MMOL/L — HIGH (ref 96–108)
CO2 SERPL-SCNC: 24 MMOL/L — SIGNIFICANT CHANGE UP (ref 22–31)
CREAT SERPL-MCNC: 1.54 MG/DL — HIGH (ref 0.5–1.3)
EGFR: 46 ML/MIN/1.73M2 — LOW
EOSINOPHIL # BLD AUTO: 0.14 K/UL — SIGNIFICANT CHANGE UP (ref 0–0.5)
EOSINOPHIL NFR BLD AUTO: 2 % — SIGNIFICANT CHANGE UP (ref 0–6)
GLUCOSE SERPL-MCNC: 85 MG/DL — SIGNIFICANT CHANGE UP (ref 70–99)
HCT VFR BLD CALC: 28.5 % — LOW (ref 39–50)
HCV AB S/CO SERPL IA: 0.84 S/CO — SIGNIFICANT CHANGE UP (ref 0–0.99)
HCV AB SERPL-IMP: SIGNIFICANT CHANGE UP
HGB BLD-MCNC: 8.8 G/DL — LOW (ref 13–17)
HPIV3 RNA SPEC QL NAA+PROBE: DETECTED
IMM GRANULOCYTES NFR BLD AUTO: 0.3 % — SIGNIFICANT CHANGE UP (ref 0–0.9)
INR BLD: 1.19 RATIO — HIGH (ref 0.88–1.16)
LYMPHOCYTES # BLD AUTO: 0.79 K/UL — LOW (ref 1–3.3)
LYMPHOCYTES # BLD AUTO: 11 % — LOW (ref 13–44)
MCHC RBC-ENTMCNC: 28 PG — SIGNIFICANT CHANGE UP (ref 27–34)
MCHC RBC-ENTMCNC: 30.9 GM/DL — LOW (ref 32–36)
MCV RBC AUTO: 90.8 FL — SIGNIFICANT CHANGE UP (ref 80–100)
MONOCYTES # BLD AUTO: 0.71 K/UL — SIGNIFICANT CHANGE UP (ref 0–0.9)
MONOCYTES NFR BLD AUTO: 9.9 % — SIGNIFICANT CHANGE UP (ref 2–14)
NEUTROPHILS # BLD AUTO: 5.47 K/UL — SIGNIFICANT CHANGE UP (ref 1.8–7.4)
NEUTROPHILS NFR BLD AUTO: 76.5 % — SIGNIFICANT CHANGE UP (ref 43–77)
PLATELET # BLD AUTO: 307 K/UL — SIGNIFICANT CHANGE UP (ref 150–400)
POTASSIUM SERPL-MCNC: 4.2 MMOL/L — SIGNIFICANT CHANGE UP (ref 3.5–5.3)
POTASSIUM SERPL-SCNC: 4.2 MMOL/L — SIGNIFICANT CHANGE UP (ref 3.5–5.3)
PROT SERPL-MCNC: 6.8 GM/DL — SIGNIFICANT CHANGE UP (ref 6–8.3)
PROTHROM AB SERPL-ACNC: 13.8 SEC — HIGH (ref 10.5–13.4)
RAPID RVP RESULT: DETECTED
RBC # BLD: 3.14 M/UL — LOW (ref 4.2–5.8)
RBC # FLD: 14.6 % — HIGH (ref 10.3–14.5)
SARS-COV-2 RNA SPEC QL NAA+PROBE: SIGNIFICANT CHANGE UP
SODIUM SERPL-SCNC: 145 MMOL/L — SIGNIFICANT CHANGE UP (ref 135–145)
WBC # BLD: 7.15 K/UL — SIGNIFICANT CHANGE UP (ref 3.8–10.5)
WBC # FLD AUTO: 7.15 K/UL — SIGNIFICANT CHANGE UP (ref 3.8–10.5)

## 2023-06-02 PROCEDURE — 99232 SBSQ HOSP IP/OBS MODERATE 35: CPT

## 2023-06-02 RX ADMIN — CEFEPIME 1000 MILLIGRAM(S): 1 INJECTION, POWDER, FOR SOLUTION INTRAMUSCULAR; INTRAVENOUS at 23:31

## 2023-06-02 RX ADMIN — CEFEPIME 1000 MILLIGRAM(S): 1 INJECTION, POWDER, FOR SOLUTION INTRAMUSCULAR; INTRAVENOUS at 08:22

## 2023-06-02 NOTE — PATIENT PROFILE ADULT - SAFE PLACE TO LIVE
[facility]  History & Physical  Orthopedics     SUBJECTIVE:       Chief Complaint: Right thumb     Referring Provider: No ref. provider found      Dr. Elias is the supervising physician for this encounter/patient     History of Present Illness:  Patient is a 37 y.o. right hand dominant female who presents today with complaints of right thumb injury occurred on 3/4/23, while on vacation in [place], she was gripping a handrail and slipped/fell, this caused a hyper abduction injury to the thumb. She has noted pain, swelling and numbness/tingling off on in the thumb since the injury. Pain and instability when trying to  something. No surgical history on the hands.      Interval History 3/31/23: the patient returns for continued treatment of her right thumb injury, concern for ucl injury, just 4 weeks from injury. She has started OT, has completed 3 sessions thus far. She reports that her forearm pain is improving, but the thumb pain has remained the same. Difficulty with motion due to pain. She is wearing her orthosis, but has trouble getting through OT due to pain.     Interval history April 25, 2023: The patient returns today re-evaluation of right thumb.  She an injury about 6 weeks ago while vacationing in [place].  She had significant and severe pain at aspect of the right thumb MCP joint difficulty with pinching activities secondary feelings of instability since that time.  She had a recent MRI returns these results and re-evaluation.  No other complaints today.     The patient is a/an [facility] scheduling.     Onset of symptoms/DOI was 3/4/23.     Symptoms are aggravated by activity and movement.     Symptoms are alleviated by rest.     Symptoms consist of pain, swelling, decreased ROM, and numbness/tingling.     The patient rates their pain as a 7/10     Attempted treatment(s) and/or interventions include activity modifications, rest, anti-inflammatory medications, orthosis and OT.     The  patient denies any fevers, chills, N/V, D/C and presents for evaluation.             Past Medical History:   Diagnosis Date    Fibroids      Hypertension        No past surgical history on file.  Review of patient's allergies indicates:  No Known Allergies  Social History          Social History Narrative    Not on file            Family History   Problem Relation Age of Onset    Lupus Mother      Hypertension Father      Cancer Maternal Aunt           colon, breast            Current Outpatient Medications:     acetaminophen (TYLENOL) 500 MG tablet, Take 2 tablets (1,000 mg total) by mouth every 8 (eight) hours as needed for Pain., Disp: 60 tablet, Rfl: 0    ALPRAZolam (XANAX) 0.5 MG tablet, Take 0.5-1 tablets (0.25-0.5 mg total) by mouth 2 (two) times daily as needed (SIGNIFICANT ANXIETY)., Disp: 60 tablet, Rfl: 0    buPROPion (WELLBUTRIN XL) 150 MG TB24 tablet, Take 1 tablet (150 mg total) by mouth once daily., Disp: 90 tablet, Rfl: 0    cetirizine (ZYRTEC) 10 MG tablet, Take 1 tablet (10 mg total) by mouth every evening. for 10 days, Disp: 10 tablet, Rfl: 0    HYDROcodone-acetaminophen (NORCO) 5-325 mg per tablet, Take 1 tablet by mouth every 12 (twelve) hours as needed for Pain., Disp: 10 tablet, Rfl: 0    meloxicam (MOBIC) 15 MG tablet, Take 1 tablet (15 mg total) by mouth once daily. (Patient not taking: Reported on 3/31/2023), Disp: 30 tablet, Rfl: 0    meloxicam (MOBIC) 15 MG tablet, Take 1 tablet (15 mg total) by mouth once daily., Disp: 30 tablet, Rfl: 0    metoprolol succinate (TOPROL-XL) 50 MG 24 hr tablet, Take 1 tablet (50 mg total) by mouth once daily., Disp: 90 tablet, Rfl: 0    traMADoL (ULTRAM) 50 mg tablet, Take 1 tablet (50 mg total) by mouth every 12 (twelve) hours as needed for Pain., Disp: 20 tablet, Rfl: 0        Review of Systems:  Constitutional: no fever or chills  Eyes: no visual changes  ENT: no nasal congestion or sore throat  Respiratory: no cough or shortness of  "breath  Cardiovascular: no chest pain  Gastrointestinal: no nausea or vomiting, tolerating diet  Musculoskeletal: pain, soreness, numbness/tingling, and decreased ROM     OBJECTIVE:       Vital Signs (Most Recent):  Vitals       Vitals:     04/25/23 1150   Weight: 92.5 kg (204 lb)   Height: 5' 2" (1.575 m)         Body mass index is 37.31 kg/m².        Physical Exam:  Constitutional: The patient appears well-developed and well-nourished. No distress.   Skin: No lesions appreciated  Head: Normocephalic and atraumatic.   Nose: Nose normal.   Ears: No deformities seen  Eyes: Conjunctivae and EOM are normal.   Neck: No tracheal deviation present.   Cardiovascular: Normal rate and intact distal pulses.    Pulmonary/Chest: Effort normal. No respiratory distress.   Abdominal: There is no guarding.   Neurological: The patient is alert.   Psychiatric: The patient has a normal mood and affect.      Right Hand/Wrist Examination:     Observation/Inspection:  Swelling                       Mild to the thumb                      Deformity                     none  Discoloration               none                  Scars                           none                  Atrophy                        none     HAND/WRIST EXAMINATION:  Finkelstein's Test                                Neg  WHAT Test                                         Neg  Snuff box tenderness                          Neg  Man's Test                                     Neg  Hook of Hamate Tenderness              Neg  CMC grind                                           Neg  Circumduction test                              Neg  Acutely TTP to the ulnar boarder of the thumb MCP joint, NTTP to the thumb A1 pulley.  Examination of the right thumb demonstrates pathologic laxity the ulnar collateral ligament of MCP joint in both full extension degrees of flexion; this is asymmetric to the contralateral uninjured thumb     Neurovascular Exam:  Digits WWP, brisk CR < 3s " throughout  NVI motor/LTS to M/R/U nerves, radial pulse 2+  Tinel's Test - Carpal Tunnel                Neg  Tinel's Test - Cubital Tunnel               Neg  Phalen's Test                                      Neg  Median Nerve Compression Test       Neg     ROM hand: decreased thumb motion due to pain, however, MCP and IP flexion/extension intact.     ROM wrist full, painless    ROM elbow full, painless     Abdomen not guarded  Respirations nonlabored  Perfusion intact     Diagnostic Results:     Imaging - I independently viewed the patient's imaging as well as the radiology report.       FINDINGS:  No acute fractures.  Preserved bone density.  Preserved joint spaces.  No opaque soft tissue foreign bodies.  Soft tissue swelling dorsally at the level of the metacarpals and MCP articulations.     MRI right thumb-   Impression:     Complete tear of ulnar collateral ligament at the distal attachment with retraction and suspected Stener lesion.        Impression:     As above     EMG - none     ASSESSMENT/PLAN:       37 y.o. yo female with Right thumb UCL tear with Stener lesion     Plan: The patient and I had a thorough discussion today.  We discussed the working diagnosis as well as several other potential alternative diagnoses.  Treatment options were discussed, both conservative and surgical.  Conservative treatment options would include things such as activity modifications, workplace modifications, a period of rest, oral vs topical OTC and prescription anti-inflammatory medications, occupational therapy, splinting/bracing, immobilization, corticosteroid injections, and others.  Surgical options were discussed as well.      At this time, the patient like to proceed with right thumb UCL repair with internal brace on April 28, 2023 which I feel is reasonable.  We will get this scheduled.      The patient has not responded to adequate non operative treatment at this time and/or non operative treatment is not indicated.  Thus, the risks, benefits and alternatives to surgery were discussed with the patient in detail.  Specific risks include but are not limited to bleeding, infection, vessel and/or nerve damage, pain, numbness, tingling, compartment syndrome, need for additional surgery, failure to return to pre-injury and/or preoperative functional status, inability to return to work, scar sensitivity, delayed healing, complex regional pain syndrome, weakness, pulley injury, tendon injury, bowstringing, partial and/or incomplete relief of symptoms, persistence of and/or worsening of symptoms, hardware and/or surgical failure, prominent and/or symptomatic hardware possibly necessitating future removal, osteomyelitis, amputation, loss of function, stiffness, rotational malalignment, functional debility, dysfunction, decreased  strength, need for prolonged postoperative rehabilitation, malunion, nonunion, deep venous thrombosis, pulmonary embolism, arthritis and death.  The patient states an understanding and wishes to proceed with surgery.   All questions were answered.  No guarantees were implied or stated.  Written informed consent was obtained.      no

## 2023-06-02 NOTE — ED ADULT NURSE REASSESSMENT NOTE - NS ED NURSE REASSESS COMMENT FT1
Pt in bed resting comfortably. VSS. IV antibiotics administered and tolerated well. pt verbally aggressive toward staff, refusing PO meds. Pt spit PO medication onto bed. Safety maintained

## 2023-06-02 NOTE — ED ADULT NURSE REASSESSMENT NOTE - NS ED NURSE REASSESS COMMENT FT1
Pt is A&OX0. Pt at this time is refusing a oral or rectal temperature but otherwise VS stable. Pt aware room is going upstairs soon. Pt remains on isolation precautions.

## 2023-06-02 NOTE — CONSULT NOTE ADULT - ASSESSMENT
79 yo male with a pmh/o anemia, BPH, depression, dementia with behavioral disturbance, UTI, who presents from the [facility] due to cough with fever. Per staff, pt has been combative and not allowing for vitals or labs to be performed. Pt sent to r/o PNA and sepsis. Here found to have LA 2.2, RVP positive for parainfluenza 3, imaging remarkable for prominent interstitial markings on both sides no consolidation, was given vancomycin/cefepime for superimposed bacterial coverage.      1. Fever. Viral syndrome. Parainfluenza 3. Superimposed pneumonia. CKD 3  - imaging reviewed  - s/p vancomycin -hold further vancomycin for now  - on cefepime [id number]   - continue with abx coverage  - f/u cultures  - monitor temps  - aspiration precautions  - fu cbc   - isolation precautions  - supportive care    2. other issues - care per medicine

## 2023-06-02 NOTE — ED ADULT NURSE REASSESSMENT NOTE - NS ED NURSE REASSESS COMMENT FT1
Pt received in bed sleeping. Received report from PM nurse. VSS. Awaiting to be admitted. Pt asked us to call his brother. Pt is verbally combative

## 2023-06-02 NOTE — PATIENT PROFILE ADULT - FUNCTIONAL ASSESSMENT - BASIC MOBILITY 6.
2-calculated by average/Not able to assess (calculate score using [facility] averaging method)

## 2023-06-02 NOTE — PATIENT PROFILE ADULT - FALL HARM RISK - HARM RISK INTERVENTIONS

## 2023-06-02 NOTE — CONSULT NOTE ADULT - SUBJECTIVE AND OBJECTIVE BOX
Patient is a 78y old  Male who presents with a chief complaint of acute infectious encephalopathy due to HCAP and complicated by sepsis (2023 21:14)    HPI:  79 yo male with a pmh/o anemia, BPH, depression, dementia with behavioral disturbance, UTI, who presents from the [facility] due to cough with fever. Per staff, pt has been combative and not allowing for vitals or labs to be performed. Pt sent to r/o PNA and sepsis. Here found to have LA 2.2, RVP positive for parainfluenza 3, imaging remarkable for prominent interstitial markings on both sides no consolidation, was given vancomycin/cefepime for superimposed bacterial coverage.      PMH: as above  PSH: as above  Meds: per reconciliation sheet, noted below  MEDICATIONS  (STANDING):  cefepime  Injectable. 1000 milliGRAM(s) IV Push every 12 hours  cyanocobalamin 1000 MICROGram(s) Oral daily  ferrous    sulfate 325 milliGRAM(s) Oral two times a day  lactulose Syrup 20 Gram(s) Oral every 12 hours  mirtazapine 7.5 milliGRAM(s) Oral at bedtime  multivitamin/minerals 1 Tablet(s) Oral daily  polyethylene glycol 3350 17 Gram(s) Oral daily  senna 2 Tablet(s) Oral at bedtime  tamsulosin 0.4 milliGRAM(s) Oral at bedtime      Allergies    No Known Allergies    Intolerances      Social: no smoking, no alcohol, no illegal drugs; no recent travel, no exposure to TB  FAMILY HISTORY:  No pertinent family history in first degree relatives       no history of premature cardiovascular disease in first degree relatives    ROS: unable to obtain d/t medical condition    All other systems reviewed and are negative    Vital Signs Last 24 Hrs  T(C): 36.4 (2023 08:32), Max: 38.8 (2023 20:00)  T(F): 97.5 (2023 08:32), Max: 101.9 (2023 20:00)  HR: 73 (2023 07:30) (73 - 104)  BP: 117/65 (2023 07:30) (117/65 - 148/76)  BP(mean): 80 (2023 07:30) (80 - 80)  RR: 20 (2023 07:30) (17 - 20)  SpO2: 97% (2023 07:30) (94% - 98%)    Parameters below as of 2023 07:30  Patient On (Oxygen Delivery Method): room air      Daily Height in cm: 175.26 (2023 12:30)    Daily     PE:  Constitutional: frail looking  HEENT: NC/AT, EOMI, PERRLA, conjunctivae clear; ears and nose atraumatic; pharynx benign  Neck: supple; thyroid not palpable  Back: no tenderness  Respiratory: decreased breath sounds, rhonchi  Cardiovascular: S1S2 regular, no murmurs  Abdomen: soft, not tender, not distended, positive BS; liver and spleen WNL  Genitourinary: no suprapubic tenderness  Lymphatic: no LN palpable  Musculoskeletal: no muscle tenderness, no joint swelling or tenderness  Extremities: no pedal edema  Neurological/ Psychiatric: moving all extremities  Skin: no rashes; no palpable lesions    Labs: all available labs reviewed                        8.8    7.15  )-----------( 307      ( 2023 06:20 )             28.5     06-02    145  |  115<H>  |  31<H>  ----------------------------<  85  4.2   |  24  |  1.54<H>    Ca    8.0<L>      2023 06:20    TPro  6.8  /  Alb  2.2<L>  /  TBili  0.5  /  DBili  x   /  AST  20  /  ALT  19  /  AlkPhos  83  06-02     LIVER FUNCTIONS - ( 2023 06:20 )  Alb: 2.2 g/dL / Pro: 6.8 gm/dL / ALK PHOS: 83 U/L / ALT: 19 U/L / AST: 20 U/L / GGT: x           Urinalysis Basic - ( 2023 14:55 )    Color: Yellow / Appearance: Clear / S.010 / pH: x  Gluc: x / Ketone: Negative  / Bili: Negative / Urobili: Negative   Blood: x / Protein: 30 mg/dL / Nitrite: Negative   Leuk Esterase: Trace / RBC: 11-25 /HPF / WBC 3-5 /HPF   Sq Epi: x / Non Sq Epi: x / Bacteria: Occasional      Parainfluenza 3 (RapRVP): Detected    Radiology: all available radiological tests reviewed      ACC: [id number] EXAM:  XR CHEST PORTABLE URGENT 1V   ORDERED BY: KIMBER SHER     PROCEDURE DATE:  2023          INTERPRETATION:  INDICATION: Sepsis    COMPARISON: 2021    FINDINGS:  An AP chest radiograph demonstrates a diffusebilateral reticulonodular   pattern, as a change from the prior exam. There are no focal alveolar   lobar consolidations to indicate lobar pneumonia. There is no   pneumothorax. No pleural fluid is seen. There is no hilar or mediastinal   widening. The cardiac silhouette is not enlarged for the projection.   There is some engorgement of central pulmonary veins but without rafael   pulmonary edema. The bony thorax remains stable.    IMPRESSION:  1. Prominent interstitial markings diffusely on both sides with a   reticulonodular pattern diffusely, but without without focal lobar   alveolar consolidation. This could represent atypical infection,   including viral etiology amongst others.  2. The cardiac silhouette is not enlarged although noting mild central   pulmonary venous engorgement but without specific evidence of pulmonary   edema.        Advanced directives addressed: full resuscitation

## 2023-06-03 LAB
CULTURE RESULTS: SIGNIFICANT CHANGE UP
OB PNL STL: NEGATIVE — SIGNIFICANT CHANGE UP
SPECIMEN SOURCE: SIGNIFICANT CHANGE UP

## 2023-06-03 PROCEDURE — 99232 SBSQ HOSP IP/OBS MODERATE 35: CPT

## 2023-06-03 RX ADMIN — Medication 325 MILLIGRAM(S): at 10:17

## 2023-06-03 RX ADMIN — POLYETHYLENE GLYCOL 3350 17 GRAM(S): 17 POWDER, FOR SOLUTION ORAL at 10:18

## 2023-06-03 RX ADMIN — CEFEPIME 1000 MILLIGRAM(S): 1 INJECTION, POWDER, FOR SOLUTION INTRAMUSCULAR; INTRAVENOUS at 21:51

## 2023-06-03 RX ADMIN — CEFEPIME 1000 MILLIGRAM(S): 1 INJECTION, POWDER, FOR SOLUTION INTRAMUSCULAR; INTRAVENOUS at 10:19

## 2023-06-03 RX ADMIN — LACTULOSE 20 GRAM(S): 10 SOLUTION ORAL at 21:57

## 2023-06-03 RX ADMIN — PREGABALIN 1000 MICROGRAM(S): 225 CAPSULE ORAL at 10:17

## 2023-06-03 RX ADMIN — LACTULOSE 20 GRAM(S): 10 SOLUTION ORAL at 10:17

## 2023-06-03 RX ADMIN — Medication 1 TABLET(S): at 10:18

## 2023-06-03 NOTE — DIETITIAN INITIAL EVALUATION ADULT - ADD RECOMMEND
Maintain soft BTSZ diet. Record PO intake in EMR after each meal (nursing.) MVI w/ minerals daily to ensure 100% RDA met.  Maintain Soft and bite sized diet  Record PO intake in EMR after each meal (nursing.)   MVI w/ minerals daily to ensure 100% RDA met  Consider adding thiamine 100 mg daily 2/2 poor PO intake/ malnutrition  Suggest add Vit C 500 mg BID, add Zinc Sulfate 220 mg x 10 days to promote wound healing  LPS bid  Monitor PO intake, tolerance, labs and weight.

## 2023-06-03 NOTE — DIETITIAN INITIAL EVALUATION ADULT - PERTINENT MEDS FT
MEDICATIONS  (STANDING):  cefepime  Injectable. 1000 milliGRAM(s) IV Push every 12 hours  cyanocobalamin 1000 MICROGram(s) Oral daily  ferrous    sulfate 325 milliGRAM(s) Oral two times a day  lactulose Syrup 20 Gram(s) Oral every 12 hours  mirtazapine 7.5 milliGRAM(s) Oral at bedtime  multivitamin/minerals 1 Tablet(s) Oral daily  polyethylene glycol 3350 17 Gram(s) Oral daily  senna 2 Tablet(s) Oral at bedtime  tamsulosin 0.4 milliGRAM(s) Oral at bedtime    MEDICATIONS  (PRN):  acetaminophen     Tablet .. 650 milliGRAM(s) Oral every 6 hours PRN Temp greater or equal to 38C (100.4F), Mild Pain (1 - 3), Moderate Pain (4 - 6)  albuterol    90 MICROgram(s) HFA Inhaler 2 Puff(s) Inhalation every 6 hours PRN Shortness of Breath and/or Wheezing  guaifenesin/dextromethorphan Oral Liquid 10 milliLiter(s) Oral every 4 hours PRN Cough

## 2023-06-03 NOTE — DIETITIAN INITIAL EVALUATION ADULT - PERTINENT LABORATORY DATA
06-02    145  |  115<H>  |  31<H>  ----------------------------<  85  4.2   |  24  |  1.54<H>    Ca    8.0<L>      02 Jun 2023 06:20    TPro  6.8  /  Alb  2.2<L>  /  TBili  0.5  /  DBili  x   /  AST  20  /  ALT  19  /  AlkPhos  83  06-02

## 2023-06-03 NOTE — DIETITIAN INITIAL EVALUATION ADULT - OTHER INFO
Pt is a 77 yo male with a pmh/o anemia, BPH, depression, dementia with behavioral disturbance, UTI, who presents from the [facility] due to cough with fever that began yesterday afternoon. Per staff, pt has been combative and not allowing for vitals or labs to be performed. Pt sent to r/o PNA and sepsis.     Admit dx    PNA  Unable to get bedscale wt.    EMR wt   73 kg   160#  NFPE done visually  as pt has been aggressive towards nursing staff, lashing out and swinging his arm  Pt was asleep on interview  Visual NFPE reveals muscle wasting, fat wasting   PO intake estimated < 75% ENN > one month   At , pt eating well  Receives Ensure plus  Recommendations to follow in Plan/Intervention

## 2023-06-03 NOTE — DIETITIAN NUTRITION RISK NOTIFICATION - TREATMENT: THE FOLLOWING DIET HAS BEEN RECOMMENDED
Diet, Regular:   Soft and Bite Sized (SOFTBTSZ)  Liquid Protein Supplement     Qty per Day:  2  Supplement Feeding Modality:  Oral  Ensure Plus High Protein Cans or Servings Per Day:  1       Frequency:  Three Times a day (06-01-23 @ 21:14) [Active]

## 2023-06-03 NOTE — DIETITIAN INITIAL EVALUATION ADULT - PHYSCIAL ASSESSMENT
muscle wasting, fat wasting muscle wasting, fat wasting moderate, will attempt to reassess when safer

## 2023-06-03 NOTE — DIETITIAN NUTRITION RISK NOTIFICATION - ADDITIONAL COMMENTS/DIETITIAN RECOMMENDATIONS
Maintain Soft and bite sized diet  Record PO intake in EMR after each meal (nursing.)   MVI w/ minerals daily to ensure 100% RDA met  Consider adding thiamine 100 mg daily 2/2 poor PO intake/ malnutrition  Suggest add Vit C 500 mg BID, add Zinc Sulfate 220 mg x 10 days to promote wound healing  LPS bid  Monitor PO intake, tolerance, labs and weight.

## 2023-06-04 PROCEDURE — 99232 SBSQ HOSP IP/OBS MODERATE 35: CPT

## 2023-06-04 RX ADMIN — SENNA PLUS 2 TABLET(S): 8.6 TABLET ORAL at 21:14

## 2023-06-04 RX ADMIN — CEFEPIME 1000 MILLIGRAM(S): 1 INJECTION, POWDER, FOR SOLUTION INTRAMUSCULAR; INTRAVENOUS at 09:57

## 2023-06-04 RX ADMIN — Medication 325 MILLIGRAM(S): at 09:57

## 2023-06-04 RX ADMIN — POLYETHYLENE GLYCOL 3350 17 GRAM(S): 17 POWDER, FOR SOLUTION ORAL at 09:58

## 2023-06-04 RX ADMIN — TAMSULOSIN HYDROCHLORIDE 0.4 MILLIGRAM(S): 0.4 CAPSULE ORAL at 21:13

## 2023-06-04 RX ADMIN — LACTULOSE 20 GRAM(S): 10 SOLUTION ORAL at 21:13

## 2023-06-04 RX ADMIN — PREGABALIN 1000 MICROGRAM(S): 225 CAPSULE ORAL at 09:57

## 2023-06-04 RX ADMIN — Medication 1 TABLET(S): at 09:57

## 2023-06-04 RX ADMIN — LACTULOSE 20 GRAM(S): 10 SOLUTION ORAL at 09:57

## 2023-06-04 RX ADMIN — MIRTAZAPINE 7.5 MILLIGRAM(S): 45 TABLET, ORALLY DISINTEGRATING ORAL at 21:14

## 2023-06-04 RX ADMIN — Medication 325 MILLIGRAM(S): at 21:14

## 2023-06-04 RX ADMIN — CEFEPIME 1000 MILLIGRAM(S): 1 INJECTION, POWDER, FOR SOLUTION INTRAMUSCULAR; INTRAVENOUS at 21:13

## 2023-06-05 ENCOUNTER — TRANSCRIPTION ENCOUNTER (OUTPATIENT)
Age: 78
End: 2023-06-05

## 2023-06-05 PROCEDURE — 99239 HOSP IP/OBS DSCHRG MGMT >30: CPT

## 2023-06-05 RX ADMIN — LACTULOSE 20 GRAM(S): 10 SOLUTION ORAL at 21:09

## 2023-06-05 RX ADMIN — Medication 325 MILLIGRAM(S): at 10:29

## 2023-06-05 RX ADMIN — Medication 325 MILLIGRAM(S): at 21:09

## 2023-06-05 RX ADMIN — CEFEPIME 1000 MILLIGRAM(S): 1 INJECTION, POWDER, FOR SOLUTION INTRAMUSCULAR; INTRAVENOUS at 21:09

## 2023-06-05 RX ADMIN — CEFEPIME 1000 MILLIGRAM(S): 1 INJECTION, POWDER, FOR SOLUTION INTRAMUSCULAR; INTRAVENOUS at 10:28

## 2023-06-05 RX ADMIN — MIRTAZAPINE 7.5 MILLIGRAM(S): 45 TABLET, ORALLY DISINTEGRATING ORAL at 21:10

## 2023-06-05 RX ADMIN — SENNA PLUS 2 TABLET(S): 8.6 TABLET ORAL at 21:10

## 2023-06-05 RX ADMIN — Medication 1 TABLET(S): at 10:29

## 2023-06-05 RX ADMIN — TAMSULOSIN HYDROCHLORIDE 0.4 MILLIGRAM(S): 0.4 CAPSULE ORAL at 21:10

## 2023-06-05 RX ADMIN — PREGABALIN 1000 MICROGRAM(S): 225 CAPSULE ORAL at 10:29

## 2023-06-05 NOTE — DISCHARGE NOTE PROVIDER - NSDCCPCAREPLAN_GEN_ALL_CORE_FT
PRINCIPAL DISCHARGE DIAGNOSIS  Diagnosis: Parainfluenza infection  Assessment and Plan of Treatment: Resolving  supportive care  completed 5 days antibiotics IV for probable superimposed bacterial pneumonia  continue home meds  return to [facility]

## 2023-06-05 NOTE — DISCHARGE NOTE PROVIDER - HOSPITAL COURSE
CC: AMS  History of Present Illness:   Pt is a 79 yo male with a pmh/o anemia, BPH, depression, dementia with behavioral disturbance, UTI, who presents from the [facility] due to cough with fever that began yesterday afternoon. Per staff, pt has been combative and not allowing for vitals or labs to be performed. Pt sent to r/o PNA and sepsis.     Hospital course:   Pt treated with acute Infectious encephalopathy due to viral parainfluenza with superimposed bacterial PNA complicated by sepsis.  Pt treated with 5 days cefepime.  Provided supportive care for parainfluenza virus, s/p IVFs.  Pt at his baseline.  Pt also with anemia, likely chronic disease, occult blood negative.  Pt declined further blood work and at times refusing meds.  He is at his baseline, HD stable, afebrile, breathing well on room air. He can be discharged back to [facility].  Voice msg left for HCP.    REVIEW OF SYSTEMS: All other review of systems is negative unless indicated above.    Vital Signs Last 24 Hrs  T(C): 36.4 (05 Jun 2023 08:08), Max: 37.1 (04 Jun 2023 15:38)  T(F): 97.5 (05 Jun 2023 08:08), Max: 98.8 (04 Jun 2023 15:38)  HR: 61 (05 Jun 2023 08:08) (61 - 72)  BP: 136/67 (05 Jun 2023 08:08) (118/71 - 136/67)  BP(mean): --  RR: 18 (05 Jun 2023 08:08) (18 - 18)  SpO2: 98% (05 Jun 2023 08:08) (97% - 100%)    Parameters below as of 05 Jun 2023 08:08  Patient On (Oxygen Delivery Method): room air    Physical Exam:  · Constitutional Comments  NAD, confused, weak appearing  · Eyes  EOMI; conjunctiva clear  · ENMT Comments  poor dentition  · Respiratory  respirations non-labored; diminished breath sounds b/l  · Cardiovascular  regular rate and rhythm  · Gastrointestinal  soft; nontender; non distended  · Mental Status  alert, not oriented to place/time/situation , only oriented to self  · Cranial Nerve  no gross focal deficit noted on exam  · Motor  5/5 throughout          · Neurological Comments  exam limited due to pt inability to follow commands and dementia with behavioral disturbance, no gross neurological deficits appreciated on exam  · Skin  warm and dry      · Psychiatric  confused    Assessment/Plan:  79 yo male with a pmh/o anemia, BPH, depression, dementia with behavioral disturbance, UTI, who presents from the [facility] due to cough with fever that began yesterday afternoon. Per staff, pt has been combative and not allowing for vitals or labs to be performed. Pt sent to r/o PNA and sepsis.     #Acute Infectious encephalopathy due to viral parainfluenza with superimposed bacterial PNA complicated by sepsis: RESOLVED  -completed 5 days of cefepime  -s/p vanco  -Parainfluenza positive   -s/p 2300 cc IVF bolus  -BCx neg x 2  -supportive care    #Acute anemia: Likely slow drop, most likely chronic disease   -no obvious signs/symptoms of bleeding  H/h baseline 10/32-> today 8.7/28.7  -Occult blood neg  -Hold chemical dvt ppx  -c/w ferrous sulfate    #CKDIII: STABLE    #severer Protein calorie malnutrition  Dietary consult  c/w mechanical soft with ensure plus TID and LPS BID    #Dementia with behavioral disturbance / Depression:  -fall/aspiration precautions  -c/w remeron  -supportive care    #BPH  -c/w flomax  #Chronic constipation  c/w lactulose bid, hold if BM within last 24 hrs  c/w miralax and senna qhs- hold for loose stool    d/c back to [facility]   Attending Statement: 40 minutes spent on total encounter and discharge planning.           CC: AMS  History of Present Illness:   Pt is a 79 yo male with a pmh/o anemia, BPH, depression, dementia with behavioral disturbance, UTI, who presents from the [facility] due to cough with fever that began yesterday afternoon. Per staff, pt has been combative and not allowing for vitals or labs to be performed. Pt sent to r/o PNA and sepsis.     Hospital course:   Pt treated with acute Infectious encephalopathy due to viral parainfluenza with superimposed bacterial PNA complicated by sepsis.  Pt treated with 5 days cefepime.  Provided supportive care for parainfluenza virus, s/p IVFs.  Pt at his baseline.  Pt also with anemia, likely chronic disease, occult blood negative.  Pt declined further blood work and at times refusing meds.  He is at his baseline, HD stable, afebrile, breathing well on room air. He can be discharged back to [facility].  Voice msg left for HCP.    REVIEW OF SYSTEMS: All other review of systems is negative unless indicated above.      Vital Signs Last 24 Hrs  T(C): 37.1 (08 Jun 2023 09:01), Max: 37.1 (08 Jun 2023 09:01)  T(F): 98.7 (08 Jun 2023 09:01), Max: 98.7 (08 Jun 2023 09:01)  HR: 70 (08 Jun 2023 09:01) (70 - 79)  BP: 120/78 (08 Jun 2023 09:01) (99/61 - 120/78)  BP(mean): --  RR: 18 (08 Jun 2023 09:01) (18 - 18)  SpO2: 95% (08 Jun 2023 09:01) (95% - 98%)    Parameters below as of 08 Jun 2023 09:01  Patient On (Oxygen Delivery Method): room air        Physical Exam:  · Constitutional Comments NAD, confused, weak appearing  · Eyes EOMI; conjunctiva clear  · ENMT Comments poor dentition  · Respiratory respirations non-labored; diminished breath sounds b/l  · Cardiovascular regular rate and rhythm  · Gastrointestinal soft; nontender; non distended  · Mental Status alert, not oriented to place/time/situation , only oriented to self  · Cranial Nerve no gross focal deficit noted on exam  · Motor 5/5 throughout  · Neurological Comments  exam limited due to pt inability to follow commands and dementia with behavioral disturbance, no gross neurological deficits appreciated on exam  · Skin warm and dry  · Psychiatric confused    Assessment/Plan:  79 yo male with a pmh/o anemia, BPH, depression, dementia with behavioral disturbance, UTI, who presents from the [facility] due to cough with fever that began yesterday afternoon. Per staff, pt has been combative and not allowing for vitals or labs to be performed. Pt sent to r/o PNA and sepsis.     #Acute Infectious encephalopathy due to viral parainfluenza with superimposed bacterial PNA complicated by sepsis: RESOLVED  -completed 5 days of cefepime  -s/p vanco  -Parainfluenza positive   -s/p 2300 cc IVF bolus  -BCx neg x 2  -supportive care    #Acute anemia: Likely slow drop, most likely chronic disease   -no obvious signs/symptoms of bleeding  H/h baseline 10/32-> today 8.7/28.7  -Occult blood neg  -Hold chemical dvt ppx  -c/w ferrous sulfate    #CKDIII: STABLE    #severer Protein calorie malnutrition  Dietary consult  c/w mechanical soft with ensure plus TID and LPS BID    #Dementia with behavioral disturbance / Depression:  -fall/aspiration precautions  -c/w remeron  -supportive care    #BPH  -c/w flomax  #Chronic constipation  c/w lactulose bid, hold if BM within last 24 hrs  c/w miralax and senna qhs- hold for loose stool    d/c back to [facility]   Attending Statement: 40 minutes spent on total encounter and discharge planning.

## 2023-06-05 NOTE — DISCHARGE NOTE PROVIDER - DETAILS OF MALNUTRITION DIAGNOSIS/DIAGNOSES
This patient has been assessed with a concern for Malnutrition and was treated during this hospitalization for the following Nutrition diagnosis/diagnoses:     -  06/03/2023: Moderate protein-calorie malnutrition

## 2023-06-05 NOTE — DISCHARGE NOTE PROVIDER - NSDCMRMEDTOKEN_GEN_ALL_CORE_FT
cyanocobalamin 1000 mcg oral tablet: 1 tab(s) orally once a day  docusate sodium 100 mg oral capsule: 1 cap(s) orally 2 times a day  ferrous sulfate 325 mg (65 mg elemental iron) oral tablet: 1 tab(s) orally 2 times a day  lactulose 10 g/15 mL oral syrup: 30 milliliter(s) orally every 12 hours (hold if BM within 24 hours)  mirtazapine 7.5 mg oral tablet: 1 tab(s) orally once a day (at bedtime)  Multiple Vitamins with Minerals oral tablet: 1 tab(s) orally once a day  polyethylene glycol 3350 oral powder for reconstitution: 17 gram(s) orally once a day  senna (sennosides) 8.6 mg oral tablet: 2 tab(s) orally once a day (at bedtime)  tamsulosin 0.4 mg oral capsule: 1 cap(s) orally once a day

## 2023-06-06 LAB
CULTURE RESULTS: SIGNIFICANT CHANGE UP
CULTURE RESULTS: SIGNIFICANT CHANGE UP
SPECIMEN SOURCE: SIGNIFICANT CHANGE UP
SPECIMEN SOURCE: SIGNIFICANT CHANGE UP

## 2023-06-06 PROCEDURE — 99232 SBSQ HOSP IP/OBS MODERATE 35: CPT

## 2023-06-06 RX ADMIN — CEFEPIME 1000 MILLIGRAM(S): 1 INJECTION, POWDER, FOR SOLUTION INTRAMUSCULAR; INTRAVENOUS at 15:07

## 2023-06-06 RX ADMIN — CEFEPIME 1000 MILLIGRAM(S): 1 INJECTION, POWDER, FOR SOLUTION INTRAMUSCULAR; INTRAVENOUS at 23:17

## 2023-06-06 NOTE — PROGRESS NOTE ADULT - ASSESSMENT
79 yo male with a pmh/o anemia, BPH, depression, dementia with behavioral disturbance, UTI, who presents from the [facility] due to cough with fever. Per staff, pt has been combative and not allowing for vitals or labs to be performed. Pt sent to r/o PNA and sepsis. Here found to have LA 2.2, RVP positive for parainfluenza 3, imaging remarkable for prominent interstitial markings on both sides no consolidation, was given vancomycin/cefepime for superimposed bacterial coverage.      1. Fever. Viral syndrome. Parainfluenza 3. Superimposed pneumonia. CKD 3  - imaging reviewed  - on cefepime [id number] #6  - continue with abx coverage  - f/u cultures-  urine cx blood cx no growth   - monitor temps  - aspiration precautions  - on dc po ceftin complete 7-10 day course   - fu cbc   - isolation precautions  - supportive care    2. other issues - care per medicine 
79 yo male with a pmh/o anemia, BPH, depression, dementia with behavioral disturbance, UTI, who presents from the [facility] due to cough with fever. Per staff, pt has been combative and not allowing for vitals or labs to be performed. Pt sent to r/o PNA and sepsis. Here found to have LA 2.2, RVP positive for parainfluenza 3, imaging remarkable for prominent interstitial markings on both sides no consolidation, was given vancomycin/cefepime for superimposed bacterial coverage.      1. Fever. Viral syndrome. Parainfluenza 3. Superimposed pneumonia. CKD 3  - imaging reviewed  - on cefepime [id number] #5, complete 7 days  - continue with abx coverage  - f/u cultures-  urine cx blood cx no growth   - monitor temps  - aspiration precautions  - fu cbc   - isolation precautions  - supportive care    2. other issues - care per medicine

## 2023-06-07 PROCEDURE — 99232 SBSQ HOSP IP/OBS MODERATE 35: CPT

## 2023-06-07 RX ORDER — QUETIAPINE FUMARATE 200 MG/1
25 TABLET, FILM COATED ORAL
Refills: 0 | Status: DISCONTINUED | OUTPATIENT
Start: 2023-06-07 | End: 2023-06-08

## 2023-06-07 NOTE — PROGRESS NOTE ADULT - NUTRITIONAL ASSESSMENT
This patient has been assessed with a concern for Malnutrition and has been determined to have a diagnosis/diagnoses of Moderate protein-calorie malnutrition.    This patient is being managed with:   Diet Regular-  Soft and Bite Sized (SOFTBTSZ)  Liquid Protein Supplement     Qty per Day:  2  Supplement Feeding Modality:  Oral  Ensure Plus High Protein Cans or Servings Per Day:  1       Frequency:  Three Times a day  Entered: Jun 1 2023  9:09PM  

## 2023-06-07 NOTE — PROGRESS NOTE ADULT - REASON FOR ADMISSION
acute infectious encephalopathy due to HCAP and complicated by sepsis

## 2023-06-07 NOTE — PHYSICAL THERAPY INITIAL EVALUATION ADULT - DIAGNOSIS, PT EVAL
acute metabolic encephalopathy associated with acute parainfluenza viral infection complicated by sepsis /bacterial PNA , ,superimposed on dementia with behavioral disturbances

## 2023-06-07 NOTE — PHYSICAL THERAPY INITIAL EVALUATION ADULT - PERTINENT HX OF CURRENT PROBLEM, REHAB EVAL
77 yo male with a pmh/o anemia, BPH, depression, dementia with behavioral disturbance, UTI, who [facility] due to cough with fever that began yesterday afternoon. Per staff, pt has been combative and not allowing for vitals or labs to be performed. Pt sent to r/o PNA and sepsis.

## 2023-06-07 NOTE — PROGRESS NOTE ADULT - SUBJECTIVE AND OBJECTIVE BOX
CC: AMS  History of Present Illness:   Pt is a 77 yo male with a pmh/o anemia, BPH, depression, dementia with behavioral disturbance, UTI, who presents from the [facility] due to cough with fever that began yesterday afternoon. Per staff, pt has been combative and not allowing for vitals or labs to be performed. Pt sent to r/o PNA and sepsis.       S:  6/2: Lying in bed, completely confused, not answering questions appropriately, weak, lethargic appearing.  /3: In bed, calm, comfortable.  Pt refuses meds at times, refusing blood draws.      ROS: Unable to obtain due to AMS    Vital Signs Last 24 Hrs  T(C): 37.1 (2023 21:30), Max: 37.1 (2023 21:30)  T(F): 98.8 (2023 21:30), Max: 98.8 (2023 21:30)  HR: 85 (2023 21:30) (75 - 95)  BP: 124/85 (2023 07:45) (124/85 - 180/96)  BP(mean): 92 (2023 21:10) (92 - 95)  RR: 18 (2023 21:30) (18 - 20)  SpO2: 100% (2023 21:30) (95% - 100%)    Parameters below as of 2023 07:45  Patient On (Oxygen Delivery Method): room air        Physical Exam:  · Constitutional Comments	NAD, confused, weak appearing  · Eyes	EOMI; conjunctiva clear  · ENMT Comments	poor dentition  · Respiratory	respirations non-labored; diminished breath sounds b/l  · Cardiovascular	regular rate and rhythm  · Gastrointestinal	soft; nontender; non distended  · Mental Status	alert, not oriented to place/time/situation , only oriented to self  · Cranial Nerve	no gross focal deficit noted on exam  · Motor	5/5 throughout  	  	  · Neurological Comments	exam limited due to pt inability to follow commands and dementia with behavioral disturbance, no gross neurological deficits appreciated on exam  · Skin	warm and dry  	  · Psychiatric	confused    MEDICATIONS  (STANDING):  cefepime  Injectable. 1000 milliGRAM(s) IV Push every 12 hours  cyanocobalamin 1000 MICROGram(s) Oral daily  ferrous    sulfate 325 milliGRAM(s) Oral two times a day  lactulose Syrup 20 Gram(s) Oral every 12 hours  mirtazapine 7.5 milliGRAM(s) Oral at bedtime  multivitamin/minerals 1 Tablet(s) Oral daily  polyethylene glycol 3350 17 Gram(s) Oral daily  senna 2 Tablet(s) Oral at bedtime  tamsulosin 0.4 milliGRAM(s) Oral at bedtime    MEDICATIONS  (PRN):  acetaminophen     Tablet .. 650 milliGRAM(s) Oral every 6 hours PRN Temp greater or equal to 38C (100.4F), Mild Pain (1 - 3), Moderate Pain (4 - 6)  albuterol    90 MICROgram(s) HFA Inhaler 2 Puff(s) Inhalation every 6 hours PRN Shortness of Breath and/or Wheezing  guaifenesin/dextromethorphan Oral Liquid 10 milliLiter(s) Oral every 4 hours PRN Cough                                8.8    7.15  )-----------( 307      ( 2023 06:20 )             28.5     06-02    145  |  115<H>  |  31<H>  ----------------------------<  85  4.2   |  24  |  1.54<H>    Ca    8.0<L>      2023 06:20    TPro  6.8  /  Alb  2.2<L>  /  TBili  0.5  /  DBili  x   /  AST  20  /  ALT  19  /  AlkPhos  83  06-02    CAPILLARY BLOOD GLUCOSE        LIVER FUNCTIONS - ( 2023 06:20 )  Alb: 2.2 g/dL / Pro: 6.8 gm/dL / ALK PHOS: 83 U/L / ALT: 19 U/L / AST: 20 U/L / GGT: x           PT/INR - ( 2023 06:20 )   PT: 13.8 sec;   INR: 1.19 ratio         PTT - ( 2023 06:20 )  PTT:30.1 sec  Urinalysis Basic - ( 2023 14:55 )    Color: Yellow / Appearance: Clear / S.010 / pH: x  Gluc: x / Ketone: Negative  / Bili: Negative / Urobili: Negative   Blood: x / Protein: 30 mg/dL / Nitrite: Negative   Leuk Esterase: Trace / RBC: 11-25 /HPF / WBC 3-5 /HPF   Sq Epi: x / Non Sq Epi: x / Bacteria: Occasional          Assessment/Plan:  77 yo male with a pmh/o anemia, BPH, depression, dementia with behavioral disturbance, UTI, who presents from the [facility] due to cough with fever that began yesterday afternoon. Per staff, pt has been combative and not allowing for vitals or labs to be performed. Pt sent to r/o PNA and sepsis.     #Acute Infectious encephalopathy due to viral parainfluenza with superimposed bacterial PNA complicated by sepsis  -c/w IV abx cefepime per ID  -s/p vanco  -Parainfluenza positive   -s/p 2300 cc IVF bolus  -BCx neg x 2  -supportive care      #Acute anemia: Likely slow drop, most likely chronic disease   -no obvious signs/symptoms of bleeding  H/h baseline 10/32-> today 8.7/28.7  -Occult blood neg  -Hold chemical dvt ppx  -c/w ferrous sulfate      #CKDIII: STABLE      #severer Protein calorie malnutrition  Dietary consult  c/w mechanical soft with ensure plus TID and LPS BID      #Dementia with behavioral disturbance / Depression:  -fall/aspiration precautions  -c/w remeron  -supportive care    #BPH  -c/w flomax      #Chronic constipation  c/w lactulose bid, hold if BM within last 24 hrs  c/w miralax and senna qhs- hold for loose stool    SCD for DVT ppx    Dispo:  -d/c planning for Monday.  Voice message left for family.          
CC: AMS  History of Present Illness:   Pt is a 77 yo male with a pmh/o anemia, BPH, depression, dementia with behavioral disturbance, UTI, who presents from the [facility] due to cough with fever that began yesterday afternoon. Per staff, pt has been combative and not allowing for vitals or labs to be performed. Pt sent to r/o PNA and sepsis.       S:  6/2: Lying in bed, completely confused, not answering questions appropriately, weak, lethargic appearing.  6/3: In bed, calm, comfortable.  Pt refuses meds at times, refusing blood draws.  6/4: Agitated at times, other times somnolent and tired appearing.   Comfortable.  No significant overnight events.    ROS: Unable to obtain due to AMS    Vital Signs Last 24 Hrs  T(C): 36.5 (04 Jun 2023 08:25), Max: 36.5 (04 Jun 2023 03:05)  T(F): 97.7 (04 Jun 2023 08:25), Max: 97.7 (04 Jun 2023 03:05)  HR: 73 (04 Jun 2023 08:25) (72 - 88)  BP: 137/72 (04 Jun 2023 08:25) (137/72 - 151/104)  BP(mean): 117 (03 Jun 2023 16:18) (117 - 117)  RR: 18 (04 Jun 2023 08:25) (18 - 18)  SpO2: 98% (04 Jun 2023 08:25) (96% - 98%)    Parameters below as of 04 Jun 2023 08:25  Patient On (Oxygen Delivery Method): room air        Physical Exam:  · Constitutional Comments	NAD, confused, weak appearing  · Eyes	EOMI; conjunctiva clear  · ENMT Comments	poor dentition  · Respiratory	respirations non-labored; diminished breath sounds b/l  · Cardiovascular	regular rate and rhythm  · Gastrointestinal	soft; nontender; non distended  · Mental Status	alert, not oriented to place/time/situation , only oriented to self  · Cranial Nerve	no gross focal deficit noted on exam  · Motor	5/5 throughout  	  	  · Neurological Comments	exam limited due to pt inability to follow commands and dementia with behavioral disturbance, no gross neurological deficits appreciated on exam  · Skin	warm and dry  	  · Psychiatric	confused    MEDICATIONS  (STANDING):  cefepime  Injectable. 1000 milliGRAM(s) IV Push every 12 hours  cyanocobalamin 1000 MICROGram(s) Oral daily  ferrous    sulfate 325 milliGRAM(s) Oral two times a day  lactulose Syrup 20 Gram(s) Oral every 12 hours  mirtazapine 7.5 milliGRAM(s) Oral at bedtime  multivitamin/minerals 1 Tablet(s) Oral daily  polyethylene glycol 3350 17 Gram(s) Oral daily  senna 2 Tablet(s) Oral at bedtime  tamsulosin 0.4 milliGRAM(s) Oral at bedtime    MEDICATIONS  (PRN):  acetaminophen     Tablet .. 650 milliGRAM(s) Oral every 6 hours PRN Temp greater or equal to 38C (100.4F), Mild Pain (1 - 3), Moderate Pain (4 - 6)  albuterol    90 MICROgram(s) HFA Inhaler 2 Puff(s) Inhalation every 6 hours PRN Shortness of Breath and/or Wheezing  guaifenesin/dextromethorphan Oral Liquid 10 milliLiter(s) Oral every 4 hours PRN Cough        Assessment/Plan:  77 yo male with a pmh/o anemia, BPH, depression, dementia with behavioral disturbance, UTI, who presents from the [facility] due to cough with fever that began yesterday afternoon. Per staff, pt has been combative and not allowing for vitals or labs to be performed. Pt sent to r/o PNA and sepsis.     #Acute Infectious encephalopathy due to viral parainfluenza with superimposed bacterial PNA complicated by sepsis  -c/w IV abx cefepime per ID day # 4  -s/p vanco  -Parainfluenza positive   -s/p 2300 cc IVF bolus  -BCx neg x 2  -supportive care      #Acute anemia: Likely slow drop, most likely chronic disease   -no obvious signs/symptoms of bleeding  H/h baseline 10/32-> today 8.7/28.7  -Occult blood neg  -Hold chemical dvt ppx  -c/w ferrous sulfate      #CKDIII: STABLE      #severer Protein calorie malnutrition  Dietary consult  c/w mechanical soft with ensure plus TID and LPS BID      #Dementia with behavioral disturbance / Depression:  -fall/aspiration precautions  -c/w remeron  -supportive care    #BPH  -c/w flomax      #Chronic constipation  c/w lactulose bid, hold if BM within last 24 hrs  c/w miralax and senna qhs- hold for loose stool    SCD for DVT ppx    Dispo:  -d/c planning for Monday.  Voice message left for family.                Assessment and Plan:   Nutritional Assessment:  · Nutritional Assessment	This patient has been assessed with a concern for Malnutrition and has been determined to have a diagnosis/diagnoses of Moderate protein-calorie malnutrition.    This patient is being managed with:   Diet Regular-  Soft and Bite Sized (SOFTBTSZ)  Liquid Protein Supplement     Qty per Day:  2  Supplement Feeding Modality:  Oral  Ensure Plus High Protein Cans or Servings Per Day:  1       Frequency:  Three Times a day  Entered: Jun 1 2023  9:09PM    
CC: AMS  History of Present Illness:   Pt is a 79 yo male with a pmh/o anemia, BPH, depression, dementia with behavioral disturbance, UTI, who presents from the [facility] due to cough with fever that began yesterday afternoon. Per staff, pt has been combative and not allowing for vitals or labs to be performed. Pt sent to r/o PNA and sepsis.     Hospital course:   Pt treated with acute Infectious encephalopathy due to viral parainfluenza with superimposed bacterial PNA complicated by sepsis.  Pt treated with 5 days cefepime.  Provided supportive care for parainfluenza virus, s/p IVFs.  Pt at his baseline.  Pt also with anemia, likely chronic disease, occult blood negative.  Pt declined further blood work and at times refusing meds.  He is at his baseline, HD stable, afebrile, breathing well on room air. He can be discharged back to [facility].  Voice msg left for HCP.    S:  6/6: No new events, status quo, awaiting d/c.    REVIEW OF SYSTEMS: All other review of systems is negative unless indicated above.    Vital Signs Last 24 Hrs  T(C): 36.3 (06 Jun 2023 08:14), Max: 36.9 (05 Jun 2023 15:50)  T(F): 97.3 (06 Jun 2023 08:14), Max: 98.4 (05 Jun 2023 15:50)  HR: 58 (06 Jun 2023 08:14) (58 - 90)  BP: 114/60 (06 Jun 2023 08:14) (114/60 - 120/73)  BP(mean): --  RR: 18 (06 Jun 2023 08:14) (18 - 18)  SpO2: 98% (06 Jun 2023 08:14) (96% - 98%)    Parameters below as of 06 Jun 2023 08:14  Patient On (Oxygen Delivery Method): room air    PHYSICAL EXAM:    Constitutional: NAD, awake and alert, frail, elderly, confused  HEENT: PERR, EOMI, Normal Hearing, MMM  Neck: Soft and supple  Respiratory: Breath sounds are clear bilaterally, No wheezing, rales or rhonchi  Cardiovascular: S1 and S2, regular rate and rhythm, no Murmurs, gallops or rubs  Gastrointestinal: Bowel Sounds present, soft, nontender, nondistended, no guarding, no rebound  Extremities: No peripheral edema  Neurological: A/O x 1 no focal deficits in my limited exam      MEDICATIONS  (STANDING):  cefepime  Injectable. 1000 milliGRAM(s) IV Push every 12 hours  cyanocobalamin 1000 MICROGram(s) Oral daily  ferrous    sulfate 325 milliGRAM(s) Oral two times a day  lactulose Syrup 20 Gram(s) Oral every 12 hours  mirtazapine 7.5 milliGRAM(s) Oral at bedtime  multivitamin/minerals 1 Tablet(s) Oral daily  polyethylene glycol 3350 17 Gram(s) Oral daily  senna 2 Tablet(s) Oral at bedtime  tamsulosin 0.4 milliGRAM(s) Oral at bedtime    MEDICATIONS  (PRN):  acetaminophen     Tablet .. 650 milliGRAM(s) Oral every 6 hours PRN Temp greater or equal to 38C (100.4F), Mild Pain (1 - 3), Moderate Pain (4 - 6)  albuterol    90 MICROgram(s) HFA Inhaler 2 Puff(s) Inhalation every 6 hours PRN Shortness of Breath and/or Wheezing  guaifenesin/dextromethorphan Oral Liquid 10 milliLiter(s) Oral every 4 hours PRN Cough        Assessment/Plan:  79 yo male with a pmh/o anemia, BPH, depression, dementia with behavioral disturbance, UTI, who presents from the [facility] due to cough with fever that began yesterday afternoon. Per staff, pt has been combative and not allowing for vitals or labs to be performed. Pt sent to r/o PNA and sepsis.     #Acute Infectious encephalopathy due to viral parainfluenza with superimposed bacterial PNA complicated by sepsis: RESOLVED  -day 6 gio  -s/p vanco  -Parainfluenza positive   -s/p 2300 cc IVF bolus  -BCx neg x 2  -supportive care    #Acute anemia: Likely slow drop, most likely chronic disease   -no obvious signs/symptoms of bleeding  H/h baseline 10/32-> today 8.7/28.7  -Occult blood neg  -Hold chemical dvt ppx  -c/w ferrous sulfate  -patient refusing blood draws    #CKDIII: STABLE    #severer Protein calorie malnutrition  Dietary consult  c/w mechanical soft with ensure plus TID and LPS BID    #Dementia with behavioral disturbance / Depression:  -fall/aspiration precautions  -c/w remeron  -supportive care    #BPH  -c/w flomax  #Chronic constipation  c/w lactulose bid, hold if BM within last 24 hrs  c/w miralax and senna qhs- hold for loose stool    d/c back to [facility] 
Date of service: 23 @ 16:49    pt seen and examined  on RA  refusing labs  less congestion  no fevers     ROS: no fever or chills; denies dizziness, no HA,  no abdominal pain, no diarrhea or constipation; no dysuria, no urinary frequency, no legs pain, no rashes    MEDICATIONS  (STANDING):  cefepime  Injectable. 1000 milliGRAM(s) IV Push every 12 hours  cyanocobalamin 1000 MICROGram(s) Oral daily  ferrous    sulfate 325 milliGRAM(s) Oral two times a day  lactulose Syrup 20 Gram(s) Oral every 12 hours  mirtazapine 7.5 milliGRAM(s) Oral at bedtime  multivitamin/minerals 1 Tablet(s) Oral daily  polyethylene glycol 3350 17 Gram(s) Oral daily  senna 2 Tablet(s) Oral at bedtime  tamsulosin 0.4 milliGRAM(s) Oral at bedtime    Vital Signs Last 24 Hrs  T(C): 36.4 (2023 08:08), Max: 36.6 (2023 20:57)  T(F): 97.5 (2023 08:08), Max: 97.9 (2023 20:57)  HR: 61 (2023 08:08) (61 - 71)  BP: 136/67 (2023 08:08) (134/72 - 136/67)  BP(mean): --  RR: 18 (2023 08:08) (18 - 18)  SpO2: 98% (2023 08:08) (97% - 98%)    Parameters below as of 2023 08:08  Patient On (Oxygen Delivery Method): room air      PE:  Constitutional: frail looking  HEENT: NC/AT, EOMI, PERRLA, conjunctivae clear; ears and nose atraumatic; pharynx benign  Neck: supple; thyroid not palpable  Back: no tenderness  Respiratory: decreased breath sounds, rhonchi  Cardiovascular: S1S2 regular, no murmurs  Abdomen: soft, not tender, not distended, positive BS; liver and spleen WNL  Genitourinary: no suprapubic tenderness  Lymphatic: no LN palpable  Musculoskeletal: no muscle tenderness, no joint swelling or tenderness  Extremities: no pedal edema  Neurological/ Psychiatric: moving all extremities  Skin: no rashes; no palpable lesions    Labs: all available labs reviewed               Culture - Urine (23 @ 14:55)   Specimen Source: Clean Catch Clean Catch (Midstream)  Culture Results:   <10,000 CFU/mL Normal Urogenital Meg  Culture - Blood (23 @ 12:40)   Specimen Source: .Blood Blood-Peripheral  Culture Results:   No growth to date.     Urinalysis Basic - ( 2023 14:55 )    Color: Yellow / Appearance: Clear / S.010 / pH: x  Gluc: x / Ketone: Negative  / Bili: Negative / Urobili: Negative   Blood: x / Protein: 30 mg/dL / Nitrite: Negative   Leuk Esterase: Trace / RBC: 11-25 /HPF / WBC 3-5 /HPF   Sq Epi: x / Non Sq Epi: x / Bacteria: Occasional      Parainfluenza 3 (RapRVP): Detected    Radiology: all available radiological tests reviewed      ACC: [id number] EXAM:  XR CHEST PORTABLE URGENT 1V   ORDERED BY: KIMBER SHER     PROCEDURE DATE:  2023          INTERPRETATION:  INDICATION: Sepsis    COMPARISON: 2021    FINDINGS:  An AP chest radiograph demonstrates a diffusebilateral reticulonodular   pattern, as a change from the prior exam. There are no focal alveolar   lobar consolidations to indicate lobar pneumonia. There is no   pneumothorax. No pleural fluid is seen. There is no hilar or mediastinal   widening. The cardiac silhouette is not enlarged for the projection.   There is some engorgement of central pulmonary veins but without rafael   pulmonary edema. The bony thorax remains stable.    IMPRESSION:  1. Prominent interstitial markings diffusely on both sides with a   reticulonodular pattern diffusely, but without without focal lobar   alveolar consolidation. This could represent atypical infection,   including viral etiology amongst others.  2. The cardiac silhouette is not enlarged although noting mild central   pulmonary venous engorgement but without specific evidence of pulmonary   edema.        Advanced directives addressed: full resuscitation
CC: AMS  History of Present Illness:   Pt is a 79 yo male with a pmh/o anemia, BPH, depression, dementia with behavioral disturbance, UTI, who presents from the [facility] due to cough with fever that began yesterday afternoon. Per staff, pt has been combative and not allowing for vitals or labs to be performed. Pt sent to r/o PNA and sepsis.       S:  /: Lying in bed, completely confused, not answering questions appropriately, weak, lethargic appearing.    ROS: Unable to obtain due to AMS      Vital Signs Last 24 Hrs  T(C): 36.4 (2023 08:32), Max: 38.8 (2023 20:00)  T(F): 97.5 (2023 08:32), Max: 101.9 (2023 20:00)  HR: 73 (2023 07:30) (73 - 104)  BP: 117/65 (2023 07:30) (117/65 - 147/72)  BP(mean): 80 (2023 07:30) (80 - 80)  RR: 20 (2023 07:30) (18 - 20)  SpO2: 97% (2023 07:30) (94% - 98%)    Parameters below as of 2023 07:30  Patient On (Oxygen Delivery Method): room air        Physical Exam:  · Constitutional Comments	NAD, confused, weak appearing  · Eyes	EOMI; conjunctiva clear  · ENMT Comments	poor dentition  · Respiratory	respirations non-labored; diminished breath sounds b/l, + cough  · Cardiovascular	regular rate and rhythm  · Gastrointestinal	soft; nontender; nondistended  · Mental Status	alert, not oriented to place/time/situation , only oriented to self  · Cranial Nerve	no gross focal deficit noted on exam  · Motor	5/5 throughout  · Sensory	unable to assess due to dementia, pt unable uncooperative  	  · Neurological Comments	exam limited due to pt inability to follow commands and dementia with behavioral disturbance, no gross neurological deficits appreciated on exam  · Skin	warm and dry  	  · Psychiatric	confused      MEDICATIONS  (STANDING):  cefepime  Injectable. 1000 milliGRAM(s) IV Push every 12 hours  cyanocobalamin 1000 MICROGram(s) Oral daily  ferrous    sulfate 325 milliGRAM(s) Oral two times a day  lactulose Syrup 20 Gram(s) Oral every 12 hours  mirtazapine 7.5 milliGRAM(s) Oral at bedtime  multivitamin/minerals 1 Tablet(s) Oral daily  polyethylene glycol 3350 17 Gram(s) Oral daily  senna 2 Tablet(s) Oral at bedtime  tamsulosin 0.4 milliGRAM(s) Oral at bedtime    MEDICATIONS  (PRN):  acetaminophen     Tablet .. 650 milliGRAM(s) Oral every 6 hours PRN Temp greater or equal to 38C (100.4F), Mild Pain (1 - 3), Moderate Pain (4 - 6)  albuterol    90 MICROgram(s) HFA Inhaler 2 Puff(s) Inhalation every 6 hours PRN Shortness of Breath and/or Wheezing  guaifenesin/dextromethorphan Oral Liquid 10 milliLiter(s) Oral every 4 hours PRN Cough                                  8.8    7.15  )-----------( 307      ( 2023 06:20 )             28.5     06-02    145  |  115<H>  |  31<H>  ----------------------------<  85  4.2   |  24  |  1.54<H>    Ca    8.0<L>      2023 06:20    TPro  6.8  /  Alb  2.2<L>  /  TBili  0.5  /  DBili  x   /  AST  20  /  ALT  19  /  AlkPhos  83  06-02    CAPILLARY BLOOD GLUCOSE        LIVER FUNCTIONS - ( 2023 06:20 )  Alb: 2.2 g/dL / Pro: 6.8 gm/dL / ALK PHOS: 83 U/L / ALT: 19 U/L / AST: 20 U/L / GGT: x           PT/INR - ( 2023 06:20 )   PT: 13.8 sec;   INR: 1.19 ratio         PTT - ( 2023 06:20 )  PTT:30.1 sec  Urinalysis Basic - ( 2023 14:55 )    Color: Yellow / Appearance: Clear / S.010 / pH: x  Gluc: x / Ketone: Negative  / Bili: Negative / Urobili: Negative   Blood: x / Protein: 30 mg/dL / Nitrite: Negative   Leuk Esterase: Trace / RBC: 11-25 /HPF / WBC 3-5 /HPF   Sq Epi: x / Non Sq Epi: x / Bacteria: Occasional        Assessment/Plan:  79 yo male with a pmh/o anemia, BPH, depression, dementia with behavioral disturbance, UTI, who presents from the [facility] due to cough with fever that began yesterday afternoon. Per staff, pt has been combative and not allowing for vitals or labs to be performed. Pt sent to r/o PNA and sepsis.     #Acute Infectious encephalopathy due to viral parainfluenza with superimposed bacterial PNA complicated by sepsis  -c/w IV abx cefepime per ID  -hold further vanco  -Parainfluenza positive   -s/p 2300 cc IVF bolus  -f/u blood and urine cultures  -supportive care  -neurochecks for now         #Acute anemia: Likely slow drop, most likely chronic disease   -no obvious signs/symptoms of bleeding  H/h baseline 10/32-> today 8.7/28.7  -f/u Occult blood   -Hold chemical dvt ppx  -c/w ferrous sulfate  -f/u anemia panel if able to collect      #CKDIII: STABLE      #Protein calorie malnutrition  Dietary consult  c/w mechanical soft with ensure plus TID and LPS BID      #Dementia with behavioral disturbance / Depression:  -fall/aspiration precautions  -c/w remeron  -supportive care    #BPH  -c/w flomax      #Chronic constipation  c/w lactulose bid, hold if BM within last 24 hrs  c/w miralax and senna qhs- hold for loose stool    SCD for DVT ppx      
CC: AMS  History of Present Illness:   Pt is a 77 yo male with a pmh/o anemia, BPH, depression, dementia with behavioral disturbance, UTI, who presents from the [facility] rehab due to cough with fever that began yesterday afternoon. Per staff, pt has been combative and not allowing for vitals or labs to be performed. Pt sent to r/o PNA and sepsis.     Hospital course:   Pt treated with acute Infectious encephalopathy due to viral parainfluenza with superimposed bacterial PNA complicated by sepsis.  Pt treated with 5 days cefepime.  Provided supportive care for parainfluenza virus, s/p IVFs.  Pt at his baseline.  Pt also with anemia, likely chronic disease, occult blood negative.  Pt declined further blood work and at times refusing meds.  He is at his baseline, HD stable, afebrile, breathing well on room air. He can be discharged back to [facility].  Voice msg left for HCP.    S:  6/6: No new events, status quo, awaiting d/c.  6/7: Awake, alert, confused, agitated at times. Spoke to nephew and updated on plan of care.  NO fever, chills, n, v.    REVIEW OF SYSTEMS: All other review of systems is negative unless indicated above.    Vital Signs Last 24 Hrs  T(C): 36.4 (07 Jun 2023 07:58), Max: 36.6 (06 Jun 2023 23:35)  T(F): 97.5 (07 Jun 2023 07:58), Max: 97.9 (06 Jun 2023 23:35)  HR: 60 (07 Jun 2023 07:58) (60 - 71)  BP: 118/65 (07 Jun 2023 07:58) (106/67 - 118/65)  BP(mean): --  RR: 18 (07 Jun 2023 07:58) (18 - 18)  SpO2: 98% (07 Jun 2023 07:58) (96% - 98%)    Parameters below as of 07 Jun 2023 07:58  Patient On (Oxygen Delivery Method): room air    PHYSICAL EXAM:    Constitutional: NAD, awake and alert, frail, elderly, confused  HEENT: PERR, EOMI, Normal Hearing, MMM  Neck: Soft and supple  Respiratory: Breath sounds are clear bilaterally, No wheezing, rales or rhonchi  Cardiovascular: S1 and S2, regular rate and rhythm, no Murmurs, gallops or rubs  Gastrointestinal: Bowel Sounds present, soft, nontender, nondistended, no guarding, no rebound  Extremities: No peripheral edema  Neurological: A/O x 1 no focal deficits in my limited exam      med/labs: Reviewed and interpreted       Assessment/Plan:  77 yo male with a pmh/o anemia, BPH, depression, dementia with behavioral disturbance, UTI, who presents from the [facility] due to cough with fever that began yesterday afternoon. Per staff, pt has been combative and not allowing for vitals or labs to be performed. Pt sent to r/o PNA and sepsis.     #Acute Infectious encephalopathy due to viral parainfluenza with superimposed bacterial PNA complicated by sepsis: RESOLVED  -day 7 meropenem, stop further.  -s/p vanco  -Parainfluenza positive   -s/p 2300 cc IVF bolus  -BCx neg x 2  -supportive care    #Acute anemia: Likely slow drop, most likely chronic disease   -no obvious signs/symptoms of bleeding  -H/H baseline 10/32-> today 8.7/28.7  -Occult blood neg  -Hold chemical dvt ppx  -c/w ferrous sulfate  -patient refusing blood draws    #CKDIII: STABLE    #severer Protein calorie malnutrition  Dietary consult  c/w mechanical soft with ensure plus TID and LPS BID    #Dementia with behavioral disturbance / Depression:  -fall/aspiration precautions  -c/w remeron  -supportive care  -Seroquel PRN agitation    #BPH  -c/w flomax  #Chronic constipation  c/w lactulose bid, hold if BM within last 24 hrs  c/w miralax and senna qhs- hold for loose stool    Dispo:  d/c back to [facility] pending PT eval and acceptance.  Spoke to nephew 6/7 and in agreement with plan of care.
Date of service: 23 @ 11:50    pt seen and examined  no resp distress  comfortable   no fevers     ROS: no fever or chills; denies dizziness, no HA,  no abdominal pain, no diarrhea or constipation; no dysuria, no urinary frequency, no legs pain, no rashes    MEDICATIONS  (STANDING):  cefepime  Injectable. 1000 milliGRAM(s) IV Push every 12 hours  cyanocobalamin 1000 MICROGram(s) Oral daily  ferrous    sulfate 325 milliGRAM(s) Oral two times a day  lactulose Syrup 20 Gram(s) Oral every 12 hours  mirtazapine 7.5 milliGRAM(s) Oral at bedtime  multivitamin/minerals 1 Tablet(s) Oral daily  polyethylene glycol 3350 17 Gram(s) Oral daily  senna 2 Tablet(s) Oral at bedtime  tamsulosin 0.4 milliGRAM(s) Oral at bedtime    Vital Signs Last 24 Hrs  T(C): 36.3 (2023 08:14), Max: 36.9 (2023 15:50)  T(F): 97.3 (2023 08:14), Max: 98.4 (2023 15:50)  HR: 58 (2023 08:14) (58 - 90)  BP: 114/60 (2023 08:14) (114/60 - 120/73)  BP(mean): --  RR: 18 (2023 08:14) (18 - 18)  SpO2: 98% (2023 08:14) (96% - 98%)    Parameters below as of 2023 08:14  Patient On (Oxygen Delivery Method): room air        PE:  Constitutional: frail looking  HEENT: NC/AT, EOMI, PERRLA, conjunctivae clear; ears and nose atraumatic; pharynx benign  Neck: supple; thyroid not palpable  Back: no tenderness  Respiratory: decreased breath sounds, rhonchi  Cardiovascular: S1S2 regular, no murmurs  Abdomen: soft, not tender, not distended, positive BS; liver and spleen WNL  Genitourinary: no suprapubic tenderness  Lymphatic: no LN palpable  Musculoskeletal: no muscle tenderness, no joint swelling or tenderness  Extremities: no pedal edema  Neurological/ Psychiatric: moving all extremities  Skin: no rashes; no palpable lesions    Labs: all available labs reviewed            Culture - Urine (23 @ 14:55)   Specimen Source: Clean Catch Clean Catch (Midstream)  Culture Results:   <10,000 CFU/mL Normal Urogenital Meg  Culture - Blood (23 @ 12:40)   Specimen Source: .Blood Blood-Peripheral  Culture Results:   No growth to date.     Urinalysis Basic - ( 2023 14:55 )    Color: Yellow / Appearance: Clear / S.010 / pH: x  Gluc: x / Ketone: Negative  / Bili: Negative / Urobili: Negative   Blood: x / Protein: 30 mg/dL / Nitrite: Negative   Leuk Esterase: Trace / RBC: 11-25 /HPF / WBC 3-5 /HPF   Sq Epi: x / Non Sq Epi: x / Bacteria: Occasional      Parainfluenza 3 (RapRVP): Detected    Radiology: all available radiological tests reviewed      ACC: [id number] EXAM:  XR CHEST PORTABLE URGENT 1V   ORDERED BY: KIMBER SHER     PROCEDURE DATE:  2023          INTERPRETATION:  INDICATION: Sepsis    COMPARISON: 2021    FINDINGS:  An AP chest radiograph demonstrates a diffusebilateral reticulonodular   pattern, as a change from the prior exam. There are no focal alveolar   lobar consolidations to indicate lobar pneumonia. There is no   pneumothorax. No pleural fluid is seen. There is no hilar or mediastinal   widening. The cardiac silhouette is not enlarged for the projection.   There is some engorgement of central pulmonary veins but without rafael   pulmonary edema. The bony thorax remains stable.    IMPRESSION:  1. Prominent interstitial markings diffusely on both sides with a   reticulonodular pattern diffusely, but without without focal lobar   alveolar consolidation. This could represent atypical infection,   including viral etiology amongst others.  2. The cardiac silhouette is not enlarged although noting mild central   pulmonary venous engorgement but without specific evidence of pulmonary   edema.        Advanced directives addressed: full resuscitation

## 2023-06-08 ENCOUNTER — TRANSCRIPTION ENCOUNTER (OUTPATIENT)
Age: 78
End: 2023-06-08

## 2023-06-08 VITALS
HEART RATE: 90 BPM | TEMPERATURE: 98 F | DIASTOLIC BLOOD PRESSURE: 82 MMHG | SYSTOLIC BLOOD PRESSURE: 122 MMHG | OXYGEN SATURATION: 96 % | RESPIRATION RATE: 18 BRPM

## 2023-06-08 PROCEDURE — 99239 HOSP IP/OBS DSCHRG MGMT >30: CPT

## 2023-06-08 RX ADMIN — QUETIAPINE FUMARATE 25 MILLIGRAM(S): 200 TABLET, FILM COATED ORAL at 15:35

## 2023-06-08 RX ADMIN — QUETIAPINE FUMARATE 25 MILLIGRAM(S): 200 TABLET, FILM COATED ORAL at 03:05

## 2023-06-08 NOTE — DISCHARGE NOTE NURSING/CASE MANAGEMENT/SOCIAL WORK - NSDCVIVACCINE_GEN_ALL_CORE_FT
Tdap; 08-Feb-2021 18:55; Luis Armando Michael (RN); [employer]; [id number] (Exp. Date: 21-Jul-2022); IntraMuscular; Deltoid Right.; 0.5 milliLiter(s); VIS (VIS Published: 09-May-2013, VIS Presented: 08-Feb-2021);

## 2023-06-08 NOTE — DISCHARGE NOTE NURSING/CASE MANAGEMENT/SOCIAL WORK - PATIENT PORTAL LINK FT
You can access the FollowMyHealth Patient Portal offered by [facility] by registering at the following website: [email or web address]. By joining [facility]’s FollowMyHealth portal, you will also be able to view your health information using other applications (apps) compatible with our system.

## 2023-06-08 NOTE — DISCHARGE NOTE NURSING/CASE MANAGEMENT/SOCIAL WORK - NSDCPEFALRISK_GEN_ALL_CORE
For information on Fall & Injury Prevention, visit: [email or web address] OR  [email or web address] OR  https://www.cdc.gov/steadi/patient.html

## 2023-06-13 DIAGNOSIS — J15.9 UNSPECIFIED BACTERIAL PNEUMONIA: ICD-10-CM

## 2023-06-13 DIAGNOSIS — Z79.899 OTHER LONG TERM (CURRENT) DRUG THERAPY: ICD-10-CM

## 2023-06-13 DIAGNOSIS — N40.0 BENIGN PROSTATIC HYPERPLASIA WITHOUT LOWER URINARY TRACT SYMPTOMS: ICD-10-CM

## 2023-06-13 DIAGNOSIS — G93.41 METABOLIC ENCEPHALOPATHY: ICD-10-CM

## 2023-06-13 DIAGNOSIS — D63.8 ANEMIA IN OTHER CHRONIC DISEASES CLASSIFIED ELSEWHERE: ICD-10-CM

## 2023-06-13 DIAGNOSIS — Z66 DO NOT RESUSCITATE: ICD-10-CM

## 2023-06-13 DIAGNOSIS — F32.A DEPRESSION, UNSPECIFIED: ICD-10-CM

## 2023-06-13 DIAGNOSIS — N18.30 CHRONIC KIDNEY DISEASE, STAGE 3 UNSPECIFIED: ICD-10-CM

## 2023-06-13 DIAGNOSIS — Z20.822 CONTACT WITH AND (SUSPECTED) EXPOSURE TO COVID-19: ICD-10-CM

## 2023-06-13 DIAGNOSIS — A41.9 SEPSIS, UNSPECIFIED ORGANISM: ICD-10-CM

## 2023-06-13 DIAGNOSIS — B34.8 OTHER VIRAL INFECTIONS OF UNSPECIFIED SITE: ICD-10-CM

## 2023-06-13 DIAGNOSIS — F03.93 UNSPECIFIED DEMENTIA, UNSPECIFIED SEVERITY, WITH MOOD DISTURBANCE: ICD-10-CM

## 2023-06-13 DIAGNOSIS — K59.09 OTHER CONSTIPATION: ICD-10-CM

## 2023-06-13 DIAGNOSIS — Z53.29 PROCEDURE AND TREATMENT NOT CARRIED OUT BECAUSE OF PATIENT'S DECISION FOR OTHER REASONS: ICD-10-CM

## 2023-06-13 DIAGNOSIS — Z78.1 PHYSICAL RESTRAINT STATUS: ICD-10-CM

## 2023-06-13 DIAGNOSIS — E43 UNSPECIFIED SEVERE PROTEIN-CALORIE MALNUTRITION: ICD-10-CM

## 2023-08-18 ENCOUNTER — INPATIENT (INPATIENT)
Facility: HOSPITAL | Age: 78
LOS: 6 days | Discharge: NURSING, REHAB OR LONG-TERM CARE | DRG: 698 | End: 2023-08-25
Attending: SURGERY | Admitting: SURGERY
Payer: MEDICARE

## 2023-08-18 VITALS
RESPIRATION RATE: 18 BRPM | HEART RATE: 103 BPM | SYSTOLIC BLOOD PRESSURE: 124 MMHG | DIASTOLIC BLOOD PRESSURE: 57 MMHG | WEIGHT: 128.09 LBS | OXYGEN SATURATION: 97 % | TEMPERATURE: 101 F | HEIGHT: 69 IN

## 2023-08-18 LAB
ALBUMIN SERPL ELPH-MCNC: 2.9 G/DL — LOW (ref 3.3–5)
ALP SERPL-CCNC: 101 U/L — SIGNIFICANT CHANGE UP (ref 40–120)
ALT FLD-CCNC: 13 U/L — SIGNIFICANT CHANGE UP (ref 12–78)
ANION GAP SERPL CALC-SCNC: 7 MMOL/L — SIGNIFICANT CHANGE UP (ref 5–17)
APPEARANCE UR: ABNORMAL
APTT BLD: 30.4 SEC — SIGNIFICANT CHANGE UP (ref 24.5–35.6)
AST SERPL-CCNC: 28 U/L — SIGNIFICANT CHANGE UP (ref 15–37)
BACTERIA # UR AUTO: ABNORMAL
BASOPHILS # BLD AUTO: 0.04 K/UL — SIGNIFICANT CHANGE UP (ref 0–0.2)
BASOPHILS NFR BLD AUTO: 0.3 % — SIGNIFICANT CHANGE UP (ref 0–2)
BILIRUB SERPL-MCNC: 0.5 MG/DL — SIGNIFICANT CHANGE UP (ref 0.2–1.2)
BILIRUB UR-MCNC: NEGATIVE — SIGNIFICANT CHANGE UP
BUN SERPL-MCNC: 37 MG/DL — HIGH (ref 7–23)
CALCIUM SERPL-MCNC: 8.7 MG/DL — SIGNIFICANT CHANGE UP (ref 8.5–10.1)
CHLORIDE SERPL-SCNC: 114 MMOL/L — HIGH (ref 96–108)
CO2 SERPL-SCNC: 23 MMOL/L — SIGNIFICANT CHANGE UP (ref 22–31)
COLOR SPEC: YELLOW — SIGNIFICANT CHANGE UP
COMMENT - URINE: SIGNIFICANT CHANGE UP
CREAT SERPL-MCNC: 2.72 MG/DL — HIGH (ref 0.5–1.3)
DIFF PNL FLD: ABNORMAL
EGFR: 23 ML/MIN/1.73M2 — LOW
EOSINOPHIL # BLD AUTO: 0 K/UL — SIGNIFICANT CHANGE UP (ref 0–0.5)
EOSINOPHIL NFR BLD AUTO: 0 % — SIGNIFICANT CHANGE UP (ref 0–6)
EPI CELLS # UR: NEGATIVE — SIGNIFICANT CHANGE UP
GLUCOSE SERPL-MCNC: 123 MG/DL — HIGH (ref 70–99)
GLUCOSE UR QL: NEGATIVE — SIGNIFICANT CHANGE UP
HCT VFR BLD CALC: 29.6 % — LOW (ref 39–50)
HGB BLD-MCNC: 9.1 G/DL — LOW (ref 13–17)
IMM GRANULOCYTES NFR BLD AUTO: 0.4 % — SIGNIFICANT CHANGE UP (ref 0–0.9)
INR BLD: 1.22 RATIO — HIGH (ref 0.85–1.18)
KETONES UR-MCNC: NEGATIVE — SIGNIFICANT CHANGE UP
LACTATE SERPL-SCNC: 2.3 MMOL/L — HIGH (ref 0.7–2)
LEUKOCYTE ESTERASE UR-ACNC: ABNORMAL
LYMPHOCYTES # BLD AUTO: 0.65 K/UL — LOW (ref 1–3.3)
LYMPHOCYTES # BLD AUTO: 5.6 % — LOW (ref 13–44)
MCHC RBC-ENTMCNC: 26.8 PG — LOW (ref 27–34)
MCHC RBC-ENTMCNC: 30.7 GM/DL — LOW (ref 32–36)
MCV RBC AUTO: 87.3 FL — SIGNIFICANT CHANGE UP (ref 80–100)
MONOCYTES # BLD AUTO: 1.19 K/UL — HIGH (ref 0–0.9)
MONOCYTES NFR BLD AUTO: 10.2 % — SIGNIFICANT CHANGE UP (ref 2–14)
NEUTROPHILS # BLD AUTO: 9.7 K/UL — HIGH (ref 1.8–7.4)
NEUTROPHILS NFR BLD AUTO: 83.5 % — HIGH (ref 43–77)
NITRITE UR-MCNC: NEGATIVE — SIGNIFICANT CHANGE UP
PH UR: 5 — SIGNIFICANT CHANGE UP (ref 5–8)
PLATELET # BLD AUTO: 229 K/UL — SIGNIFICANT CHANGE UP (ref 150–400)
POTASSIUM SERPL-MCNC: 4.7 MMOL/L — SIGNIFICANT CHANGE UP (ref 3.5–5.3)
POTASSIUM SERPL-SCNC: 4.7 MMOL/L — SIGNIFICANT CHANGE UP (ref 3.5–5.3)
PROT SERPL-MCNC: 7.7 GM/DL — SIGNIFICANT CHANGE UP (ref 6–8.3)
PROT UR-MCNC: 100
PROTHROM AB SERPL-ACNC: 13.7 SEC — HIGH (ref 9.5–13)
RAPID RVP RESULT: SIGNIFICANT CHANGE UP
RBC # BLD: 3.39 M/UL — LOW (ref 4.2–5.8)
RBC # FLD: 16.9 % — HIGH (ref 10.3–14.5)
RBC CASTS # UR COMP ASSIST: ABNORMAL /HPF (ref 0–4)
SARS-COV-2 RNA SPEC QL NAA+PROBE: SIGNIFICANT CHANGE UP
SODIUM SERPL-SCNC: 144 MMOL/L — SIGNIFICANT CHANGE UP (ref 135–145)
SP GR SPEC: 1.01 — SIGNIFICANT CHANGE UP (ref 1.01–1.02)
UROBILINOGEN FLD QL: NEGATIVE — SIGNIFICANT CHANGE UP
WBC # BLD: 11.63 K/UL — HIGH (ref 3.8–10.5)
WBC # FLD AUTO: 11.63 K/UL — HIGH (ref 3.8–10.5)
WBC UR QL: >50 /HPF (ref 0–5)

## 2023-08-18 PROCEDURE — 93010 ELECTROCARDIOGRAM REPORT: CPT

## 2023-08-18 PROCEDURE — 71045 X-RAY EXAM CHEST 1 VIEW: CPT | Mod: 26

## 2023-08-18 PROCEDURE — 70450 CT HEAD/BRAIN W/O DYE: CPT | Mod: 26,MA

## 2023-08-18 PROCEDURE — 99285 EMERGENCY DEPT VISIT HI MDM: CPT

## 2023-08-18 PROCEDURE — 72125 CT NECK SPINE W/O DYE: CPT | Mod: 26,MA

## 2023-08-18 PROCEDURE — 72170 X-RAY EXAM OF PELVIS: CPT | Mod: 26

## 2023-08-18 RX ORDER — SODIUM CHLORIDE 9 MG/ML
1750 INJECTION INTRAMUSCULAR; INTRAVENOUS; SUBCUTANEOUS ONCE
Refills: 0 | Status: COMPLETED | OUTPATIENT
Start: 2023-08-18 | End: 2023-08-18

## 2023-08-18 RX ORDER — CEFTRIAXONE 500 MG/1
1000 INJECTION, POWDER, FOR SOLUTION INTRAMUSCULAR; INTRAVENOUS ONCE
Refills: 0 | Status: COMPLETED | OUTPATIENT
Start: 2023-08-18 | End: 2023-08-18

## 2023-08-18 RX ORDER — ACETAMINOPHEN 500 MG
1000 TABLET ORAL ONCE
Refills: 0 | Status: COMPLETED | OUTPATIENT
Start: 2023-08-18 | End: 2023-08-18

## 2023-08-18 RX ADMIN — CEFTRIAXONE 1000 MILLIGRAM(S): 500 INJECTION, POWDER, FOR SOLUTION INTRAMUSCULAR; INTRAVENOUS at 22:09

## 2023-08-18 RX ADMIN — SODIUM CHLORIDE 1750 MILLILITER(S): 9 INJECTION INTRAMUSCULAR; INTRAVENOUS; SUBCUTANEOUS at 22:09

## 2023-08-18 RX ADMIN — Medication 400 MILLIGRAM(S): at 22:09

## 2023-08-18 NOTE — ED PROVIDER NOTE - CARE PLAN
1 Principal Discharge DX:	Cervical spine fracture  Secondary Diagnosis:	Acute UTI  Secondary Diagnosis:	Sepsis due to urinary tract infection

## 2023-08-18 NOTE — ED ADULT TRIAGE NOTE - ARRIVAL FROM
Nursing home
Constitutional: (-) fever  Eyes/ENT: (-) blurry vision, (-) epistaxis  Cardiovascular: (-) chest pain, (-) syncope  Respiratory: (-) cough, (-) shortness of breath  Gastrointestinal: (-) vomiting, (-) diarrhea  Musculoskeletal: (-) neck pain, (-) back pain, (-) joint pain  Integumentary: (-) rash, (-) edema  Neurological: (-) headache, (-) altered mental status  Psychiatric: (-) hallucinations  Allergic/Immunologic: (-) pruritus

## 2023-08-18 NOTE — ED PROVIDER NOTE - CLINICAL SUMMARY MEDICAL DECISION MAKING FREE TEXT BOX
Jim - assumed care from previous provider pending trauma consultation. Pt seen by trauma, will be admitted to SICU Patient with sepsis 2/2 UTI, and has findings suggestive of screw loosening, possible nonunion fracture or refracture of cervical spine.  In collar.  Appreciate neuro, trauma, and spine consults.  Admit to trauma service.    Jim - assumed care from previous provider pending trauma consultation. Pt seen by trauma, will be admitted to SICU

## 2023-08-18 NOTE — ED PROVIDER NOTE - ENMT, MLM
Airway patent, Nasal mucosa clear. Mouth with normal mucosa. Throat has no vesicles, no oropharyngeal exudates and uvula is midline. Airway patent, Nasal mucosa clear. Mouth with normal mucosa. Throat has no vesicles, no oropharyngeal exudates and uvula is midline. 1 cm scalp laceration on the vertex no bleeding, no infection

## 2023-08-18 NOTE — ED PROVIDER NOTE - CONSTITUTIONAL, MLM
normal... Well appearing, awake, alert, oriented to person, place, time/situation and in no apparent distress. Well appearing, awake, alert, oriented to person, place, time/situation and in no apparent distress. C-collar in place

## 2023-08-18 NOTE — ED PROVIDER NOTE - OBJECTIVE STATEMENT
77 y/o M with a PMHx of dementia, BPH, depression, and anemia presents to the ED s/p fall. the patient is from [place] and was being ambulated to the bathroom when he fell to the floor and struck his head. No LOC. 79 y/o M with a PMHx of dementia, BPH, depression, and anemia presents to the ED with C-collar s/p fall. the patient is from [place] and was being ambulated to the bathroom when he fell to the floor and struck his head. No LOC.

## 2023-08-18 NOTE — ED ADULT TRIAGE NOTE - CHIEF COMPLAINT QUOTE
Patient presents to ED BIBA s/p witnessed fall from a nursing facility. Staff members were helping patient in the bathroom, pt fell onto head, has laceration to top of scalp. no LOC. pt hx of dementia, at baseline mental status. neuro alert called by Dr. Brennan, pt sent to CT scan.

## 2023-08-19 DIAGNOSIS — S12.9XXA FRACTURE OF NECK, UNSPECIFIED, INITIAL ENCOUNTER: ICD-10-CM

## 2023-08-19 PROBLEM — F32.A DEPRESSION, UNSPECIFIED: Chronic | Status: ACTIVE | Noted: 2023-06-01

## 2023-08-19 PROBLEM — F03.90 UNSPECIFIED DEMENTIA WITHOUT BEHAVIORAL DISTURBANCE: Chronic | Status: ACTIVE | Noted: 2023-06-01

## 2023-08-19 PROBLEM — N40.0 BENIGN PROSTATIC HYPERPLASIA WITHOUT LOWER URINARY TRACT SYMPTOMS: Chronic | Status: ACTIVE | Noted: 2023-06-01

## 2023-08-19 PROBLEM — D64.9 ANEMIA, UNSPECIFIED: Chronic | Status: ACTIVE | Noted: 2023-06-01

## 2023-08-19 LAB
ANION GAP SERPL CALC-SCNC: 5 MMOL/L — SIGNIFICANT CHANGE UP (ref 5–17)
BUN SERPL-MCNC: 36 MG/DL — HIGH (ref 7–23)
CALCIUM SERPL-MCNC: 7.9 MG/DL — LOW (ref 8.5–10.1)
CHLORIDE SERPL-SCNC: 117 MMOL/L — HIGH (ref 96–108)
CO2 SERPL-SCNC: 21 MMOL/L — LOW (ref 22–31)
CREAT SERPL-MCNC: 2.51 MG/DL — HIGH (ref 0.5–1.3)
EGFR: 26 ML/MIN/1.73M2 — LOW
GLUCOSE SERPL-MCNC: 101 MG/DL — HIGH (ref 70–99)
HCT VFR BLD CALC: 25.3 % — LOW (ref 39–50)
HGB BLD-MCNC: 7.8 G/DL — LOW (ref 13–17)
LACTATE SERPL-SCNC: 0.9 MMOL/L — SIGNIFICANT CHANGE UP (ref 0.7–2)
MAGNESIUM SERPL-MCNC: 2 MG/DL — SIGNIFICANT CHANGE UP (ref 1.6–2.6)
MCHC RBC-ENTMCNC: 26.9 PG — LOW (ref 27–34)
MCHC RBC-ENTMCNC: 30.8 GM/DL — LOW (ref 32–36)
MCV RBC AUTO: 87.2 FL — SIGNIFICANT CHANGE UP (ref 80–100)
PHOSPHATE SERPL-MCNC: 3.2 MG/DL — SIGNIFICANT CHANGE UP (ref 2.5–4.5)
PLATELET # BLD AUTO: 199 K/UL — SIGNIFICANT CHANGE UP (ref 150–400)
POTASSIUM SERPL-MCNC: 3.9 MMOL/L — SIGNIFICANT CHANGE UP (ref 3.5–5.3)
POTASSIUM SERPL-SCNC: 3.9 MMOL/L — SIGNIFICANT CHANGE UP (ref 3.5–5.3)
RBC # BLD: 2.9 M/UL — LOW (ref 4.2–5.8)
RBC # FLD: 16.7 % — HIGH (ref 10.3–14.5)
SODIUM SERPL-SCNC: 143 MMOL/L — SIGNIFICANT CHANGE UP (ref 135–145)
WBC # BLD: 8.82 K/UL — SIGNIFICANT CHANGE UP (ref 3.8–10.5)
WBC # FLD AUTO: 8.82 K/UL — SIGNIFICANT CHANGE UP (ref 3.8–10.5)

## 2023-08-19 PROCEDURE — 82272 OCCULT BLD FECES 1-3 TESTS: CPT

## 2023-08-19 PROCEDURE — 86900 BLOOD TYPING SEROLOGIC ABO: CPT

## 2023-08-19 PROCEDURE — 36430 TRANSFUSION BLD/BLD COMPNT: CPT

## 2023-08-19 PROCEDURE — 84100 ASSAY OF PHOSPHORUS: CPT

## 2023-08-19 PROCEDURE — 82550 ASSAY OF CK (CPK): CPT

## 2023-08-19 PROCEDURE — 80048 BASIC METABOLIC PNL TOTAL CA: CPT

## 2023-08-19 PROCEDURE — 93005 ELECTROCARDIOGRAM TRACING: CPT

## 2023-08-19 PROCEDURE — 74176 CT ABD & PELVIS W/O CONTRAST: CPT | Mod: 26,MA

## 2023-08-19 PROCEDURE — 85025 COMPLETE CBC W/AUTO DIFF WBC: CPT

## 2023-08-19 PROCEDURE — 86850 RBC ANTIBODY SCREEN: CPT

## 2023-08-19 PROCEDURE — 99223 1ST HOSP IP/OBS HIGH 75: CPT

## 2023-08-19 PROCEDURE — 86901 BLOOD TYPING SEROLOGIC RH(D): CPT

## 2023-08-19 PROCEDURE — 97530 THERAPEUTIC ACTIVITIES: CPT | Mod: GP

## 2023-08-19 PROCEDURE — 85027 COMPLETE CBC AUTOMATED: CPT

## 2023-08-19 PROCEDURE — 97162 PT EVAL MOD COMPLEX 30 MIN: CPT | Mod: GP

## 2023-08-19 PROCEDURE — 71250 CT THORAX DX C-: CPT | Mod: 26,MA

## 2023-08-19 PROCEDURE — 83735 ASSAY OF MAGNESIUM: CPT

## 2023-08-19 PROCEDURE — 36415 COLL VENOUS BLD VENIPUNCTURE: CPT

## 2023-08-19 PROCEDURE — 99233 SBSQ HOSP IP/OBS HIGH 50: CPT

## 2023-08-19 PROCEDURE — 86920 COMPATIBILITY TEST SPIN: CPT

## 2023-08-19 PROCEDURE — 97116 GAIT TRAINING THERAPY: CPT | Mod: GP

## 2023-08-19 PROCEDURE — P9016: CPT

## 2023-08-19 RX ORDER — PIPERACILLIN AND TAZOBACTAM 4; .5 G/20ML; G/20ML
3.38 INJECTION, POWDER, LYOPHILIZED, FOR SOLUTION INTRAVENOUS EVERY 8 HOURS
Refills: 0 | Status: DISCONTINUED | OUTPATIENT
Start: 2023-08-19 | End: 2023-08-25

## 2023-08-19 RX ORDER — ONDANSETRON 8 MG/1
4 TABLET, FILM COATED ORAL EVERY 6 HOURS
Refills: 0 | Status: DISCONTINUED | OUTPATIENT
Start: 2023-08-19 | End: 2023-08-25

## 2023-08-19 RX ORDER — SODIUM CHLORIDE 9 MG/ML
1000 INJECTION INTRAMUSCULAR; INTRAVENOUS; SUBCUTANEOUS
Refills: 0 | Status: DISCONTINUED | OUTPATIENT
Start: 2023-08-19 | End: 2023-08-20

## 2023-08-19 RX ORDER — POLYETHYLENE GLYCOL 3350 17 G/17G
17 POWDER, FOR SOLUTION ORAL DAILY
Refills: 0 | Status: DISCONTINUED | OUTPATIENT
Start: 2023-08-19 | End: 2023-08-25

## 2023-08-19 RX ORDER — KETOROLAC TROMETHAMINE 30 MG/ML
15 SYRINGE (ML) INJECTION ONCE
Refills: 0 | Status: DISCONTINUED | OUTPATIENT
Start: 2023-08-19 | End: 2023-08-21

## 2023-08-19 RX ORDER — ACETAMINOPHEN 500 MG
1000 TABLET ORAL ONCE
Refills: 0 | Status: COMPLETED | OUTPATIENT
Start: 2023-08-19 | End: 2023-08-19

## 2023-08-19 RX ORDER — ACETAMINOPHEN 500 MG
1000 TABLET ORAL ONCE
Refills: 0 | Status: DISCONTINUED | OUTPATIENT
Start: 2023-08-19 | End: 2023-08-25

## 2023-08-19 RX ORDER — TAMSULOSIN HYDROCHLORIDE 0.4 MG/1
0.4 CAPSULE ORAL AT BEDTIME
Refills: 0 | Status: DISCONTINUED | OUTPATIENT
Start: 2023-08-19 | End: 2023-08-25

## 2023-08-19 RX ORDER — MIRTAZAPINE 45 MG/1
7.5 TABLET, ORALLY DISINTEGRATING ORAL AT BEDTIME
Refills: 0 | Status: DISCONTINUED | OUTPATIENT
Start: 2023-08-19 | End: 2023-08-25

## 2023-08-19 RX ORDER — SENNA PLUS 8.6 MG/1
2 TABLET ORAL AT BEDTIME
Refills: 0 | Status: DISCONTINUED | OUTPATIENT
Start: 2023-08-19 | End: 2023-08-25

## 2023-08-19 RX ORDER — SODIUM CHLORIDE 9 MG/ML
1000 INJECTION, SOLUTION INTRAVENOUS
Refills: 0 | Status: DISCONTINUED | OUTPATIENT
Start: 2023-08-19 | End: 2023-08-19

## 2023-08-19 RX ADMIN — PIPERACILLIN AND TAZOBACTAM 25 GRAM(S): 4; .5 INJECTION, POWDER, LYOPHILIZED, FOR SOLUTION INTRAVENOUS at 05:19

## 2023-08-19 RX ADMIN — Medication 400 MILLIGRAM(S): at 04:39

## 2023-08-19 RX ADMIN — Medication 1000 MILLIGRAM(S): at 04:54

## 2023-08-19 RX ADMIN — PIPERACILLIN AND TAZOBACTAM 25 GRAM(S): 4; .5 INJECTION, POWDER, LYOPHILIZED, FOR SOLUTION INTRAVENOUS at 14:16

## 2023-08-19 RX ADMIN — SODIUM CHLORIDE 150 MILLILITER(S): 9 INJECTION, SOLUTION INTRAVENOUS at 02:31

## 2023-08-19 RX ADMIN — Medication 1000 MILLIGRAM(S): at 17:51

## 2023-08-19 RX ADMIN — Medication 1000 MILLIGRAM(S): at 03:14

## 2023-08-19 RX ADMIN — Medication 400 MILLIGRAM(S): at 16:47

## 2023-08-19 RX ADMIN — PIPERACILLIN AND TAZOBACTAM 25 GRAM(S): 4; .5 INJECTION, POWDER, LYOPHILIZED, FOR SOLUTION INTRAVENOUS at 21:52

## 2023-08-19 RX ADMIN — SODIUM CHLORIDE 150 MILLILITER(S): 9 INJECTION INTRAMUSCULAR; INTRAVENOUS; SUBCUTANEOUS at 21:52

## 2023-08-19 RX ADMIN — SODIUM CHLORIDE 150 MILLILITER(S): 9 INJECTION INTRAMUSCULAR; INTRAVENOUS; SUBCUTANEOUS at 04:41

## 2023-08-19 NOTE — DIETITIAN INITIAL EVALUATION ADULT - ADD RECOMMEND
1) Suggest SLP consult to determine appropriate diet consistency  2) Will provide Ensure + HP shake BID to optimize nutritional needs (provides 350 kcal, 20g protein/ shake)   3) Monitor bowel movements, if no BM for >3 days, consider implementing stronger bowel regimen.   4) MVI w/ minerals daily to ensure 100% RDA met  5) Consider adding thiamine 100 mg daily 2/2 suspected poor PO intake/ malnutrition   6) Monitor daily lytes/min and replete prn   7) Monitor daily wt to track/trend changes; strict I/Os   8) Confirm goals of care regarding nutrition support. Nutrition support is not recommended due to overall declining medical status which evidenced based studies indicate EN is not effective in prolonging survival and improving quality of life. It can also increase risk of aspiration pneumonia as well as other related issues.   RD will continue to monitor PO intake/ tolerance, labs, hydration, and wt prn.

## 2023-08-19 NOTE — H&P ADULT - NSICDXPASTSURGICALHX_GEN_ALL_CORE_FT
PAST SURGICAL HISTORY:  No significant past surgical history      PAST SURGICAL HISTORY:  H/O cervical spine surgery

## 2023-08-19 NOTE — DIETITIAN INITIAL EVALUATION ADULT - PERTINENT LABORATORY DATA
08-19    143  |  117<H>  |  36<H>  ----------------------------<  101<H>  3.9   |  21<L>  |  2.51<H>    Ca    7.9<L>      19 Aug 2023 05:41  Phos  3.2     08-19  Mg     2.0     08-19    TPro  7.7  /  Alb  2.9<L>  /  TBili  0.5  /  DBili  x   /  AST  28  /  ALT  13  /  AlkPhos  101  08-18

## 2023-08-19 NOTE — H&P ADULT - HISTORY OF PRESENT ILLNESS
79 y/o M with a PMHx of dementia, BPH, depression, and anemia presents to the ED with C-collar s/p fall. the patient is from [place] and was being ambulated to the bathroom when he fell to the floor and struck his head. No LOC. as per aid.  Pt non verbal, combative. Removing line and identifications.    Aid not present at bedside not able to obtain further history.      Vital Signs Last 24 Hrs  T(C): 36.8 (19 Aug 2023 01:52), Max: 38.4 (18 Aug 2023 20:53)  T(F): 98.3 (19 Aug 2023 01:52), Max: 101.1 (18 Aug 2023 20:53)  HR: 75 (19 Aug 2023 00:50) (75 - 103)  BP: 92/55 (19 Aug 2023 00:50) (92/55 - 124/57)  BP(mean): 66 (19 Aug 2023 00:50) (66 - 66)  RR: 15 (19 Aug 2023 00:50) (15 - 18)  SpO2: 100% (19 Aug 2023 00:50) (97% - 100%)    Parameters below as of 19 Aug 2023 00:50  Patient On (Oxygen Delivery Method): room air    Meds:  acetaminophen   IVPB .. 1000 milliGRAM(s) IV Intermittent once PRN  ketorolac   Injectable 15 milliGRAM(s) IV Push once PRN  lactated ringers. 1000 milliLiter(s) IV Continuous <Continuous>  mirtazapine 7.5 milliGRAM(s) Oral at bedtime  ondansetron Injectable 4 milliGRAM(s) IV Push every 6 hours PRN  piperacillin/tazobactam IVPB.. 3.375 Gram(s) IV Intermittent every 8 hours  polyethylene glycol 3350 17 Gram(s) Oral daily  senna 2 Tablet(s) Oral at bedtime  tamsulosin 0.4 milliGRAM(s) Oral at bedtime

## 2023-08-19 NOTE — DIETITIAN INITIAL EVALUATION ADULT - FACTORS AFF FOOD INTAKE
change in mental status/difficulty with food procurement/preparation/pain/persistent lack of appetite

## 2023-08-19 NOTE — H&P ADULT - NSHPLABSRESULTS_GEN_ALL_CORE
Labs:             9.1    11.63 )-----------( 229      ( 18 Aug 2023 21:54 )             29.6     CBC Full  -  ( 18 Aug 2023 21:54 )  WBC Count : 11.63 K/uL  RBC Count : 3.39 M/uL  Hemoglobin : 9.1 g/dL  Hematocrit : 29.6 %  Platelet Count - Automated : 229 K/uL  Mean Cell Volume : 87.3 fl  Mean Cell Hemoglobin : 26.8 pg  Mean Cell Hemoglobin Concentration : 30.7 gm/dL  Auto Neutrophil # : 9.70 K/uL  Auto Lymphocyte # : 0.65 K/uL  Auto Monocyte # : 1.19 K/uL  Auto Eosinophil # : 0.00 K/uL  Auto Basophil # : 0.04 K/uL  Auto Neutrophil % : 83.5 %  Auto Lymphocyte % : 5.6 %  Auto Monocyte % : 10.2 %  Auto Eosinophil % : 0.0 %  Auto Basophil % : 0.3 %    08-18    144  |  114<H>  |  37<H>  ----------------------------<  123<H>  4.7   |  23  |  2.72<H>    Ca    8.7      18 Aug 2023 21:54    TPro  7.7  /  Alb  2.9<L>  /  TBili  0.5  /  DBili  x   /  AST  28  /  ALT  13  /  AlkPhos  101  08-18    LIVER FUNCTIONS - ( 18 Aug 2023 21:54 )  Alb: 2.9 g/dL / Pro: 7.7 gm/dL / ALK PHOS: 101 U/L / ALT: 13 U/L / AST: 28 U/L / GGT: x           PT/INR - ( 18 Aug 2023 21:54 )   PT: 13.7 sec;   INR: 1.22 ratio         PTT - ( 18 Aug 2023 21:54 )  PTT:30.4 sec      Radiology:  < from: CT Head No Cont (08.18.23 @ 21:18) >      IMPRESSION:  1. No acute intracranial CT abnormality.  2. Intra-articular lucency in the right C2 lateral mass favored to   represent residual nonunion fracture over recurrent fracture.  3. Mild loosening of the right C2 pedicle screw, and progression of   loosening of the suboccipital plate.    < end of copied text >    < from: CT Abdomen and Pelvis No Cont (08.19.23 @ 00:41) >    MPRESSION:  No acute findings in the chest, abdomen, or pelvis.        --- End of Report ---

## 2023-08-19 NOTE — PROGRESS NOTE ADULT - ASSESSMENT
Reversal of the cervical lordosis, new compared to the prior. Stigmata of   prior fracture at the base of the odontoid process. Residual partial   nonunion fracture versus recurrent fracture involving the right C2   lateral mass, seen on axialseries 605, images 33-35. Craniocervical   alignment is maintained. Redemonstrated surgical fusion from the   posterior occiput to C4. Metallic hardware is intact. 4 mm posterior   separation of the left aspect of the suboccipital plate is new compared   to the prior. Stable posterior separation of the right aspect of the   suboccipital plate. New lucency surrounds the right C2 pedicle screw. No   acute compression fracture or prevertebral edema.    No gross upper cervical canal hematoma or mass.Cervical soft tissues are   unremarkable.    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

## 2023-08-19 NOTE — H&P ADULT - ATTENDING COMMENTS
A/P:  C2 non union, chronic  Anemia  UTI on iv antibiotics  Loose pedical screws from prior cervical surgery  Spine surgery consult and mgmt for above cervical pathology  Dementia  SICU for serial neurochecks  GI/DVT prophylaxis  Pain control  F/U labs  ICU for critical care mgmt  Pt will be monitored for signs of evolution/resolution of pathology and surgical intervention as required and warranted    55 minuted of time spent on pt examination, review of relevant labs and radiologic studies, assured stabilization of pt, discussion with relevant services/providers for coordination of pt care and services

## 2023-08-19 NOTE — H&P ADULT - ASSESSMENT
77 yo M with severe dementia, non verbal s/p fall at nursing home was found to have a C2 non union fracture and a UTI.   Lactic acidosis in the ED, resolved after bolus.    P:  - Admit to Dr Oilvas to SICU  - Q2 neuro checks  - Pain control as needed  - iv hydration  - iv abx  - tertiary in Am

## 2023-08-19 NOTE — DIETITIAN INITIAL EVALUATION ADULT - OTHER INFO
77 y/o M with a PMHx of dementia, BPH, depression, and anemia presents to the ED with C-collar s/p fall. the patient is from [place] and was being ambulated to the bathroom when he fell to the floor and struck his head. Pt non verbal, combative. Removing line and identifications. Aid not present at bedside not able to obtain further history. Unknown code status.     Pt known to nutr services, met criteria for PCM on previous admission (6/3/23). Unable to obtain info during RD assessment as pt non-verbal and combative as per RN. Has C-collar for neck fracture, trauma team following. Was NPO however now cleared for PO diet - on pureed foods however unknown if on pureed at NH as nothing is noted in chart; would suggest SLP consult to determine appropriate diet consistency. EMR wt doc'd at 128# (mild edema may be skewing wt appearance). Wt hx: 160# on 6/3/23; 32# wt loss/ 20.0% - severe and clin sig wt loss. Appeared very thin and frail w/ visual NFPE revealing moderate to severe muscle/fat wasting. Will add Ensure + HP shake BID to optimize nutritional needs (provides 350 kcal, 20g protein/ shake). See recommendations below.

## 2023-08-19 NOTE — DIETITIAN INITIAL EVALUATION ADULT - PHYSICAL ASSESSMENT SHOULDERS
Goal Outcome Evaluation:           Progress: no change  Outcome Summary: Patient slept most of the day and refused medications. Finally more awake and alert this evening to eat dinner and take medicines. No complaints from patient other than being uncooperative and refusing care with staff.   severe

## 2023-08-19 NOTE — PROGRESS NOTE ADULT - ASSESSMENT
79 y/o male from [facility] dementia, BPH, urinary retention with chronic Lorenzo cath, presented after a fall     Found to have suspected C2 fracture with mild loosening of prior hardware screw   Also has cath induced UTI with sepsis (POA), prerenal ALEKSANDR and acute metabolic encephalopathy       Plan:     Neurochecks per spine sx   Maintain cervical collar   Avoid AC    Continue Remeron     Pain mx   IS if able     BP is stable     Resp stable  Aspiration precautions     Empiric zosyn   Fever resolved   WBC improved  F/u BCx, UCx   Lorenzo changed in ED     IVF  Renal fn slowly improving  Avoid nephrotoxic meds     Anemia likely dilutional   No external bleeding   No acute injury on CT c/a/p    DVT ppx with SCDs       Keep in SICU

## 2023-08-19 NOTE — ED ADULT NURSE NOTE - OBJECTIVE STATEMENT
Patient presents to ED s/p witnessed fall from a nursing facility. Staff members were helping patient in the bathroom, pt fell onto head, has laceration to top-right of scalp. No LOC. PMHx of dementia, at baseline mental status. Patient presents to ED s/p witnessed fall from a nursing facility. Staff members at [place] were helping patient in the bathroom, pt fell onto head, has laceration to top-right of scalp. No LOC. PMHx of dementia, at baseline mental status. Patient presents to ED s/p witnessed fall from a nursing facility. Staff members at [place] were helping patient in the bathroom, pt fell onto head, has laceration to top-right of scalp. No LOC. PMHx of dementia, at baseline mental status. Pt came with a Lorenzo in place.

## 2023-08-19 NOTE — PATIENT PROFILE ADULT - FUNCTIONAL ASSESSMENT - BASIC MOBILITY ASSESSMENT TYPE
EMERGENCY DEPARTMENT HISTORY AND PHYSICAL EXAM      Date: 12/4/2019  Patient Name: Pam Rosas    History of Presenting Illness     HPI: Pam Rosas is a 57 y.o. female with past medical history of morbid obesity, diabetes, hepatic steatosis presents to the emergency room for left-sided pleuritic chest pain that started 2 to 3 days ago. Patient reports she has been under a lot of stress recently because she was let off from her job. She says that she is unsure of what she was doing when her pain started and says that it is currently a 8 out of 10, pain that she is unable to describe but says that it does not feel like a pressure, nonradiating, progressively worsening, radiates to the left side of her neck, nonexertional.  Patient reports that she has not had any nausea vomiting, diaphoresis, syncope, peripheral edema, orthopnea, hemoptysis, fever, recent travel, history of DVT/PE, among other associated symptoms and history. Pertinent social history: None    Pertinent surgical history: None    PCP: Kinza Acuña MD    Current Outpatient Medications   Medication Sig Dispense Refill    aspirin 81 mg chewable tablet Take 1 Tab by mouth daily for 30 days. 30 Tab 0    HYDROcodone-acetaminophen (NORCO) 5-325 mg per tablet Take 1 Tab by mouth every eight (8) hours as needed for Pain for up to 3 days. Max Daily Amount: 3 Tabs. 10 Tab 0    lisinopril-hydroCHLOROthiazide (PRINZIDE, ZESTORETIC) 10-12.5 mg per tablet TAKE 1 TABLET BY MOUTH ONCE DAILY FOR BLOOD PRESSURE 90 Tab 3    metFORMIN ER (GLUCOPHAGE XR) 500 mg tablet TAKE 2 TABLETS BY MOUTH TWICE DAILY 120 Tab 5    liraglutide (VICTOZA 2-RENEE) 0.6 mg/0.1 mL (18 mg/3 mL) pnij 1.8 mg by SubCUTAneous route daily. 9 Pen 3    fluticasone propion-salmeterol (ADVAIR DISKUS) 500-50 mcg/dose diskus inhaler Take 1 Puff by inhalation every twelve (12) hours.  Asthma, Dx: J45.30 60 Each 11    metaxalone (SKELAXIN) 800 mg tablet Take 1 Tab by mouth three (3) times daily as needed (muscle spasms). 90 Tab 0    atorvastatin (LIPITOR) 10 mg tablet TAKE 1 TABLET BY MOUTH ONCE DAILY 90 Tab 3    glipiZIDE (GLUCOTROL) 10 mg tablet TAKE 1 TABLET BY MOUTH TWICE DAILY 180 Tab 3    pregabalin (LYRICA) 200 mg capsule Take 200 mg by mouth two (2) times a day.  montelukast (SINGULAIR) 10 mg tablet TAKE ONE TABLET BY MOUTH ONCE DAILY FOR  ALLERGIES  AND  ASTHMA 90 Tab 3    amitriptyline (ELAVIL) 50 mg tablet Take 1 Tab by mouth nightly. For numbness at hands and feet. 30 Tab 2    nitroglycerin (NITROSTAT) 0.4 mg SL tablet Take 1 Tab by mouth every five (5) minutes as needed for Chest Pain. Sit/Lay down then put one tab under the tongue every 5 minutes as needed for chest pain for 3 doses 1 Bottle 0    albuterol (PROVENTIL VENTOLIN) 2.5 mg /3 mL (0.083 %) nebulizer solution USE ONE VIAL IN NEBULIZER EVERY 4 HOURS AS NEEDED FOR WHEEZING 30 Each 3    albuterol (VENTOLIN HFA) 90 mcg/actuation inhaler Take 2 Puffs by inhalation every six (6) hours as needed for Wheezing.  2 Inhaler 5       Past History     Past Medical History:  Past Medical History:   Diagnosis Date    Anemia (iron deficiency)     Asthma     DDD (degenerative disc disease), lumbar     Depression     DM type 2 (diabetes mellitus, type 2) ([facility] [address] 2011    GERD (gastroesophageal reflux disease)     GI bleed     Hepatic steatosis 2016    confirmed by US    HTN (hypertension)     Irregular menses     Irritable bowel     Kidney mass     19: US showed rigth renal cystic nephroma, follows with Urology (Dr. Kimber Ngo)    Morbid obesity ([facility] [address]     FE (obstructive sleep apnea)     w/ Cpap    PUD (peptic ulcer disease)        Past Surgical History:  Past Surgical History:   Procedure Laterality Date    HX  SECTION      x 2    HX COLONOSCOPY  2015    HX ENDOSCOPY  2015    HX HYSTEROSCOPY WITH ENDOMETRIAL ABLATION  2014    HX TUBAL LIGATION      HX TUMOR REMOVAL 2/2016    right kidney, Dr Daisy Charles removed from right kidney on 02/15/2016        Family History:  Family History   Problem Relation Age of Onset    Cancer Mother         Ovarian Cancer /breast ca    Heart Attack Father     Heart Disease Father     Diabetes Father     Other Father         pancreatitis    Cancer Sister     Other Sister         chrons    Heart Attack Maternal Grandfather     Diabetes Paternal Grandmother     HIV/AIDS Son        Social History:  Social History     Tobacco Use    Smoking status: Never Smoker    Smokeless tobacco: Never Used   Substance Use Topics    Alcohol use: No    Drug use: No       Allergies: Allergies   Allergen Reactions    Pcn [Penicillins] Hives         Review of Systems   Review of Systems   Constitutional: Negative for chills and fever. HENT: Negative for congestion and ear pain. Respiratory: Negative for shortness of breath. Cardiovascular: Positive for chest pain. Negative for palpitations and leg swelling. Gastrointestinal: Negative for abdominal pain, diarrhea, nausea and vomiting. Musculoskeletal: Positive for neck pain. Negative for back pain. Skin: Negative for rash. Neurological: Negative for light-headedness and headaches. Psychiatric/Behavioral: Positive for dysphoric mood. Negative for agitation, sleep disturbance and suicidal ideas. The patient is nervous/anxious. Physical Exam     Vitals:    12/04/19 1841   BP: 117/43   Pulse: 70   Resp: 20   Temp: 98 °F (36.7 °C)   SpO2: 100%   Weight: 124.1 kg (273 lb 9.5 oz)   Height: 5' 5\" (1.651 m)     Physical Exam  Vitals signs and nursing note reviewed. Constitutional:       General: She is not in acute distress. Appearance: She is well-developed. She is not diaphoretic. HENT:      Head: Normocephalic and atraumatic. Cardiovascular:      Rate and Rhythm: Normal rate and regular rhythm. Pulses: Normal pulses.       Heart sounds: Normal heart sounds. No murmur. No gallop. Pulmonary:      Effort: Pulmonary effort is normal.      Breath sounds: Normal breath sounds. Musculoskeletal:      Right lower leg: No edema. Left lower leg: No edema. Comments: Mild tenderness to palpation of left anterior lower ribs   Skin:     General: Skin is warm and dry. Capillary Refill: Capillary refill takes less than 2 seconds. Neurological:      Mental Status: She is alert and oriented to person, place, and time. Psychiatric:         Behavior: Behavior normal.         Thought Content: Thought content normal.         Judgment: Judgment normal.           Diagnostic Study Results     Labs -     Recent Results (from the past 12 hour(s))   CBC WITH AUTOMATED DIFF    Collection Time: 12/04/19  7:29 PM   Result Value Ref Range    WBC 7.1 3.6 - 11.0 K/uL    RBC 4.24 3.80 - 5.20 M/uL    HGB 11.6 11.5 - 16.0 g/dL    HCT 35.9 35.0 - 47.0 %    MCV 84.7 80.0 - 99.0 FL    MCH 27.4 26.0 - 34.0 PG    MCHC 32.3 30.0 - 36.5 g/dL    RDW 15.1 (H) 11.5 - 14.5 %    PLATELET 130 098 - 217 K/uL    MPV 11.2 8.9 - 12.9 FL    NRBC 0.0 0  WBC    ABSOLUTE NRBC 0.00 0.00 - 0.01 K/uL    NEUTROPHILS 54 32 - 75 %    LYMPHOCYTES 37 12 - 49 %    MONOCYTES 6 5 - 13 %    EOSINOPHILS 3 0 - 7 %    BASOPHILS 0 0 - 1 %    IMMATURE GRANULOCYTES 0 0.0 - 0.5 %    ABS. NEUTROPHILS 3.8 1.8 - 8.0 K/UL    ABS. LYMPHOCYTES 2.6 0.8 - 3.5 K/UL    ABS. MONOCYTES 0.4 0.0 - 1.0 K/UL    ABS. EOSINOPHILS 0.2 0.0 - 0.4 K/UL    ABS. BASOPHILS 0.0 0.0 - 0.1 K/UL    ABS. IMM.  GRANS. 0.0 0.00 - 0.04 K/UL    DF AUTOMATED     METABOLIC PANEL, COMPREHENSIVE    Collection Time: 12/04/19  7:29 PM   Result Value Ref Range    Sodium 138 136 - 145 mmol/L    Potassium 3.7 3.5 - 5.1 mmol/L    Chloride 104 97 - 108 mmol/L    CO2 27 21 - 32 mmol/L    Anion gap 7 5 - 15 mmol/L    Glucose 87 65 - 100 mg/dL    BUN 9 6 - 20 MG/DL    Creatinine 1.03 (H) 0.55 - 1.02 MG/DL    BUN/Creatinine ratio 9 (L) 12 - 20      GFR est AA >60 >60 ml/min/1.73m2    GFR est non-AA 58 (L) >60 ml/min/1.73m2    Calcium 9.6 8.5 - 10.1 MG/DL    Bilirubin, total 0.2 0.2 - 1.0 MG/DL    ALT (SGPT) 25 12 - 78 U/L    AST (SGOT) 10 (L) 15 - 37 U/L    Alk. phosphatase 77 45 - 117 U/L    Protein, total 7.3 6.4 - 8.2 g/dL    Albumin 3.2 (L) 3.5 - 5.0 g/dL    Globulin 4.1 (H) 2.0 - 4.0 g/dL    A-G Ratio 0.8 (L) 1.1 - 2.2     TROPONIN I    Collection Time: 12/04/19  7:29 PM   Result Value Ref Range    Troponin-I, Qt. <0.05 <0.05 ng/mL       Radiologic Studies -   XR CHEST PA LAT   Final Result   IMPRESSION: No acute cardiopulmonary disease. CT Results  (Last 48 hours)    None                Medical Decision Making   I am the first provider for this patient. I reviewed the vital signs, available nursing notes, past medical history, past surgical history, social history, and previous EKG. ED Course and Progress notes:   Initial assessment performed. The patients presenting problems have been discussed, and they are in agreement with the care plan formulated and outlined with them. I have encouraged them to ask questions as they arise throughout their visit. I spoke with Dr. Barr Olustee about Mrs. Jared Edwards history, physical assessment, lab work-up, imaging, heart score, and we are both in agreement that the patient is safe to follow-up with cardiology for an outpatient stress test.      On  re evaluation pt is resting comfortably, and has no new complaints, changes, or physical findings. The patient has improved and is stable. Procedures:  Procedures    Critical Care Time: none    Vital Signs-Reviewed the patient's vital signs.   Vitals:    12/04/19 1841   BP: 117/43   BP 1 Location: Left arm   BP Patient Position: At rest   Pulse: 70   Resp: 20   Temp: 98 °F (36.7 °C)   SpO2: 100%   Weight: 124.1 kg (273 lb 9.5 oz)   Height: 5' 5\" (1.651 m)       Medications Administered During ED Course  Medications   oxyCODONE-acetaminophen (PERCOCET) 5-325 mg per tablet 1 Tab (1 Tab Oral Given 12/4/19 2000)     Disposition:  D/c home    DISCHARGE NOTE:   The patient was counseled on diagnosis and care plan. All available lab and imaging results have been reviewed and were discussed with the patient, including all incidental findings. The likelihood of other entities in the differential is insufficient to justify any further testing for them. This was explained to the patient. Patient agrees with plan and agrees to follow up with cardiology as recommended, or return to the ED immediately if their symptoms worsen. All medications were reviewed with the patient. All of pt's questions and concerns were addressed. The patient was advised that new or worsening symptoms would require further evaluation and should prompt immediate return to the Emergency Department. Discharge instructions have been provided and explained to the patient, along with reasons to return to the ED. Patient voices understanding and is agreeable with the plan for discharge. Patient is ready to go home. Follow-up Information     Follow up With Specialties Details Why Contact Info    Kinza Acuña MD Internal Medicine Schedule an appointment as soon as possible for a visit As needed [address] [place] [address]  [phone]      [facility] EMERGENCY DEPT Emergency Medicine Go to If symptoms worsen [address] [facility]    Zahra Murphy MD Cardiology, [address] [facility] Vascular Surgery, Internal Medicine Schedule an appointment as soon as possible for a visit for chest pain [address]             Discharge Medication List as of 12/4/2019  8:20 PM      START taking these medications    Details   aspirin 81 mg chewable tablet Take 1 Tab by mouth daily for 30 days. , Print, Disp-30 Tab, R-0      HYDROcodone-acetaminophen (NORCO) 5-325 mg per tablet Take 1 Tab by mouth every eight (8) hours as needed for Pain for up to 3 days. Max Daily Amount: 3 Tabs., Print, Disp-10 Tab, R-0         CONTINUE these medications which have NOT CHANGED    Details   lisinopril-hydroCHLOROthiazide (PRINZIDE, ZESTORETIC) 10-12.5 mg per tablet TAKE 1 TABLET BY MOUTH ONCE DAILY FOR BLOOD PRESSURE, Normal, Disp-90 Tab, R-3      metFORMIN ER (GLUCOPHAGE XR) 500 mg tablet TAKE 2 TABLETS BY MOUTH TWICE DAILY, NormalPlease consider 90 day supplies to promote better adherenceDisp-120 Tab, R-5      liraglutide (VICTOZA 2-RENEE) 0.6 mg/0.1 mL (18 mg/3 mL) pnij 1.8 mg by SubCUTAneous route daily. , Normal, Disp-9 Pen, R-3      fluticasone propion-salmeterol (ADVAIR DISKUS) 500-50 mcg/dose diskus inhaler Take 1 Puff by inhalation every twelve (12) hours. Asthma, Dx: J45.30, Normal, Disp-60 Each, R-11      metaxalone (SKELAXIN) 800 mg tablet Take 1 Tab by mouth three (3) times daily as needed (muscle spasms). , Normal, Disp-90 Tab, R-0      atorvastatin (LIPITOR) 10 mg tablet TAKE 1 TABLET BY MOUTH ONCE DAILY, Normal, Disp-90 Tab, R-3      glipiZIDE (GLUCOTROL) 10 mg tablet TAKE 1 TABLET BY MOUTH TWICE DAILY, NormalPlease consider 90 day supplies to promote better adherenceDisp-180 Tab, R-3      pregabalin (LYRICA) 200 mg capsule Take 200 mg by mouth two (2) times a day., Historical Med      montelukast (SINGULAIR) 10 mg tablet TAKE ONE TABLET BY MOUTH ONCE DAILY FOR  ALLERGIES  AND  ASTHMA, NormalPlease consider 90 day supplies to promote better adherenceDisp-90 Tab, R-3      amitriptyline (ELAVIL) 50 mg tablet Take 1 Tab by mouth nightly. For numbness at hands and feet., Normal, Disp-30 Tab, R-2      nitroglycerin (NITROSTAT) 0.4 mg SL tablet Take 1 Tab by mouth every five (5) minutes as needed for Chest Pain.  Sit/Lay down then put one tab under the tongue every 5 minutes as needed for chest pain for 3 doses, Print, Disp-1 Bottle, R-0      albuterol (PROVENTIL VENTOLIN) 2.5 mg /3 mL (0.083 %) nebulizer solution USE ONE VIAL IN NEBULIZER EVERY 4 HOURS AS NEEDED FOR WHEEZING, NormalPlease consider 90 day supplies to promote better adherenceDisp-30 Each, R-3      albuterol (VENTOLIN HFA) 90 mcg/actuation inhaler Take 2 Puffs by inhalation every six (6) hours as needed for Wheezing., Normal, Disp-2 Inhaler, R-5         STOP taking these medications       diclofenac EC (VOLTAREN) 75 mg EC tablet Comments:   Reason for Stopping:               Provider Notes (Medical Decision Making):   Differential diagnosis: stable angina, musculoskeletal pain, valvular heart disease, GERD, PUD, herpes zoster, pleuritis, functional GI pain, costochondritis, anxiety, low concern forACS, aortic dissection, PE, pneumothorax        Diagnosis     Clinical Impression:   1. Chest pain, unspecified type        Please note that this dictation was completed with [email or web address], the computer voice recognition software. Quite often unanticipated grammatical, syntax, homophones, and other interpretive errors are inadvertently transcribed by the computer software. Please disregard these errors. Please excuse any errors that have escaped final proofreading. This note will not be viewable in [address] Admission

## 2023-08-19 NOTE — DIETITIAN INITIAL EVALUATION ADULT - ORAL INTAKE PTA/DIET HISTORY
Unable to obtain diet/wt hx as pt is not forthcoming with information upon RD visit. Per chart - from Cassidy REECE.

## 2023-08-19 NOTE — H&P ADULT - NSHPPHYSICALEXAM_GEN_ALL_CORE
Physical exam:  Airway is patent  Breathing is symmetric and unlabored  Psych: non verbal  HEENT: Normocephalic, 1 cm lesion top of the head with dry blood, REJI, EOM wnl, no otorrhea or hemotympanum b/l, no epistaxis or d/c b/l nares, no craniofacial bony pathology or tenderness b/l  Neck: Pt in hard cervical collar at time of exam. No crepitus, no ecchymosis, no hematoma, to exam, no JVD, no tracheal deviation  Cervicothoracolumbosacral spine: no gross bony pathology or tenderness to exam  Chest: no gross rib pathology or tenderness to exam. No penetrating thoracoabdominal trauma  Respiratory:  Respiratory Effort normal;  ABD: Pt combative no letting examiner do exam  Musculoskeletal: Pt has palpable b/l radial, femoral, dorsalis pedis pulses. All digits are warm and well perfused. No gross long bone pathology or tenderness to exam.   Skin: no lesions or rashes to exam Physical exam:  Airway is patent  Breathing is symmetric and unlabored  Psych: non verbal baseline dementia  HEENT: Normocephalic, 1 cm lesion top of the head with dry blood, REJI, EOM wnl, no otorrhea or hemotympanum b/l, no epistaxis or d/c b/l nares, no craniofacial bony pathology or tenderness b/l  Neck: Pt in hard cervical collar at time of exam. No crepitus, no ecchymosis, no hematoma, to exam, no JVD, no tracheal deviation  Cervicothoracolumbosacral spine: no gross bony pathology or tenderness to exam  Chest: no gross rib pathology or tenderness to exam. No penetrating thoracoabdominal trauma  Respiratory:  Respiratory Effort normal; CTAB  CVS: s1s2+  ABD: Pt combative and does not allow for full exam, however no gross distention or tenderness grossly appreciated  Musculoskeletal: Pt has palpable b/l radial, femoral, dorsalis pedis pulses. All digits are warm and well perfused. No gross long bone pathology or tenderness to exam.   Skin: no lesions or rashes to exam otherwise

## 2023-08-19 NOTE — DIETITIAN INITIAL EVALUATION ADULT - PERTINENT MEDS FT
MEDICATIONS  (STANDING):  mirtazapine 7.5 milliGRAM(s) Oral at bedtime  piperacillin/tazobactam IVPB.. 3.375 Gram(s) IV Intermittent every 8 hours  polyethylene glycol 3350 17 Gram(s) Oral daily  senna 2 Tablet(s) Oral at bedtime  sodium chloride 0.9%. 1000 milliLiter(s) (150 mL/Hr) IV Continuous <Continuous>  tamsulosin 0.4 milliGRAM(s) Oral at bedtime    MEDICATIONS  (PRN):  acetaminophen   IVPB .. 1000 milliGRAM(s) IV Intermittent once PRN Temp greater or equal to 38C (100.4F), Mild Pain (1 - 3)  ketorolac   Injectable 15 milliGRAM(s) IV Push once PRN Moderate Pain (4 - 6)  ondansetron Injectable 4 milliGRAM(s) IV Push every 6 hours PRN Nausea

## 2023-08-20 DIAGNOSIS — Z98.890 OTHER SPECIFIED POSTPROCEDURAL STATES: Chronic | ICD-10-CM

## 2023-08-20 LAB
ADD ON TEST-SPECIMEN IN LAB: SIGNIFICANT CHANGE UP
ANION GAP SERPL CALC-SCNC: 8 MMOL/L — SIGNIFICANT CHANGE UP (ref 5–17)
BUN SERPL-MCNC: 35 MG/DL — HIGH (ref 7–23)
CALCIUM SERPL-MCNC: 8.1 MG/DL — LOW (ref 8.5–10.1)
CHLORIDE SERPL-SCNC: 119 MMOL/L — HIGH (ref 96–108)
CK SERPL-CCNC: 740 U/L — HIGH (ref 26–308)
CO2 SERPL-SCNC: 20 MMOL/L — LOW (ref 22–31)
CREAT SERPL-MCNC: 2.82 MG/DL — HIGH (ref 0.5–1.3)
CULTURE RESULTS: SIGNIFICANT CHANGE UP
EGFR: 22 ML/MIN/1.73M2 — LOW
GLUCOSE SERPL-MCNC: 84 MG/DL — SIGNIFICANT CHANGE UP (ref 70–99)
HCT VFR BLD CALC: 26.4 % — LOW (ref 39–50)
HGB BLD-MCNC: 8 G/DL — LOW (ref 13–17)
MAGNESIUM SERPL-MCNC: 2.2 MG/DL — SIGNIFICANT CHANGE UP (ref 1.6–2.6)
MCHC RBC-ENTMCNC: 26.6 PG — LOW (ref 27–34)
MCHC RBC-ENTMCNC: 30.3 GM/DL — LOW (ref 32–36)
MCV RBC AUTO: 87.7 FL — SIGNIFICANT CHANGE UP (ref 80–100)
PHOSPHATE SERPL-MCNC: 3.8 MG/DL — SIGNIFICANT CHANGE UP (ref 2.5–4.5)
PLATELET # BLD AUTO: 203 K/UL — SIGNIFICANT CHANGE UP (ref 150–400)
POTASSIUM SERPL-MCNC: 4.2 MMOL/L — SIGNIFICANT CHANGE UP (ref 3.5–5.3)
POTASSIUM SERPL-SCNC: 4.2 MMOL/L — SIGNIFICANT CHANGE UP (ref 3.5–5.3)
RBC # BLD: 3.01 M/UL — LOW (ref 4.2–5.8)
RBC # FLD: 17 % — HIGH (ref 10.3–14.5)
SODIUM SERPL-SCNC: 147 MMOL/L — HIGH (ref 135–145)
SPECIMEN SOURCE: SIGNIFICANT CHANGE UP
WBC # BLD: 9.37 K/UL — SIGNIFICANT CHANGE UP (ref 3.8–10.5)
WBC # FLD AUTO: 9.37 K/UL — SIGNIFICANT CHANGE UP (ref 3.8–10.5)

## 2023-08-20 PROCEDURE — 99233 SBSQ HOSP IP/OBS HIGH 50: CPT

## 2023-08-20 PROCEDURE — 99232 SBSQ HOSP IP/OBS MODERATE 35: CPT

## 2023-08-20 RX ORDER — ACETAMINOPHEN 500 MG
1000 TABLET ORAL ONCE
Refills: 0 | Status: COMPLETED | OUTPATIENT
Start: 2023-08-20 | End: 2023-08-20

## 2023-08-20 RX ORDER — SODIUM CHLORIDE 9 MG/ML
1000 INJECTION, SOLUTION INTRAVENOUS
Refills: 0 | Status: DISCONTINUED | OUTPATIENT
Start: 2023-08-20 | End: 2023-08-25

## 2023-08-20 RX ADMIN — Medication 400 MILLIGRAM(S): at 18:23

## 2023-08-20 RX ADMIN — MIRTAZAPINE 7.5 MILLIGRAM(S): 45 TABLET, ORALLY DISINTEGRATING ORAL at 22:51

## 2023-08-20 RX ADMIN — TAMSULOSIN HYDROCHLORIDE 0.4 MILLIGRAM(S): 0.4 CAPSULE ORAL at 22:52

## 2023-08-20 RX ADMIN — SODIUM CHLORIDE 150 MILLILITER(S): 9 INJECTION INTRAMUSCULAR; INTRAVENOUS; SUBCUTANEOUS at 06:22

## 2023-08-20 RX ADMIN — PIPERACILLIN AND TAZOBACTAM 25 GRAM(S): 4; .5 INJECTION, POWDER, LYOPHILIZED, FOR SOLUTION INTRAVENOUS at 14:04

## 2023-08-20 RX ADMIN — SODIUM CHLORIDE 75 MILLILITER(S): 9 INJECTION, SOLUTION INTRAVENOUS at 12:15

## 2023-08-20 RX ADMIN — PIPERACILLIN AND TAZOBACTAM 25 GRAM(S): 4; .5 INJECTION, POWDER, LYOPHILIZED, FOR SOLUTION INTRAVENOUS at 06:21

## 2023-08-20 RX ADMIN — PIPERACILLIN AND TAZOBACTAM 25 GRAM(S): 4; .5 INJECTION, POWDER, LYOPHILIZED, FOR SOLUTION INTRAVENOUS at 22:50

## 2023-08-20 RX ADMIN — SENNA PLUS 2 TABLET(S): 8.6 TABLET ORAL at 22:51

## 2023-08-20 NOTE — PHYSICAL THERAPY INITIAL EVALUATION ADULT - MODALITIES TREATMENT COMMENTS
Pt resisting therapist throughout. PROM to B LEs performed. Pt yelling at staff, tell them to leave him alone. Rolled for positioning. Left supine in bed at this time.

## 2023-08-20 NOTE — PHYSICAL THERAPY INITIAL EVALUATION ADULT - PERTINENT HX OF CURRENT PROBLEM, REHAB EVAL
Pt admitted to  secondary to s/p fall at SNF. (+) suspected C2 fx with loosening of hardware. As per ortho, no ortho spine intervention, no C-collar needed. H/o dementia. Pt very confused, yelling at staff, agitated.

## 2023-08-20 NOTE — PROGRESS NOTE ADULT - ASSESSMENT
77 y/o male from [facility] dementia, BPH, urinary retention with chronic Lorenzo cath, presented after a fall     Found to have suspected C2 fracture with mild loosening of prior hardware screw   Also has cath induced UTI with sepsis (POA), prerenal ALEKSANDR and acute metabolic encephalopathy       Plan:     Collar cleared by spine sx     Continue Remeron     Med list obtained from [facility] - patient is not on any dopaminergic/serotonergic agents there, extremity stiffness is intermittent, will monitor for now     Pain mx   IS if able     BP is stable     Intermittent bradycardia ~30s, will get EKG with next episode, no obvious AVB on telemonitoring, avoid vero blockers    Resp stable  Aspiration precautions     Empiric zosyn   Fever resolved   WBC improved  BCx neg so far, UCx pending   Lorenzo changed in ED     Cr slightly increased, will continue IVF, change to D5 0.45 NaCl     Anemia stable   No external bleeding   No acute injury on CT c/a/p    DVT ppx with SCDs       Prognosis poor overall, patient is wheelchair bound and has not communicated appropriately per brother for years, bedside RN discussed GOC - brother wants all measures to sustain his life

## 2023-08-20 NOTE — PROGRESS NOTE ADULT - ASSESSMENT
A/P:  C2 non union, chronic  Anemia  UTI on iv antibiotics  Loose pedical screws from prior cervical surgery  Spine surgery on consult and mgmt for above cervical pathology, cleared from spine surgical standpoint, no cervical collar needed  Dementia  SICU for serial neurochecks  GI/DVT prophylaxis  Pain control  F/U labs  ICU for critical care mgmt  Cont current care and meds  Pt will be monitored for signs of evolution/resolution of pathology and surgical intervention as required and warranted    35 minuted of time spent on pt examination, review of relevant labs and radiologic studies, assured stabilization of pt, discussion with relevant services/providers for coordination of pt care and services

## 2023-08-20 NOTE — PROGRESS NOTE ADULT - ASSESSMENT
Reversal of the cervical lordosis, new compared to the prior. Stigmata of   prior fracture at the base of the odontoid process. Residual partial   nonunion fracture versus recurrent fracture involving the right C2   lateral mass, seen on axialseries 605, images 33-35. Craniocervical   alignment is maintained. Redemonstrated surgical fusion from the   posterior occiput to C4. Metallic hardware is intact. 4 mm posterior   separation of the left aspect of the suboccipital plate is new compared   to the prior. Stable posterior separation of the right aspect of the   suboccipital plate. New lucency surrounds the right C2 pedicle screw. No   acute compression fracture or prevertebral edema.    No gross upper cervical canal hematoma or mass.Cervical soft tissues are   unremarkable.    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

## 2023-08-21 ENCOUNTER — TRANSCRIPTION ENCOUNTER (OUTPATIENT)
Age: 78
End: 2023-08-21

## 2023-08-21 LAB
ANION GAP SERPL CALC-SCNC: 6 MMOL/L — SIGNIFICANT CHANGE UP (ref 5–17)
BASOPHILS # BLD AUTO: 0.04 K/UL — SIGNIFICANT CHANGE UP (ref 0–0.2)
BASOPHILS NFR BLD AUTO: 0.5 % — SIGNIFICANT CHANGE UP (ref 0–2)
BUN SERPL-MCNC: 35 MG/DL — HIGH (ref 7–23)
CALCIUM SERPL-MCNC: 8 MG/DL — LOW (ref 8.5–10.1)
CHLORIDE SERPL-SCNC: 120 MMOL/L — HIGH (ref 96–108)
CO2 SERPL-SCNC: 19 MMOL/L — LOW (ref 22–31)
CREAT SERPL-MCNC: 2.88 MG/DL — HIGH (ref 0.5–1.3)
EGFR: 22 ML/MIN/1.73M2 — LOW
EOSINOPHIL # BLD AUTO: 0.27 K/UL — SIGNIFICANT CHANGE UP (ref 0–0.5)
EOSINOPHIL NFR BLD AUTO: 3.5 % — SIGNIFICANT CHANGE UP (ref 0–6)
GLUCOSE SERPL-MCNC: 103 MG/DL — HIGH (ref 70–99)
HCT VFR BLD CALC: 28.5 % — LOW (ref 39–50)
HGB BLD-MCNC: 8.7 G/DL — LOW (ref 13–17)
IMM GRANULOCYTES NFR BLD AUTO: 0.4 % — SIGNIFICANT CHANGE UP (ref 0–0.9)
LYMPHOCYTES # BLD AUTO: 0.83 K/UL — LOW (ref 1–3.3)
LYMPHOCYTES # BLD AUTO: 10.7 % — LOW (ref 13–44)
MAGNESIUM SERPL-MCNC: 2.2 MG/DL — SIGNIFICANT CHANGE UP (ref 1.6–2.6)
MCHC RBC-ENTMCNC: 26.2 PG — LOW (ref 27–34)
MCHC RBC-ENTMCNC: 30.5 GM/DL — LOW (ref 32–36)
MCV RBC AUTO: 85.8 FL — SIGNIFICANT CHANGE UP (ref 80–100)
MONOCYTES # BLD AUTO: 0.99 K/UL — HIGH (ref 0–0.9)
MONOCYTES NFR BLD AUTO: 12.8 % — SIGNIFICANT CHANGE UP (ref 2–14)
NEUTROPHILS # BLD AUTO: 5.6 K/UL — SIGNIFICANT CHANGE UP (ref 1.8–7.4)
NEUTROPHILS NFR BLD AUTO: 72.1 % — SIGNIFICANT CHANGE UP (ref 43–77)
PHOSPHATE SERPL-MCNC: 3.4 MG/DL — SIGNIFICANT CHANGE UP (ref 2.5–4.5)
PLATELET # BLD AUTO: 243 K/UL — SIGNIFICANT CHANGE UP (ref 150–400)
POTASSIUM SERPL-MCNC: 3.8 MMOL/L — SIGNIFICANT CHANGE UP (ref 3.5–5.3)
POTASSIUM SERPL-SCNC: 3.8 MMOL/L — SIGNIFICANT CHANGE UP (ref 3.5–5.3)
RBC # BLD: 3.32 M/UL — LOW (ref 4.2–5.8)
RBC # FLD: 17 % — HIGH (ref 10.3–14.5)
SODIUM SERPL-SCNC: 145 MMOL/L — SIGNIFICANT CHANGE UP (ref 135–145)
WBC # BLD: 7.76 K/UL — SIGNIFICANT CHANGE UP (ref 3.8–10.5)
WBC # FLD AUTO: 7.76 K/UL — SIGNIFICANT CHANGE UP (ref 3.8–10.5)

## 2023-08-21 PROCEDURE — 93010 ELECTROCARDIOGRAM REPORT: CPT

## 2023-08-21 PROCEDURE — 99232 SBSQ HOSP IP/OBS MODERATE 35: CPT

## 2023-08-21 PROCEDURE — 99231 SBSQ HOSP IP/OBS SF/LOW 25: CPT

## 2023-08-21 RX ORDER — ACETAMINOPHEN 500 MG
1000 TABLET ORAL ONCE
Refills: 0 | Status: COMPLETED | OUTPATIENT
Start: 2023-08-21 | End: 2023-08-21

## 2023-08-21 RX ORDER — HALOPERIDOL DECANOATE 100 MG/ML
2 INJECTION INTRAMUSCULAR ONCE
Refills: 0 | Status: COMPLETED | OUTPATIENT
Start: 2023-08-21 | End: 2023-08-21

## 2023-08-21 RX ADMIN — HALOPERIDOL DECANOATE 2 MILLIGRAM(S): 100 INJECTION INTRAMUSCULAR at 15:24

## 2023-08-21 RX ADMIN — POLYETHYLENE GLYCOL 3350 17 GRAM(S): 17 POWDER, FOR SOLUTION ORAL at 10:30

## 2023-08-21 RX ADMIN — PIPERACILLIN AND TAZOBACTAM 25 GRAM(S): 4; .5 INJECTION, POWDER, LYOPHILIZED, FOR SOLUTION INTRAVENOUS at 13:15

## 2023-08-21 RX ADMIN — Medication 1000 MILLIGRAM(S): at 00:00

## 2023-08-21 RX ADMIN — Medication 400 MILLIGRAM(S): at 04:37

## 2023-08-21 RX ADMIN — PIPERACILLIN AND TAZOBACTAM 25 GRAM(S): 4; .5 INJECTION, POWDER, LYOPHILIZED, FOR SOLUTION INTRAVENOUS at 22:51

## 2023-08-21 RX ADMIN — SODIUM CHLORIDE 75 MILLILITER(S): 9 INJECTION, SOLUTION INTRAVENOUS at 13:17

## 2023-08-21 RX ADMIN — SENNA PLUS 2 TABLET(S): 8.6 TABLET ORAL at 22:24

## 2023-08-21 RX ADMIN — TAMSULOSIN HYDROCHLORIDE 0.4 MILLIGRAM(S): 0.4 CAPSULE ORAL at 22:25

## 2023-08-21 RX ADMIN — SODIUM CHLORIDE 75 MILLILITER(S): 9 INJECTION, SOLUTION INTRAVENOUS at 22:52

## 2023-08-21 RX ADMIN — HALOPERIDOL DECANOATE 2 MILLIGRAM(S): 100 INJECTION INTRAMUSCULAR at 04:38

## 2023-08-21 RX ADMIN — PIPERACILLIN AND TAZOBACTAM 25 GRAM(S): 4; .5 INJECTION, POWDER, LYOPHILIZED, FOR SOLUTION INTRAVENOUS at 05:27

## 2023-08-21 RX ADMIN — MIRTAZAPINE 7.5 MILLIGRAM(S): 45 TABLET, ORALLY DISINTEGRATING ORAL at 22:25

## 2023-08-21 NOTE — DISCHARGE NOTE NURSING/CASE MANAGEMENT/SOCIAL WORK - NSDCPEFALRISK_GEN_ALL_CORE
For information on Fall & Injury Prevention, visit: [email or web address] OR  [email or web address] OR  https://www.cdc.gov/steadi/patient.html

## 2023-08-21 NOTE — PROGRESS NOTE ADULT - TIME BILLING
Case discussed with nursing, Dr. Guevara, Ortho Resident
Case discussed with nursing, Dr. Guevara, Ortho Resident
.
.see above
.

## 2023-08-21 NOTE — PROGRESS NOTE ADULT - ASSESSMENT
A/P:  79 yo M s/p fall  C2 non union, chronic  Anemia  UTI on iv antibiotics  Loose pedical screws from prior cervical surgery    P:  cont diet  pain control.   Spine surgery on consult and mgmt for above cervical pathology, cleared from spine surgical standpoint, no cervical collar   Dementia  SICU for serial neurochecks  GI/DVT prophylaxis  Pain control  F/U labs  ICU for critical care mgmt  cont home meds  PT [facility]  SW for dispo planning  f/u am labs

## 2023-08-21 NOTE — DISCHARGE NOTE NURSING/CASE MANAGEMENT/SOCIAL WORK - NSDCVIVACCINE_GEN_ALL_CORE_FT
Tdap; 08-Feb-2021 18:55; Luis Armando Mihcael (RN); [employer]; [id number] (Exp. Date: 21-Jul-2022); IntraMuscular; Deltoid Right.; 0.5 milliLiter(s); VIS (VIS Published: 09-May-2013, VIS Presented: 08-Feb-2021);

## 2023-08-21 NOTE — PROGRESS NOTE ADULT - NUTRITIONAL ASSESSMENT
This patient has been assessed with a concern for Malnutrition and has been determined to have a diagnosis/diagnoses of Severe protein-calorie malnutrition and Underweight (BMI < 19).    This patient is being managed with:   Diet Pureed-  Entered: Aug 19 2023 11:26AM  

## 2023-08-21 NOTE — PROGRESS NOTE ADULT - ASSESSMENT
77 y/o male from [facility] dementia, BPH, urinary retention with chronic Lorenzo cath, presented after a fall     Found to have suspected C2 fracture with mild loosening of prior hardware screw   Also has cath induced UTI with sepsis (POA), prerenal ALEKSANDR and acute metabolic encephalopathy       Plan:     Collar cleared by spine sx     Continue Remeron     Med list obtained from [facility] - patient is not on any dopaminergic/serotonergic agents there, extremity stiffness is intermittent, will monitor for now   PAin Control  H & H Stable   HR stable.  No bradycardia  Aspiration precautions   Empiric zosyn   Fever resolved   WBC improved  BCx neg so far, UCx pending   Lorenzo changed in ED     Cr slightly increased, will continue IVF  Transfer to Med Surg    DVT ppx with SCDs       Prognosis poor overall, patient is wheelchair bound and has not communicated appropriately per brother for years, bedside RN discussed GOC - brother wants all measures to sustain his life.  However DNR in the computer

## 2023-08-21 NOTE — PROGRESS NOTE ADULT - CARDIOVASCULAR
regular rate and rhythm/S1 S2 present/bradycardia
regular rate and rhythm/S1 S2 present
regular rate and rhythm/S1 S2 present/bradycardia

## 2023-08-21 NOTE — DISCHARGE NOTE NURSING/CASE MANAGEMENT/SOCIAL WORK - PATIENT PORTAL LINK FT
You can access the FollowMyHealth Patient Portal offered by [facility] by registering at the following website: [email or web address]. By joining [employer]’s FollowMyHealth portal, you will also be able to view your health information using other applications (apps) compatible with our system.

## 2023-08-21 NOTE — PROGRESS NOTE ADULT - GASTROINTESTINAL
soft/nontender/normal active bowel sounds

## 2023-08-21 NOTE — DISCHARGE NOTE NURSING/CASE MANAGEMENT/SOCIAL WORK - SOCIAL WORKER'S NAME
Strep positive, will rx with amox for 10 days. Results discussed with parent in clinic.   Barbie Parker DNP, CPNP-PC  
Morelia Perry LMSW
arm pain R/neck pain

## 2023-08-21 NOTE — PROGRESS NOTE ADULT - NEUROLOGICAL
sensation intact/responds to pain/responds to verbal commands/no spontaneous movement
sensation intact/responds to pain/responds to verbal commands
sensation intact/responds to pain/responds to verbal commands

## 2023-08-22 ENCOUNTER — TRANSCRIPTION ENCOUNTER (OUTPATIENT)
Age: 78
End: 2023-08-22

## 2023-08-22 LAB
ABO RH CONFIRMATION: SIGNIFICANT CHANGE UP
ANION GAP SERPL CALC-SCNC: 8 MMOL/L — SIGNIFICANT CHANGE UP (ref 5–17)
BASOPHILS # BLD AUTO: 0.01 K/UL — SIGNIFICANT CHANGE UP (ref 0–0.2)
BASOPHILS NFR BLD AUTO: 0.2 % — SIGNIFICANT CHANGE UP (ref 0–2)
BUN SERPL-MCNC: 26 MG/DL — HIGH (ref 7–23)
CALCIUM SERPL-MCNC: 8 MG/DL — LOW (ref 8.5–10.1)
CHLORIDE SERPL-SCNC: 119 MMOL/L — HIGH (ref 96–108)
CO2 SERPL-SCNC: 20 MMOL/L — LOW (ref 22–31)
CREAT SERPL-MCNC: 2.85 MG/DL — HIGH (ref 0.5–1.3)
EGFR: 22 ML/MIN/1.73M2 — LOW
EOSINOPHIL # BLD AUTO: 0.26 K/UL — SIGNIFICANT CHANGE UP (ref 0–0.5)
EOSINOPHIL NFR BLD AUTO: 5.1 % — SIGNIFICANT CHANGE UP (ref 0–6)
GLUCOSE SERPL-MCNC: 107 MG/DL — HIGH (ref 70–99)
HCT VFR BLD CALC: 23.4 % — LOW (ref 39–50)
HCT VFR BLD CALC: 24 % — LOW (ref 39–50)
HCT VFR BLD CALC: 27.9 % — LOW (ref 39–50)
HGB BLD-MCNC: 7.2 G/DL — LOW (ref 13–17)
HGB BLD-MCNC: 7.3 G/DL — LOW (ref 13–17)
HGB BLD-MCNC: 8.7 G/DL — LOW (ref 13–17)
IMM GRANULOCYTES NFR BLD AUTO: 0.4 % — SIGNIFICANT CHANGE UP (ref 0–0.9)
LYMPHOCYTES # BLD AUTO: 1.16 K/UL — SIGNIFICANT CHANGE UP (ref 1–3.3)
LYMPHOCYTES # BLD AUTO: 22.6 % — SIGNIFICANT CHANGE UP (ref 13–44)
MAGNESIUM SERPL-MCNC: 2 MG/DL — SIGNIFICANT CHANGE UP (ref 1.6–2.6)
MCHC RBC-ENTMCNC: 26.1 PG — LOW (ref 27–34)
MCHC RBC-ENTMCNC: 26.3 PG — LOW (ref 27–34)
MCHC RBC-ENTMCNC: 26.6 PG — LOW (ref 27–34)
MCHC RBC-ENTMCNC: 30.4 GM/DL — LOW (ref 32–36)
MCHC RBC-ENTMCNC: 30.8 GM/DL — LOW (ref 32–36)
MCHC RBC-ENTMCNC: 31.2 GM/DL — LOW (ref 32–36)
MCV RBC AUTO: 85.3 FL — SIGNIFICANT CHANGE UP (ref 80–100)
MCV RBC AUTO: 85.4 FL — SIGNIFICANT CHANGE UP (ref 80–100)
MCV RBC AUTO: 85.7 FL — SIGNIFICANT CHANGE UP (ref 80–100)
MONOCYTES # BLD AUTO: 0.71 K/UL — SIGNIFICANT CHANGE UP (ref 0–0.9)
MONOCYTES NFR BLD AUTO: 13.8 % — SIGNIFICANT CHANGE UP (ref 2–14)
NEUTROPHILS # BLD AUTO: 2.97 K/UL — SIGNIFICANT CHANGE UP (ref 1.8–7.4)
NEUTROPHILS NFR BLD AUTO: 57.9 % — SIGNIFICANT CHANGE UP (ref 43–77)
OB PNL STL: NEGATIVE — SIGNIFICANT CHANGE UP
PHOSPHATE SERPL-MCNC: 3.2 MG/DL — SIGNIFICANT CHANGE UP (ref 2.5–4.5)
PLATELET # BLD AUTO: 191 K/UL — SIGNIFICANT CHANGE UP (ref 150–400)
PLATELET # BLD AUTO: 197 K/UL — SIGNIFICANT CHANGE UP (ref 150–400)
PLATELET # BLD AUTO: 259 K/UL — SIGNIFICANT CHANGE UP (ref 150–400)
POTASSIUM SERPL-MCNC: 3.8 MMOL/L — SIGNIFICANT CHANGE UP (ref 3.5–5.3)
POTASSIUM SERPL-SCNC: 3.8 MMOL/L — SIGNIFICANT CHANGE UP (ref 3.5–5.3)
RBC # BLD: 2.74 M/UL — LOW (ref 4.2–5.8)
RBC # BLD: 2.8 M/UL — LOW (ref 4.2–5.8)
RBC # BLD: 3.27 M/UL — LOW (ref 4.2–5.8)
RBC # FLD: 16.9 % — HIGH (ref 10.3–14.5)
RBC # FLD: 17 % — HIGH (ref 10.3–14.5)
RBC # FLD: 17.2 % — HIGH (ref 10.3–14.5)
SODIUM SERPL-SCNC: 147 MMOL/L — HIGH (ref 135–145)
WBC # BLD: 4.97 K/UL — SIGNIFICANT CHANGE UP (ref 3.8–10.5)
WBC # BLD: 5.13 K/UL — SIGNIFICANT CHANGE UP (ref 3.8–10.5)
WBC # BLD: 6.15 K/UL — SIGNIFICANT CHANGE UP (ref 3.8–10.5)
WBC # FLD AUTO: 4.97 K/UL — SIGNIFICANT CHANGE UP (ref 3.8–10.5)
WBC # FLD AUTO: 5.13 K/UL — SIGNIFICANT CHANGE UP (ref 3.8–10.5)
WBC # FLD AUTO: 6.15 K/UL — SIGNIFICANT CHANGE UP (ref 3.8–10.5)

## 2023-08-22 RX ADMIN — SENNA PLUS 2 TABLET(S): 8.6 TABLET ORAL at 21:30

## 2023-08-22 RX ADMIN — TAMSULOSIN HYDROCHLORIDE 0.4 MILLIGRAM(S): 0.4 CAPSULE ORAL at 21:31

## 2023-08-22 RX ADMIN — PIPERACILLIN AND TAZOBACTAM 25 GRAM(S): 4; .5 INJECTION, POWDER, LYOPHILIZED, FOR SOLUTION INTRAVENOUS at 13:22

## 2023-08-22 RX ADMIN — PIPERACILLIN AND TAZOBACTAM 25 GRAM(S): 4; .5 INJECTION, POWDER, LYOPHILIZED, FOR SOLUTION INTRAVENOUS at 21:30

## 2023-08-22 RX ADMIN — POLYETHYLENE GLYCOL 3350 17 GRAM(S): 17 POWDER, FOR SOLUTION ORAL at 11:35

## 2023-08-22 RX ADMIN — SODIUM CHLORIDE 75 MILLILITER(S): 9 INJECTION, SOLUTION INTRAVENOUS at 18:17

## 2023-08-22 RX ADMIN — PIPERACILLIN AND TAZOBACTAM 25 GRAM(S): 4; .5 INJECTION, POWDER, LYOPHILIZED, FOR SOLUTION INTRAVENOUS at 05:59

## 2023-08-22 RX ADMIN — MIRTAZAPINE 7.5 MILLIGRAM(S): 45 TABLET, ORALLY DISINTEGRATING ORAL at 21:31

## 2023-08-22 NOTE — DISCHARGE NOTE PROVIDER - NSDCMRMEDTOKEN_GEN_ALL_CORE_FT
cyanocobalamin 1000 mcg oral tablet: 1 tab(s) orally once a day  docusate sodium 100 mg oral capsule: 1 cap(s) orally 2 times a day  ferrous sulfate 325 mg (65 mg elemental iron) oral tablet: 1 tab(s) orally 2 times a day  lactulose 10 g/15 mL oral syrup: 30 milliliter(s) orally every 12 hours (hold if BM within 24 hours)  mirtazapine 7.5 mg oral tablet: 1 tab(s) orally once a day (at bedtime)  Multiple Vitamins with Minerals oral tablet: 1 tab(s) orally once a day  polyethylene glycol 3350 oral powder for reconstitution: 17 gram(s) orally once a day  senna (sennosides) 8.6 mg oral tablet: 2 tab(s) orally once a day (at bedtime)  tamsulosin 0.4 mg oral capsule: 1 cap(s) orally once a day   cyanocobalamin 1000 mcg oral tablet: 1 tab(s) orally once a day  docusate sodium 100 mg oral capsule: 1 cap(s) orally 2 times a day  ferrous sulfate 325 mg (65 mg elemental iron) oral tablet: 1 tab(s) orally 2 times a day  lactulose 10 g/15 mL oral syrup: 30 milliliter(s) orally every 12 hours (hold if BM within 24 hours)  mirtazapine 7.5 mg oral tablet: 1 tab(s) orally once a day (at bedtime)  Multiple Vitamins with Minerals oral tablet: 1 tab(s) orally once a day  polyethylene glycol 3350 oral powder for reconstitution: 17 gram(s) orally once a day  potassium chloride 20 mEq oral tablet, extended release: 1 tab(s) orally once a day  senna (sennosides) 8.6 mg oral tablet: 2 tab(s) orally once a day (at bedtime)  tamsulosin 0.4 mg oral capsule: 1 cap(s) orally once a day

## 2023-08-22 NOTE — DISCHARGE NOTE PROVIDER - HOSPITAL COURSE
79 y/o M with a PMHx of dementia, BPH, depression, and anemia presents to the ED with C-collar s/p fall. the patient is from [place] and was being ambulated to the bathroom when he fell to the floor and struck his head. No LOC as per aid. Patient had a pan scan which showed a C2 non union fracture and labs showed he had a UTI. He was treated for UTi with IV antibiotics and ortho was consulted for the C2 fracture who recommended no intervention and no need for collar. Patient has advanced dementia and has been bound during his hospital stay requiring assistance with ADLs. He also recieved 1 unit PRBC for a drop in Hemoglobin.

## 2023-08-22 NOTE — DISCHARGE NOTE PROVIDER - NSDCCPCAREPLAN_GEN_ALL_CORE_FT
PRINCIPAL DISCHARGE DIAGNOSIS  Diagnosis: Cervical spine fracture  Assessment and Plan of Treatment:       SECONDARY DISCHARGE DIAGNOSES  Diagnosis: Acute UTI  Assessment and Plan of Treatment:     Diagnosis: Sepsis due to urinary tract infection  Assessment and Plan of Treatment:

## 2023-08-22 NOTE — DISCHARGE NOTE PROVIDER - DETAILS OF MALNUTRITION DIAGNOSIS/DIAGNOSES
This patient has been assessed with a concern for Malnutrition and was treated during this hospitalization for the following Nutrition diagnosis/diagnoses:     -  08/19/2023: Severe protein-calorie malnutrition   -  08/19/2023: Underweight (BMI < 19)

## 2023-08-22 NOTE — PROGRESS NOTE ADULT - ASSESSMENT
A/P:  79 yo M s/p fall  C2 non union, chronic  Anemia  UTI on iv antibiotics  Loose pedical screws from prior cervical surgery  doing well    P:  cont diet  pain control.   Spine surgery on consult and mgmt for above cervical pathology, cleared from spine surgical standpoint, no cervical collar   Dementia  GI/DVT prophylaxis  Pain control  F/U labs  cont home meds  PT consult [facility] vs SNF  SW for dispo planning  f/u am labs  DC today

## 2023-08-23 LAB
ANION GAP SERPL CALC-SCNC: 5 MMOL/L — SIGNIFICANT CHANGE UP (ref 5–17)
BASOPHILS # BLD AUTO: 0.01 K/UL — SIGNIFICANT CHANGE UP (ref 0–0.2)
BASOPHILS NFR BLD AUTO: 0.2 % — SIGNIFICANT CHANGE UP (ref 0–2)
BUN SERPL-MCNC: 25 MG/DL — HIGH (ref 7–23)
CALCIUM SERPL-MCNC: 7.9 MG/DL — LOW (ref 8.5–10.1)
CHLORIDE SERPL-SCNC: 119 MMOL/L — HIGH (ref 96–108)
CO2 SERPL-SCNC: 19 MMOL/L — LOW (ref 22–31)
CREAT SERPL-MCNC: 2.69 MG/DL — HIGH (ref 0.5–1.3)
EGFR: 24 ML/MIN/1.73M2 — LOW
EOSINOPHIL # BLD AUTO: 0.6 K/UL — HIGH (ref 0–0.5)
EOSINOPHIL NFR BLD AUTO: 11.6 % — HIGH (ref 0–6)
GLUCOSE SERPL-MCNC: 106 MG/DL — HIGH (ref 70–99)
HCT VFR BLD CALC: 27.7 % — LOW (ref 39–50)
HCT VFR BLD CALC: 27.8 % — LOW (ref 39–50)
HGB BLD-MCNC: 8.5 G/DL — LOW (ref 13–17)
HGB BLD-MCNC: 8.8 G/DL — LOW (ref 13–17)
IMM GRANULOCYTES NFR BLD AUTO: 0.4 % — SIGNIFICANT CHANGE UP (ref 0–0.9)
LYMPHOCYTES # BLD AUTO: 1.04 K/UL — SIGNIFICANT CHANGE UP (ref 1–3.3)
LYMPHOCYTES # BLD AUTO: 20.1 % — SIGNIFICANT CHANGE UP (ref 13–44)
MAGNESIUM SERPL-MCNC: 2 MG/DL — SIGNIFICANT CHANGE UP (ref 1.6–2.6)
MCHC RBC-ENTMCNC: 26.5 PG — LOW (ref 27–34)
MCHC RBC-ENTMCNC: 27.3 PG — SIGNIFICANT CHANGE UP (ref 27–34)
MCHC RBC-ENTMCNC: 30.6 GM/DL — LOW (ref 32–36)
MCHC RBC-ENTMCNC: 31.8 GM/DL — LOW (ref 32–36)
MCV RBC AUTO: 86 FL — SIGNIFICANT CHANGE UP (ref 80–100)
MCV RBC AUTO: 86.6 FL — SIGNIFICANT CHANGE UP (ref 80–100)
MONOCYTES # BLD AUTO: 0.68 K/UL — SIGNIFICANT CHANGE UP (ref 0–0.9)
MONOCYTES NFR BLD AUTO: 13.2 % — SIGNIFICANT CHANGE UP (ref 2–14)
NEUTROPHILS # BLD AUTO: 2.82 K/UL — SIGNIFICANT CHANGE UP (ref 1.8–7.4)
NEUTROPHILS NFR BLD AUTO: 54.5 % — SIGNIFICANT CHANGE UP (ref 43–77)
PHOSPHATE SERPL-MCNC: 3 MG/DL — SIGNIFICANT CHANGE UP (ref 2.5–4.5)
PLATELET # BLD AUTO: 216 K/UL — SIGNIFICANT CHANGE UP (ref 150–400)
PLATELET # BLD AUTO: 250 K/UL — SIGNIFICANT CHANGE UP (ref 150–400)
POTASSIUM SERPL-MCNC: 4 MMOL/L — SIGNIFICANT CHANGE UP (ref 3.5–5.3)
POTASSIUM SERPL-SCNC: 4 MMOL/L — SIGNIFICANT CHANGE UP (ref 3.5–5.3)
RBC # BLD: 3.21 M/UL — LOW (ref 4.2–5.8)
RBC # BLD: 3.22 M/UL — LOW (ref 4.2–5.8)
RBC # FLD: 17.1 % — HIGH (ref 10.3–14.5)
RBC # FLD: 17.1 % — HIGH (ref 10.3–14.5)
SODIUM SERPL-SCNC: 143 MMOL/L — SIGNIFICANT CHANGE UP (ref 135–145)
WBC # BLD: 5.17 K/UL — SIGNIFICANT CHANGE UP (ref 3.8–10.5)
WBC # BLD: 5.75 K/UL — SIGNIFICANT CHANGE UP (ref 3.8–10.5)
WBC # FLD AUTO: 5.17 K/UL — SIGNIFICANT CHANGE UP (ref 3.8–10.5)
WBC # FLD AUTO: 5.75 K/UL — SIGNIFICANT CHANGE UP (ref 3.8–10.5)

## 2023-08-23 PROCEDURE — 99231 SBSQ HOSP IP/OBS SF/LOW 25: CPT

## 2023-08-23 RX ORDER — PANTOPRAZOLE SODIUM 20 MG/1
40 TABLET, DELAYED RELEASE ORAL
Refills: 0 | Status: DISCONTINUED | OUTPATIENT
Start: 2023-08-23 | End: 2023-08-25

## 2023-08-23 RX ADMIN — TAMSULOSIN HYDROCHLORIDE 0.4 MILLIGRAM(S): 0.4 CAPSULE ORAL at 22:09

## 2023-08-23 RX ADMIN — PIPERACILLIN AND TAZOBACTAM 25 GRAM(S): 4; .5 INJECTION, POWDER, LYOPHILIZED, FOR SOLUTION INTRAVENOUS at 12:46

## 2023-08-23 RX ADMIN — PIPERACILLIN AND TAZOBACTAM 25 GRAM(S): 4; .5 INJECTION, POWDER, LYOPHILIZED, FOR SOLUTION INTRAVENOUS at 22:06

## 2023-08-23 RX ADMIN — SENNA PLUS 2 TABLET(S): 8.6 TABLET ORAL at 22:09

## 2023-08-23 RX ADMIN — MIRTAZAPINE 7.5 MILLIGRAM(S): 45 TABLET, ORALLY DISINTEGRATING ORAL at 22:13

## 2023-08-23 RX ADMIN — PIPERACILLIN AND TAZOBACTAM 25 GRAM(S): 4; .5 INJECTION, POWDER, LYOPHILIZED, FOR SOLUTION INTRAVENOUS at 05:23

## 2023-08-23 RX ADMIN — POLYETHYLENE GLYCOL 3350 17 GRAM(S): 17 POWDER, FOR SOLUTION ORAL at 11:53

## 2023-08-23 NOTE — CDI QUERY NOTE - NSCDIOTHERTXTBX_GEN_ALL_CORE_HH
Clinical documentation indicates that this patient has a decreased Hemoglobin and Hematocrit blood levels with a documented history of anemia  Could  you please further clarify a clinical diagnosis associated with the hematology findings, GI consult and treatment    -Acute blood loss anemia  -Chronic blood loss anemia  -Other anemia please specify  -Clinically not significative    Supporting Documentation and/or Clinical Evidence:    -Hemoglobin: 8.5 g/dL (08.23.23 @ 11:26) Hemoglobin: 8.8 g/dL (08.23.23 @ 07:28) Hemoglobin: 8.7 g/dL (08.22.23 @ 22:00) Hemoglobin: 7.2 g/dL (08.22.23 @ 10:17) Hemoglobin: 7.3 g/dL (08.22.23 @ 07:45) Hemoglobin: 8.7 g/dL (08.21.23 @ 05:38) Hemoglobin: 8.0 g/dL (08.20.23 @ 05:23) Hemoglobin: 7.8 g/dL (08.19.23 @ 05:41) Hemoglobin: 9.1 g/dL (08.18.23 @ 21:54)     -Hematocrit: 27.8 % (08.23.23 @ 11:26) Hematocrit: 27.7 % (08.23.23 @ 07:28) Hematocrit: 27.9 % (08.22.23 @ 22:00) Hematocrit: 23.4 % (08.22.23 @ 10:17) Hematocrit: 24.0 % (08.22.23 @ 07:45)   Hematocrit: 28.5 % (08.21.23 @ 05:38) Hematocrit: 26.4 % (08.20.23 @ 05:23) Hematocrit: 25.3 % (08.19.23 @ 05:41) Hematocrit: 29.6 % (08.18.23 @ 21:54)     -Treatment -PRBC on 8/22-    -GI -PN  8/23-unclear h/o melena start PPI daily monitor CBC if recurrent episodes will need EGD.

## 2023-08-23 NOTE — CHART NOTE - NSCHARTNOTEFT_GEN_A_CORE
GI  pt seen this AM-full note to follow    unclear h/o melena    start PPI daily  monitor CBC  if recurrent episodes will need EGD

## 2023-08-23 NOTE — PROGRESS NOTE ADULT - ASSESSMENT
78 with apparent CKD III B with baseline near 1.5 mg/dl dementia, BPH, depression, and anemia presents to the ED with C-collar s/p fall. the patient is from [place] and was being ambulated to the bathroom when he fell to the floor and struck his head per documents. Patient was seen by surgical team and in ICU. Renal for ALEKSANDR on CKD    ALEKSANDR on CKD III  -Renal function stable, downtrending  -Keep net positive, w hypotonic IVF  -No nsaids/contrast  -If intake at goal, stop ivf    Anemia  -Per medical/surgical teams    dc with RN staff

## 2023-08-23 NOTE — CONSULT NOTE ADULT - CONSULT REASON
ALEKSANDR on CKD
GIB
Left Suboccipital Plate Loosening, Right C2 Pedicle Screw Loosening, and Suspicion for Dens Nonunion versus Recurrent Fracture Demonstrated on CT Cervical Spine Study  Time Called: ~ 0000  Time Seen: ~ 0015
S/P Fall

## 2023-08-23 NOTE — PROGRESS NOTE ADULT - ATTENDING COMMENTS
MElena?  H/H stable after transfusion  repeat CBC AM DC if stable
agree w above  worsening creatinine  c/w hydration a, avoid nephrotoxic drugs  will get nephrology

## 2023-08-23 NOTE — PROGRESS NOTE ADULT - ASSESSMENT
A/P:  77 yo M s/p fall  C2 non union, chronic  Anemia  UTI on iv antibiotics  Loose pedical screws from prior cervical surgery  doing well  h/h stable post 1 uPRBC    P:  cont diet  pain control.   Spine surgery on consult and mgmt for above cervical pathology, cleared from spine surgical standpoint, no cervical collar   Dementia  GI prophylaxis  Pain control  F/U labs  cont home meds  PT consult [facility] for dispo planning  f/u am labs  DC today if GI cleared

## 2023-08-23 NOTE — CONSULT NOTE ADULT - SUBJECTIVE AND OBJECTIVE BOX
Consult Note: Orthopaedic Surgery - Spine Service    Patient is a 78M with an unknown baseline ambulatory status (suspected home-ambulator with assistive device and/or assistance, but unconfirmed) who presents to the [facility] ED status-post witnessed fall at his nursing home ([facility]) earlier today. Per chart review and ED documentation, patient was being ambulated ot the bathroom when he fell and struck his head without an accompanying loss of consciousness. Patient bears an underlying diagnosis of dementia (A&Ox0) and as such exhibits minimal capacity to participate in a question-directed interview or prompt-based physical examination (patient becomes combative upon provider's attempt to initiate the latter).  Patient unable to confirm or deny any new-onset pain, weakness, numbness, or tingling in the bilateral upper or lower extremities; similarly, patient unable to participate in review of systems. Of note, patient sustained a cervical spine fracture in 2021 treated with an Occiput-C4 Posterior Spinal Fusion (Dr. Dove, '21). Orthopaedic Spine Service contacted for patient evaluation today after CT Cervical Spine demonstrated an "intra-articular lucency in the Right C2 lateral mass favored to represent residual nonunion fracture over recurrent fracture... Mild loosening of the right C2 pedicle screw... and progression of loosening of the suboccipital plate." Sepsis Criteria met in ED and patient admitted to Internal Medicine for further evaluation and management.     PAST MEDICAL & SURGICAL HISTORY:  Anemia  Depression  BPH (Benign Prostatic Hyperplasia)  Dementia    ALLERGIES:  No Known Allergies      VITAL SIGNS:  T(C): 38.4 (08-18-23 @ 20:53), Max: 38.4 (08-18-23 @ 20:53)  HR: 75 (08-19-23 @ 00:50) (75 - 103)  BP: 92/55 (08-19-23 @ 00:50) (92/55 - 124/57)  RR: 15 (08-19-23 @ 00:50) (15 - 18)  SpO2: 100% (08-19-23 @ 00:50) (97% - 100%)      PHYSICAL EXAM:    PHYSICAL EXAM  GEN: NAD, AAOx3    SPINE:  Skin intact.   Intermittent and inconsistent noxious response elicited with direct palpation over the midline cervical and thoracic spine, but confounded by patient agitation and combativeness.   Non-TTP over the midline lumbar spine.   Non-TTP over the paraspinal musculature of the cervical, thoracic, or lumbar spine.   No bony step-offs.   Grossly moving all extremities.   SILT throughout all extremities.   + Radial pulses bilaterally.   + DP/PT pulses bilaterally.       Unable to conduct ERICH-based scored spinal examination in the setting of underlying mental status and patient combativeness. However, as above, patient able to demonstrate gross motor function in all four extremities and responds to (via withdrawal from or agitated repsonse to) noxious stimuli in all four extremities.     Unable to accurately elicit bilateral Jean, Myoclonus, or Babinski signs in the setting of the above.     SECONDARY SURVEY:  RLE/LLE/RUE/LUE: No TTP over bony prominences, SILT, palpable pulses, full/painless range of motion, compartments soft      IMAGING:  < from: CT Head No Cont (08.18.23 @ 21:18) >  CT cervical spine:  Reversal of the cervical lordosis, new compared to the prior. Stigmata of prior fracture at the base of the odontoid process. 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 Craniocervical alignment is maintained. Re-demonstrated surgical fusion from the posterior occiput to C4. Metallic hardware is intact. 4 mm posterior separation of the left aspect of the suboccipital plate is new compared to the prior. Stable posterior separation of the right aspect of the suboccipital plate. New lucency surrounds the right C2 pedicle screw. No acute compression fracture or prevertebral edema. No gross upper cervical canal hematoma or mass. Cervical soft tissues are unremarkable.      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       PLAN:  - Bedrest.   - Cervical collar at all times.   - NPO except medications.   - IVF while NPO.   - Hold AC.   - Pain control.   - Will collect collateral information form patient Health Care Proxy / Next of Kin / [facility].   - No acute orthopaedic surgical intervention indicated at this time; however, will re-evaluate based on discussion with Attending Spine Surgeon.   - Medical Management appreciated.   - Will discuss with Dr. Trotter and advise if plan changes.        
Patient is a 78y old  Male who presents with a chief complaint of Fall (19 Aug 2023 01:50)      BRIEF HOSPITAL COURSE: Pt is a 77 y/o M pmhx of dementia, BPH, depression and anemia who presents into  ED w. complaints of s/p fall. Pt was in C collar prior to admission due to a fall w/ + head strike but no LOC. In ED pt CTH showed no intracranial abnormalities, but revealed intra-articular lucency in R C2 lateral mass favored to represent residual nonunion fx over recurrent fx and mild loosening of R C2 pedicle screw w/ loosening of suboccipital plate.        PAST MEDICAL & SURGICAL HISTORY:  Anemia      Depression      BPH (benign prostatic hyperplasia)      Dementia      No significant past surgical history          Review of Systems:  Unable to assess secondary to mentation.       Medications:  piperacillin/tazobactam IVPB.. 3.375 Gram(s) IV Intermittent every 8 hours        acetaminophen   IVPB .. 1000 milliGRAM(s) IV Intermittent once PRN  ketorolac   Injectable 15 milliGRAM(s) IV Push once PRN  mirtazapine 7.5 milliGRAM(s) Oral at bedtime  ondansetron Injectable 4 milliGRAM(s) IV Push every 6 hours PRN        polyethylene glycol 3350 17 Gram(s) Oral daily  senna 2 Tablet(s) Oral at bedtime    tamsulosin 0.4 milliGRAM(s) Oral at bedtime      lactated ringers. 1000 milliLiter(s) IV Continuous <Continuous>                ICU Vital Signs Last 24 Hrs  T(C): 36.8 (19 Aug 2023 01:52), Max: 38.4 (18 Aug 2023 20:53)  T(F): 98.3 (19 Aug 2023 01:52), Max: 101.1 (18 Aug 2023 20:53)  HR: 75 (19 Aug 2023 00:50) (75 - 103)  BP: 92/55 (19 Aug 2023 00:50) (92/55 - 124/57)  BP(mean): 66 (19 Aug 2023 00:50) (66 - 66)  ABP: --  ABP(mean): --  RR: 15 (19 Aug 2023 00:50) (15 - 18)  SpO2: 100% (19 Aug 2023 00:50) (97% - 100%)    O2 Parameters below as of 19 Aug 2023 00:50  Patient On (Oxygen Delivery Method): room air                I&O's Detail        LABS:                        9.1    11.63 )-----------( 229      ( 18 Aug 2023 21:54 )             29.6         144  |  114<H>  |  37<H>  ----------------------------<  123<H>  4.7   |  23  |  2.72<H>    Ca    8.7      18 Aug 2023 21:54    TPro  7.7  /  Alb  2.9<L>  /  TBili  0.5  /  DBili  x   /  AST  28  /  ALT  13  /  AlkPhos  101            CAPILLARY BLOOD GLUCOSE        PT/INR - ( 18 Aug 2023 21:54 )   PT: 13.7 sec;   INR: 1.22 ratio         PTT - ( 18 Aug 2023 21:54 )  PTT:30.4 sec  Urinalysis Basic - ( 18 Aug 2023 21:54 )    Color: Yellow / Appearance: Slightly Turbid / S.015 / pH: x  Gluc: 123 mg/dL / Ketone: Negative  / Bili: Negative / Urobili: Negative   Blood: x / Protein: 100 / Nitrite: Negative   Leuk Esterase: Moderate / RBC: 3-5 /HPF / WBC >50 /HPF   Sq Epi: x / Non Sq Epi: x / Bacteria: Moderate      CULTURES:  Rapid RVP Result: NotDetec (23 @ 21:54)      Physical Examination:    General: Pt laying in hospital bed in mild distress.  Confused, agitated, combative interactive.     HEENT: Pupils equal, reactive to light.  Symmetric.    PULM: Clear to auscultation bilaterally, no significant sputum production    CVS: Regular rate and rhythm, no murmurs, rubs, or gallops    ABD: Soft, nondistended, nontender, normoactive bowel sounds, no masses    EXT: No edema, nontender    SKIN: Warm and well perfused      RADIOLOGY: < from: CT Abdomen and Pelvis No Cont (23 @ 00:41) >  ACC: [id number] EXAM:  CT ABDOMEN AND PELVIS   ORDERED BY: ELIZABETH DODD     ACC: [id number] EXAM:  CT CHEST   ORDERED BY: ELIZABETH DODD     PROCEDURE DATE:  2023          INTERPRETATION:  CLINICAL INFORMATION: Fall with chest and abdominal pain    COMPARISON: Chest CT of 2/8/21  .  CONTRAST/COMPLICATIONS:  IV Contrast: NONE  Oral Contrast: NONE  Complications: None reported at time of study completion    PROCEDURE:  CT of the Chest, Abdomen and Pelvis was performed.  Sagittal and coronal reformats were performed.    FINDINGS:  CHEST:  LUNGS AND LARGE AIRWAYS: Patent central airways. Bilateral streaky   subsegmental atelectasis.    PLEURA: No pleural effusion.  VESSELS: Atherosclerotic calcification of the thoracic aorta and coronary   arteries.  HEART: Heart size is normal. No pericardial effusion.  MEDIASTINUM AND BIJAL: No lymphadenopathy.  CHEST WALL AND LOWER NECK: Within normal limits.    ABDOMEN AND PELVIS:  Evaluation of the solid abdominal organs is limited without IV contrast.  LIVER: Within normal limits.  BILE DUCTS: Normal caliber.  GALLBLADDER: Within normal limits.  SPLEEN: Within normal limits.  PANCREAS: Within normal limits.  ADRENALS: Within normal limits.  KIDNEYS/URETERS: Left renal cortical thinning related to chronic   obstruction.    BLADDER: Decompressed by Lorenzo catheter.  REPRODUCTIVE ORGANS: The prostate gland is enlarged, measuring 6 x 4.8 x   5.8 cm.    BOWEL: No bowel obstruction. Appendix is not visualized. The colon is   underdistended.  PERITONEUM: No ascites.  VESSELS: Aorta is not dilated. Moderate atherosclerotic vascular   calcification.  RETROPERITONEUM/LYMPH NODES: No lymphadenopathy.  ABDOMINAL WALL: Within normal limits.  BONES: Old left-sided rib fractures. Old fracture of the left inferior   pubic ramus.    IMPRESSION:  No acute findings in the chest, abdomen, or pelvis.        --- End of Report ---            ASIYA SCHUSTER MD; Attending Radiologist  This document has been electronically signed. Aug 19 2023  1:24AM          Time spent on this patient encounter, which includes documenting this note in the electronic medical record, was 76 minutes including assessing the presenting problems with associated risks, reviewing the medical record to prepare for the encounter, and meeting face to face with patient to obtain additional history. I have also performed an appropriate physical exam, made interventions listed and ordered and interpreted appropriate diagnostic studies as documented. To improve communication and patient saftey, I have coordinated care with the multidisciplinary team including the bedside nurse, appropriate attending of record and consultants as needed.   
  78 with apparent CKD IIIB with baseline near 1.5 mg/dl dementia, BPH, depression, and anemia presents to the ED with C-collar s/p fall. the patient is from [place] and was being ambulated to the bathroom when he fell to the floor and struck his head per documents. Patient was seen by surgical team and in ICU. Renal for ALEKSANDR on CKD         PAST MEDICAL & SURGICAL HISTORY:  Anemia      Depression      BPH (benign prostatic hyperplasia)      Dementia      H/O cervical spine surgery          MEDICATIONS  (STANDING):  dextrose 5% + sodium chloride 0.45%. 1000 milliLiter(s) (75 mL/Hr) IV Continuous <Continuous>  mirtazapine 7.5 milliGRAM(s) Oral at bedtime  piperacillin/tazobactam IVPB.. 3.375 Gram(s) IV Intermittent every 8 hours  polyethylene glycol 3350 17 Gram(s) Oral daily  senna 2 Tablet(s) Oral at bedtime  tamsulosin 0.4 milliGRAM(s) Oral at bedtime    MEDICATIONS  (PRN):  acetaminophen   IVPB .. 1000 milliGRAM(s) IV Intermittent once PRN Temp greater or equal to 38C (100.4F), Mild Pain (1 - 3)  ondansetron Injectable 4 milliGRAM(s) IV Push every 6 hours PRN Nausea      Allergies    No Known Allergies    Intolerances        SOCIAL HISTORY:    FAMILY HISTORY:  No pertinent family history in first degree relatives        REVIEW OF SYSTEMS:    UTO, dementia    T(C): , Max: 36.9 (08-21-23 @ 14:05)  T(F): , Max: 98.5 (08-22-23 @ 08:09)  HR: 59 (08-22-23 @ 08:09)  BP: 126/66 (08-22-23 @ 08:09)  BP(mean): 100 (08-21-23 @ 20:25)  RR: 18 (08-22-23 @ 08:09)  SpO2: 96% (08-22-23 @ 08:09)  Wt(kg): --    08-21 @ 07:01  -  08-22 @ 07:00  --------------------------------------------------------  IN: 0 mL / OUT: 1075 mL / NET: -1075 mL          PHYSICAL EXAM:    Constitutional: NAD, frail  HEENT: MM  dist  Cardiovascular: S1 and S2  Extremities: No peripheral edema  Neurological: Arousable  :   Lorenzo  Skin: No rashes  Access: Not applicable        LABS:                        7.2    4.97  )-----------( 197      ( 22 Aug 2023 10:17 )             23.4     22 Aug 2023 07:45    147    |  119    |  26     ----------------------------<  107    3.8     |  20     |  2.85   21 Aug 2023 05:38    145    |  120    |  35     ----------------------------<  103    3.8     |  19     |  2.88   20 Aug 2023 05:23    147    |  119    |  35     ----------------------------<  84     4.2     |  20     |  2.82   19 Aug 2023 05:41    143    |  117    |  36     ----------------------------<  101    3.9     |  21     |  2.51   18 Aug 2023 21:54    144    |  114    |  37     ----------------------------<  123    4.7     |  23     |  2.72     Ca    8.0        22 Aug 2023 07:45  Ca    8.0        21 Aug 2023 05:38  Ca    8.1        20 Aug 2023 05:23  Ca    7.9        19 Aug 2023 05:41  Ca    8.7        18 Aug 2023 21:54  Phos  3.2       22 Aug 2023 07:45  Phos  3.4       21 Aug 2023 05:38  Phos  3.8       20 Aug 2023 05:23  Phos  3.2       19 Aug 2023 05:41  Mg     2.0       22 Aug 2023 07:45  Mg     2.2       21 Aug 2023 05:38  Mg     2.2       20 Aug 2023 05:23  Mg     2.0       19 Aug 2023 05:41    TPro  7.7    /  Alb  2.9    /  TBili  0.5    /  DBili  x      /  AST  28     /  ALT  13     /  AlkPhos  101    18 Aug 2023 21:54          Urine Studies:  Urinalysis Basic - ( 22 Aug 2023 07:45 )    Color: x / Appearance: x / SG: x / pH: x  Gluc: 107 mg/dL / Ketone: x  / Bili: x / Urobili: x   Blood: x / Protein: x / Nitrite: x   Leuk Esterase: x / RBC: x / WBC x   Sq Epi: x / Non Sq Epi: x / Bacteria: x            RADIOLOGY & ADDITIONAL STUDIES:                
GI consult  late note entry-pt seen 815 am    HPI:  77 y/o M with a PMHx of dementia, BPH, depression, and anemia presents to the ED with C-collar s/p fall. the patient is from [place] and was being ambulated to the bathroom when he fell to the floor and struck his head. No LOC. as per aid.  Pt non verbal, combative.  Aide not present at bedside not able to obtain further history.    Called to evaluate "melena" that occurred overnight. Day RN has no additional information about this. Pt sleeping, difficult to wake, not participatory.    PAST MEDICAL & SURGICAL HISTORY:  Anemia      Depression      BPH (benign prostatic hyperplasia)      Dementia      H/O cervical spine surgery          Home Medications:  cyanocobalamin 1000 mcg oral tablet: 1 tab(s) orally once a day (01 Jun 2023 17:50)  docusate sodium 100 mg oral capsule: 1 cap(s) orally 2 times a day (01 Jun 2023 17:49)  ferrous sulfate 325 mg (65 mg elemental iron) oral tablet: 1 tab(s) orally 2 times a day (01 Jun 2023 17:49)  lactulose 10 g/15 mL oral syrup: 30 milliliter(s) orally every 12 hours (hold if BM within 24 hours) (01 Jun 2023 17:50)  mirtazapine 7.5 mg oral tablet: 1 tab(s) orally once a day (at bedtime) (01 Jun 2023 17:50)  Multiple Vitamins with Minerals oral tablet: 1 tab(s) orally once a day (01 Jun 2023 17:50)  polyethylene glycol 3350 oral powder for reconstitution: 17 gram(s) orally once a day (01 Jun 2023 17:50)  senna (sennosides) 8.6 mg oral tablet: 2 tab(s) orally once a day (at bedtime) (01 Jun 2023 17:50)  tamsulosin 0.4 mg oral capsule: 1 cap(s) orally once a day (01 Jun 2023 17:49)      MEDICATIONS  (STANDING):  dextrose 5% + sodium chloride 0.45%. 1000 milliLiter(s) (75 mL/Hr) IV Continuous <Continuous>  mirtazapine 7.5 milliGRAM(s) Oral at bedtime  pantoprazole    Tablet 40 milliGRAM(s) Oral before breakfast  piperacillin/tazobactam IVPB.. 3.375 Gram(s) IV Intermittent every 8 hours  polyethylene glycol 3350 17 Gram(s) Oral daily  senna 2 Tablet(s) Oral at bedtime  tamsulosin 0.4 milliGRAM(s) Oral at bedtime    MEDICATIONS  (PRN):  acetaminophen   IVPB .. 1000 milliGRAM(s) IV Intermittent once PRN Temp greater or equal to 38C (100.4F), Mild Pain (1 - 3)  ondansetron Injectable 4 milliGRAM(s) IV Push every 6 hours PRN Nausea      Allergies    No Known Allergies    Intolerances        SOCIAL HISTORY: cannot obtain    FAMILY HISTORY:  No pertinent family history in first degree relatives        ROS  As above  cannot obtain-pt nonverbal    PE:  Vital Signs Last 24 Hrs  T(C): 36.5 (23 Aug 2023 15:38), Max: 37.1 (22 Aug 2023 20:41)  T(F): 97.7 (23 Aug 2023 15:38), Max: 98.8 (22 Aug 2023 20:41)  HR: 68 (23 Aug 2023 15:38) (59 - 69)  BP: 158/76 (23 Aug 2023 15:38) (142/75 - 158/76)  BP(mean): --  RR: 18 (23 Aug 2023 15:38) (18 - 18)  SpO2: 98% (23 Aug 2023 15:38) (98% - 100%)    Parameters below as of 23 Aug 2023 15:38  Patient On (Oxygen Delivery Method): room air    Constitutional: NAD, sleeping, not verbal  Anicteric   Respiratory: CTABL, breathing comfortably  Cardiovascular: S1 and S2, RRR  Gastrointestinal: +BS, soft, non tender, non distended, no mass  Extremities: warm, well perfused, no edema  Psychiatric: cannot assess  Neuro: cannot assess  Skin: No rashes or lesions    LABS:                        8.5    5.75  )-----------( 250      ( 23 Aug 2023 11:26 )             27.8     08-23    143  |  119<H>  |  25<H>  ----------------------------<  106<H>  4.0   |  19<L>  |  2.69<H>    Ca    7.9<L>      23 Aug 2023 07:28  Phos  3.0     08-23  Mg     2.0     08-23            RADIOLOGY & ADDITIONAL STUDIES:

## 2023-08-23 NOTE — CONSULT NOTE ADULT - ASSESSMENT
reported melena x 1 overnight, FOBT negative however  hgb stable after 1 unit    Rec:  -ppi daily  -resume diet  -check AM hgb  -if stable can d/c pt  -if has repeated evidence of bleeding can do EGD if needed  -d/w RN
Pt is a 77 y/o M pmhx of dementia, BPH, depression and anemia who presents into  ED w. complaints of s/p fall. Pt was in C collar prior to admission due to a fall w/ + head strike but no LOC. In ED pt CTH showed no intracranial abnormalities, but revealed intra-articular lucency in R C2 lateral mass favored to represent residual nonunion fx over recurrent fx and mild loosening of R C2 pedicle screw w/ loosening of suboccipital plate.     1. C2 non union fx   2. UTI  3. Acidosis   4. ALEKSANDR    Plan:   - Trauma admitted to ICU for further eval and monitoring   - Q 2 hour neuro checks, will opt to rescan if any changes noted.   - C-collar to remain in place, pain control as needed, will opt to use IV Tylenol's   - + UA started on zosyn, continue, cultures sent narrow abx as indicated and trend markers of infection daily.   - ALEKSANDR appears to be on CKD, continue to monitor U/O and maintain > 0.5 ml/kg/hr and avoid nephrotoxic meds.   - Acidosis resolved in ED following IVF.   
 78 with apparent CKD IIIB with baseline near 1.5 mg/dl dementia, BPH, depression, and anemia presents to the ED with C-collar s/p fall. the patient is from [place] and was being ambulated to the bathroom when he fell to the floor and struck his head per documents. Patient was seen by surgical team and in ICU. Renal for ALEKSANDR on CKD    ALEKSANDR on CKD III  -Renal function stabilizing, hopeful plateau  -Keep net positive, limited intake  -Convert to hypotonic IVF w rising Na  -No nsaids/contrast  -CT no hydro, US if rise in function    Anemia  -Per medical/surgical teams    dc with RN staff

## 2023-08-24 LAB
ANION GAP SERPL CALC-SCNC: 5 MMOL/L — SIGNIFICANT CHANGE UP (ref 5–17)
BASOPHILS # BLD AUTO: 0.01 K/UL — SIGNIFICANT CHANGE UP (ref 0–0.2)
BASOPHILS NFR BLD AUTO: 0.1 % — SIGNIFICANT CHANGE UP (ref 0–2)
BUN SERPL-MCNC: 20 MG/DL — SIGNIFICANT CHANGE UP (ref 7–23)
CALCIUM SERPL-MCNC: 8.3 MG/DL — LOW (ref 8.5–10.1)
CHLORIDE SERPL-SCNC: 119 MMOL/L — HIGH (ref 96–108)
CO2 SERPL-SCNC: 20 MMOL/L — LOW (ref 22–31)
CREAT SERPL-MCNC: 2.55 MG/DL — HIGH (ref 0.5–1.3)
CULTURE RESULTS: SIGNIFICANT CHANGE UP
CULTURE RESULTS: SIGNIFICANT CHANGE UP
EGFR: 25 ML/MIN/1.73M2 — LOW
EOSINOPHIL # BLD AUTO: 0.71 K/UL — HIGH (ref 0–0.5)
EOSINOPHIL NFR BLD AUTO: 9.2 % — HIGH (ref 0–6)
GLUCOSE SERPL-MCNC: 105 MG/DL — HIGH (ref 70–99)
HCT VFR BLD CALC: 32.8 % — LOW (ref 39–50)
HGB BLD-MCNC: 10.1 G/DL — LOW (ref 13–17)
IMM GRANULOCYTES NFR BLD AUTO: 0.6 % — SIGNIFICANT CHANGE UP (ref 0–0.9)
LYMPHOCYTES # BLD AUTO: 1.33 K/UL — SIGNIFICANT CHANGE UP (ref 1–3.3)
LYMPHOCYTES # BLD AUTO: 17.2 % — SIGNIFICANT CHANGE UP (ref 13–44)
MAGNESIUM SERPL-MCNC: 1.9 MG/DL — SIGNIFICANT CHANGE UP (ref 1.6–2.6)
MCHC RBC-ENTMCNC: 26.4 PG — LOW (ref 27–34)
MCHC RBC-ENTMCNC: 30.8 GM/DL — LOW (ref 32–36)
MCV RBC AUTO: 85.6 FL — SIGNIFICANT CHANGE UP (ref 80–100)
MONOCYTES # BLD AUTO: 1.17 K/UL — HIGH (ref 0–0.9)
MONOCYTES NFR BLD AUTO: 15.1 % — HIGH (ref 2–14)
NEUTROPHILS # BLD AUTO: 4.48 K/UL — SIGNIFICANT CHANGE UP (ref 1.8–7.4)
NEUTROPHILS NFR BLD AUTO: 57.8 % — SIGNIFICANT CHANGE UP (ref 43–77)
PHOSPHATE SERPL-MCNC: 3 MG/DL — SIGNIFICANT CHANGE UP (ref 2.5–4.5)
PLATELET # BLD AUTO: 319 K/UL — SIGNIFICANT CHANGE UP (ref 150–400)
POTASSIUM SERPL-MCNC: 3.6 MMOL/L — SIGNIFICANT CHANGE UP (ref 3.5–5.3)
POTASSIUM SERPL-SCNC: 3.6 MMOL/L — SIGNIFICANT CHANGE UP (ref 3.5–5.3)
RBC # BLD: 3.83 M/UL — LOW (ref 4.2–5.8)
RBC # FLD: 17.2 % — HIGH (ref 10.3–14.5)
SODIUM SERPL-SCNC: 144 MMOL/L — SIGNIFICANT CHANGE UP (ref 135–145)
SPECIMEN SOURCE: SIGNIFICANT CHANGE UP
SPECIMEN SOURCE: SIGNIFICANT CHANGE UP
WBC # BLD: 7.75 K/UL — SIGNIFICANT CHANGE UP (ref 3.8–10.5)
WBC # FLD AUTO: 7.75 K/UL — SIGNIFICANT CHANGE UP (ref 3.8–10.5)

## 2023-08-24 RX ADMIN — PANTOPRAZOLE SODIUM 40 MILLIGRAM(S): 20 TABLET, DELAYED RELEASE ORAL at 05:26

## 2023-08-24 RX ADMIN — POLYETHYLENE GLYCOL 3350 17 GRAM(S): 17 POWDER, FOR SOLUTION ORAL at 11:29

## 2023-08-24 RX ADMIN — PIPERACILLIN AND TAZOBACTAM 25 GRAM(S): 4; .5 INJECTION, POWDER, LYOPHILIZED, FOR SOLUTION INTRAVENOUS at 21:10

## 2023-08-24 RX ADMIN — SODIUM CHLORIDE 75 MILLILITER(S): 9 INJECTION, SOLUTION INTRAVENOUS at 21:10

## 2023-08-24 RX ADMIN — PIPERACILLIN AND TAZOBACTAM 25 GRAM(S): 4; .5 INJECTION, POWDER, LYOPHILIZED, FOR SOLUTION INTRAVENOUS at 05:25

## 2023-08-24 RX ADMIN — PIPERACILLIN AND TAZOBACTAM 25 GRAM(S): 4; .5 INJECTION, POWDER, LYOPHILIZED, FOR SOLUTION INTRAVENOUS at 17:07

## 2023-08-24 NOTE — PROGRESS NOTE ADULT - SUBJECTIVE AND OBJECTIVE BOX
CC:Patient is a 78y old  Male who presents with a chief complaint of Fall (22 Aug 2023 11:49)      Subjective:  Pt seen and examined at bedside. Pt had dark stool, h/h trended down, received 1 uPRBC, guaic test was negative. GI consulted reccs pending.. Pt is AAOx3, pt in no acute distress. Pt denies fever, chills, chest pain, SOB, abd pain, N/V/D, extremity pain or dysfunction, hemoptysis, hematemesis, hematuria, hematochezia headache, diplopia, vertigo, dizziness Pt tolerating diet, (+) void, (+) ambulation, (+) bowel function    ROS:  otherwise as abovementioned ROS    Vital Signs Last 24 Hrs  T(C): 37.1 (22 Aug 2023 20:41), Max: 37.2 (22 Aug 2023 14:02)  T(F): 98.8 (22 Aug 2023 20:41), Max: 98.9 (22 Aug 2023 14:02)  HR: 59 (22 Aug 2023 20:41) (59 - 75)  BP: 149/76 (22 Aug 2023 20:41) (126/66 - 152/82)  BP(mean): --  RR: 18 (22 Aug 2023 20:41) (18 - 18)  SpO2: 100% (22 Aug 2023 20:41) (96% - 100%)    Parameters below as of 22 Aug 2023 20:41  Patient On (Oxygen Delivery Method): room air        Labs:                                8.7    6.15  )-----------( 259      ( 22 Aug 2023 22:00 )             27.9     CBC Full  -  ( 22 Aug 2023 22:00 )  WBC Count : 6.15 K/uL  RBC Count : 3.27 M/uL  Hemoglobin : 8.7 g/dL  Hematocrit : 27.9 %  Platelet Count - Automated : 259 K/uL  Mean Cell Volume : 85.3 fl  Mean Cell Hemoglobin : 26.6 pg  Mean Cell Hemoglobin Concentration : 31.2 gm/dL  Auto Neutrophil # : x  Auto Lymphocyte # : x  Auto Monocyte # : x  Auto Eosinophil # : x  Auto Basophil # : x  Auto Neutrophil % : x  Auto Lymphocyte % : x  Auto Monocyte % : x  Auto Eosinophil % : x  Auto Basophil % : x    08-22    147<H>  |  119<H>  |  26<H>  ----------------------------<  107<H>  3.8   |  20<L>  |  2.85<H>    Ca    8.0<L>      22 Aug 2023 07:45  Phos  3.2     08-22  Mg     2.0     08-22              Meds:  acetaminophen   IVPB .. 1000 milliGRAM(s) IV Intermittent once PRN  dextrose 5% + sodium chloride 0.45%. 1000 milliLiter(s) IV Continuous <Continuous>  mirtazapine 7.5 milliGRAM(s) Oral at bedtime  ondansetron Injectable 4 milliGRAM(s) IV Push every 6 hours PRN  piperacillin/tazobactam IVPB.. 3.375 Gram(s) IV Intermittent every 8 hours  polyethylene glycol 3350 17 Gram(s) Oral daily  senna 2 Tablet(s) Oral at bedtime  tamsulosin 0.4 milliGRAM(s) Oral at bedtime      Radiology:      Physical exam:  Pt is aaox3  Pt in no acute distress  Psych: normal affect  Resp: CTAB  CVS: S1S2(+)  ABD: bowel sounds (+), soft, non distended, no rebound, no guarding, no rigidity, no skin changes to exam.   EXT: no calf tenderness or edema to exam b/l, on VTE prophylaxis  Skin: no adverse skin changes to exam      
Patient is a 78y Male who reports no complaints overnight.    MEDICATIONS  (STANDING):  dextrose 5% + sodium chloride 0.45%. 1000 milliLiter(s) (75 mL/Hr) IV Continuous <Continuous>  mirtazapine 7.5 milliGRAM(s) Oral at bedtime  pantoprazole    Tablet 40 milliGRAM(s) Oral before breakfast  piperacillin/tazobactam IVPB.. 3.375 Gram(s) IV Intermittent every 8 hours  polyethylene glycol 3350 17 Gram(s) Oral daily  senna 2 Tablet(s) Oral at bedtime  tamsulosin 0.4 milliGRAM(s) Oral at bedtime    MEDICATIONS  (PRN):  acetaminophen   IVPB .. 1000 milliGRAM(s) IV Intermittent once PRN Temp greater or equal to 38C (100.4F), Mild Pain (1 - 3)  ondansetron Injectable 4 milliGRAM(s) IV Push every 6 hours PRN Nausea        T(C): , Max: 37.2 (08-22-23 @ 14:02)  T(F): , Max: 98.9 (08-22-23 @ 14:02)  HR: 69 (08-23-23 @ 08:29)  BP: 142/75 (08-23-23 @ 08:29)  BP(mean): --  RR: 18 (08-23-23 @ 08:29)  SpO2: 100% (08-23-23 @ 08:29)  Wt(kg): --    08-22 @ 07:01  -  08-23 @ 07:00  --------------------------------------------------------  IN: 669 mL / OUT: 1025 mL / NET: -356 mL          PHYSICAL EXAM:    Constitutional: frail, ill appearing  HEENT:  MM  dist  Cardiovascular: S1 and S2   Extremities: No peripheral edema  Neurological: Alert  : cath           LABS:                        8.8    5.17  )-----------( 216      ( 23 Aug 2023 07:28 )             27.7     23 Aug 2023 07:28    143    |  119    |  25     ----------------------------<  106    4.0     |  19     |  2.69   22 Aug 2023 07:45    147    |  119    |  26     ----------------------------<  107    3.8     |  20     |  2.85   21 Aug 2023 05:38    145    |  120    |  35     ----------------------------<  103    3.8     |  19     |  2.88   20 Aug 2023 05:23    147    |  119    |  35     ----------------------------<  84     4.2     |  20     |  2.82     Ca    7.9        23 Aug 2023 07:28  Ca    8.0        22 Aug 2023 07:45  Ca    8.0        21 Aug 2023 05:38  Ca    8.1        20 Aug 2023 05:23  Phos  3.0       23 Aug 2023 07:28  Phos  3.2       22 Aug 2023 07:45  Phos  3.4       21 Aug 2023 05:38  Phos  3.8       20 Aug 2023 05:23  Mg     2.0       23 Aug 2023 07:28  Mg     2.0       22 Aug 2023 07:45  Mg     2.2       21 Aug 2023 05:38  Mg     2.2       20 Aug 2023 05:23            Urine Studies:  Urinalysis Basic - ( 23 Aug 2023 07:28 )    Color: x / Appearance: x / SG: x / pH: x  Gluc: 106 mg/dL / Ketone: x  / Bili: x / Urobili: x   Blood: x / Protein: x / Nitrite: x   Leuk Esterase: x / RBC: x / WBC x   Sq Epi: x / Non Sq Epi: x / Bacteria: x            RADIOLOGY & ADDITIONAL STUDIES:              
Progress Note: Orthopedic Surgery - Spine Service    Patient interviewed and examined at bedside in SICU. Patient with improved mentation relative to overnight examination, and is A&Ox1-2. Patient no longer combative but remains agitated. Patient is minimally participatory in question-based interview and refuses to participate in prompt-based physical examination. Regrading the latter, the patient states that he has already been seen by a doctor and wished to be Left alone. Per RN, patient verbally abusive overnight and articulated passive suicidal ideation. Patients answers in the affirmative to the entirety of offered review of systems, but remains unreliable historian. However, patient states he does remember falling yesterday; adds he is uncertain of a corresponding head-strike. In response to orthopedic provider's self identification as a representative of the orthopedic spine service, patient repeats "I don't want it" multiple times."        PHYSICAL EXAM  GEN: NAD, AAOx3    SPINE:  Skin intact; Hard cervical collar in place.   Non-TTP over the midline bony prominences of the cervical, thoracic, or lumbar spine.    No bony step-offs.   Grossly moving all extremities.   SILT throughout all extremities.   + Radial pulses bilaterally.   + DP/PT pulses bilaterally.       Unable to conduct ERICH-based scored spinal examination in the setting of underlying mental status. However, as above, patient able to demonstrate gross motor function in all four extremities and responds to (via withdrawal from or agitated response to) noxious stimuli in all four extremities.     Unable to accurately elicit bilateral Jean, Myoclonus, or Babinski signs in the setting of the above.     REPEAT SECONDARY SURVEY:  RLE/LLE/RUE/LUE: No TTP over bony prominences, SILT, palpable pulses, full/painless range of motion, compartments soft.       IMAGING:  < from: CT Head No Cont (08.18.23 @ 21:18) >  CT cervical spine:  Reversal of the cervical lordosis, new compared to the prior. Stigmata of prior fracture at the base of the odontoid process. 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 Craniocervical alignment is maintained. Re-demonstrated surgical fusion from the posterior occiput to C4. Metallic hardware is intact. 4 mm posterior separation of the left aspect of the suboccipital plate is new compared to the prior. Stable posterior separation of the right aspect of the suboccipital plate. New lucency surrounds the right C2 pedicle screw. No acute compression fracture or prevertebral edema. No gross upper cervical canal hematoma or mass. Cervical soft tissues are unremarkable.      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       PLAN:  - Bedrest.   - Cervical collar at all times.   - NPO except medications.   - IVF while NPO.   - Hold AC.   - Pain control.   - Will collect collateral information form patient Health Care Proxy / Next of Kin / [place] facility.   - No acute orthopaedic surgical intervention indicated at this time; however, will re-evaluate based on discussion with Attending Spine Surgeon.   - Medical Management appreciated.   - Will discuss with Dr. Trotter and advise if plan changes.        
CC:Patient is a 78y old  Male who presents with a chief complaint of Fall (20 Aug 2023 13:18)      Subjective:  Pt seen and examined at bedside, pt is awake, alert, baseline demnetia, uncooperative to exam, pt in no acute distress. No reported c/o fever, chills, chest pain, SOB, abd pain, N/V/D, extremity pain or dysfunction, hemoptysis, hematemesis, hematuria, hematochexia, headache, diplopia, vertigo, dizzyness.     ROS:  limited secondary to dementia, otherwise as abovementioned ROS    Vital Signs Last 24 Hrs  T(C): 36.9 (20 Aug 2023 10:09), Max: 38.3 (19 Aug 2023 16:57)  T(F): 98.4 (20 Aug 2023 10:09), Max: 100.9 (19 Aug 2023 16:57)  HR: 52 (20 Aug 2023 13:00) (46 - 105)  BP: 150/69 (20 Aug 2023 13:00) (123/79 - 161/93)  BP(mean): 92 (20 Aug 2023 13:00) (69 - 107)  RR: 18 (20 Aug 2023 12:00) (13 - 25)  SpO2: 96% (20 Aug 2023 12:00) (95% - 99%)    Parameters below as of 20 Aug 2023 12:00  Patient On (Oxygen Delivery Method): room air        Labs:      CARDIAC MARKERS ( 20 Aug 2023 05:23 )  x     / x     / 740 U/L / x     / x                                8.0    9.37  )-----------( 203      ( 20 Aug 2023 05:23 )             26.4     CBC Full  -  ( 20 Aug 2023 05:23 )  WBC Count : 9.37 K/uL  RBC Count : 3.01 M/uL  Hemoglobin : 8.0 g/dL  Hematocrit : 26.4 %  Platelet Count - Automated : 203 K/uL  Mean Cell Volume : 87.7 fl  Mean Cell Hemoglobin : 26.6 pg  Mean Cell Hemoglobin Concentration : 30.3 gm/dL  Auto Neutrophil # : x  Auto Lymphocyte # : x  Auto Monocyte # : x  Auto Eosinophil # : x  Auto Basophil # : x  Auto Neutrophil % : x  Auto Lymphocyte % : x  Auto Monocyte % : x  Auto Eosinophil % : x  Auto Basophil % : x    08-20    147<H>  |  119<H>  |  35<H>  ----------------------------<  84  4.2   |  20<L>  |  2.82<H>    Ca    8.1<L>      20 Aug 2023 05:23  Phos  3.8     08-20  Mg     2.2     08-20    TPro  7.7  /  Alb  2.9<L>  /  TBili  0.5  /  DBili  x   /  AST  28  /  ALT  13  /  AlkPhos  101  08-18    LIVER FUNCTIONS - ( 18 Aug 2023 21:54 )  Alb: 2.9 g/dL / Pro: 7.7 gm/dL / ALK PHOS: 101 U/L / ALT: 13 U/L / AST: 28 U/L / GGT: x           PT/INR - ( 18 Aug 2023 21:54 )   PT: 13.7 sec;   INR: 1.22 ratio         PTT - ( 18 Aug 2023 21:54 )  PTT:30.4 sec      Meds:  acetaminophen   IVPB .. 1000 milliGRAM(s) IV Intermittent once PRN  dextrose 5% + sodium chloride 0.45%. 1000 milliLiter(s) IV Continuous <Continuous>  ketorolac   Injectable 15 milliGRAM(s) IV Push once PRN  mirtazapine 7.5 milliGRAM(s) Oral at bedtime  ondansetron Injectable 4 milliGRAM(s) IV Push every 6 hours PRN  piperacillin/tazobactam IVPB.. 3.375 Gram(s) IV Intermittent every 8 hours  polyethylene glycol 3350 17 Gram(s) Oral daily  senna 2 Tablet(s) Oral at bedtime  tamsulosin 0.4 milliGRAM(s) Oral at bedtime      Radiology:      Physical exam:  Pt in no acute distress  Airway is patent  Breathing is symmetric and unlabored  Uncooperative to exam  Neuro: CN II-XII grossly intact to limited exam  Psych: dementia  HEENT: normocephalic, REJI, EOM wnl, no gross craniofacial bony pathology to exam  Neck: No tracheal deviation, no JVD, no crepitus, no ecchymosis, no hematoma  Chest: No gross rib or sternal pathology or tenderness to exam, no crepitus, no ecchymosis, no hematoma  Resp: CTAB  CVS: S1S2(+)  ABD: bowel sounds (+), soft, grossly nontender, non distended  EXT: no gross calf tenderness or edema to exam b/l, pt on VTE prophylaxis  Skin: no adverse skin changes to exam since admission      
CC:Patient is a 78y old  Male who presents with a chief complaint of Fall (20 Aug 2023 16:33)      Subjective:  Pt seen and examined at bedside. Pt is awake, alert, baseline demnetia, uncooperative to exam, pt in no acute distress. No reported c/o fever, chills, chest pain, SOB, abd pain, N/V/D, extremity pain or dysfunction, hemoptysis, hematemesis, hematuria, hematochexia, headache, diplopia, vertigo. pt seen by Spine, C-collar removed and PT recommended [facility] vs long term SNF      ROS:  otherwise as abovementioned ROS    Vital Signs Last 24 Hrs  T(C): 36.7 (20 Aug 2023 20:49), Max: 37.5 (20 Aug 2023 06:00)  T(F): 98.1 (20 Aug 2023 20:49), Max: 99.5 (20 Aug 2023 06:00)  HR: 87 (21 Aug 2023 04:00) (46 - 92)  BP: 158/133 (21 Aug 2023 04:00) (121/67 - 158/133)  BP(mean): 144 (21 Aug 2023 04:00) (80 - 144)  RR: 19 (21 Aug 2023 04:00) (11 - 23)  SpO2: 95% (20 Aug 2023 22:00) (95% - 99%)    Parameters below as of 20 Aug 2023 22:00  Patient On (Oxygen Delivery Method): room air        Labs:      CARDIAC MARKERS ( 20 Aug 2023 05:23 )  x     / x     / 740 U/L / x     / x                                8.0    9.37  )-----------( 203      ( 20 Aug 2023 05:23 )             26.4     CBC Full  -  ( 20 Aug 2023 05:23 )  WBC Count : 9.37 K/uL  RBC Count : 3.01 M/uL  Hemoglobin : 8.0 g/dL  Hematocrit : 26.4 %  Platelet Count - Automated : 203 K/uL  Mean Cell Volume : 87.7 fl  Mean Cell Hemoglobin : 26.6 pg  Mean Cell Hemoglobin Concentration : 30.3 gm/dL  Auto Neutrophil # : x  Auto Lymphocyte # : x  Auto Monocyte # : x  Auto Eosinophil # : x  Auto Basophil # : x  Auto Neutrophil % : x  Auto Lymphocyte % : x  Auto Monocyte % : x  Auto Eosinophil % : x  Auto Basophil % : x    08-20    147<H>  |  119<H>  |  35<H>  ----------------------------<  84  4.2   |  20<L>  |  2.82<H>    Ca    8.1<L>      20 Aug 2023 05:23  Phos  3.8     08-20  Mg     2.2     08-20              Meds:  acetaminophen   IVPB .. 1000 milliGRAM(s) IV Intermittent once PRN  dextrose 5% + sodium chloride 0.45%. 1000 milliLiter(s) IV Continuous <Continuous>  ketorolac   Injectable 15 milliGRAM(s) IV Push once PRN  mirtazapine 7.5 milliGRAM(s) Oral at bedtime  ondansetron Injectable 4 milliGRAM(s) IV Push every 6 hours PRN  piperacillin/tazobactam IVPB.. 3.375 Gram(s) IV Intermittent every 8 hours  polyethylene glycol 3350 17 Gram(s) Oral daily  senna 2 Tablet(s) Oral at bedtime  tamsulosin 0.4 milliGRAM(s) Oral at bedtime    Physical exam  Awake, in no acute distress  Airway is patent  Breathing is symmetric and unlabored  Neuro: CN II-XII grossly intact to limited exam  Psych: dementia  HEENT: normocephalic, REJI, EOM wnl, no gross craniofacial bony pathology to exam  Neck: No tracheal deviation, no JVD, no crepitus, no ecchymosis, no hematoma  Chest: No gross rib or sternal pathology or tenderness to exam, no crepitus, no ecchymosis, no hematoma  Resp: CTAB  CVS: S1S2(+)  ABD: bowel sounds (+), soft, grossly nontender, non distended  EXT: no gross calf tenderness or edema to exam b/l, pt on VTE prophylaxis  Skin: no adverse skin changes to exam since admission      
  Patient is a 78M with an unknown baseline ambulatory status (suspected home-ambulator with assistive device and/or assistance, but unconfirmed) who presents to the [facility] ED status-post witnessed fall at his nursing home ([place]) earlier today. Per chart review and ED documentation, patient was being ambulated ot the bathroom when he fell and struck his head without an accompanying loss of consciousness. Patient bears an underlying diagnosis of dementia (A&Ox0) and as such exhibits minimal capacity to participate in a question-directed interview or prompt-based physical examination (patient becomes combative upon provider's attempt to initiate the latter).  Patient unable to confirm or deny any new-onset pain, weakness, numbness, or tingling in the bilateral upper or lower extremities; similarly, patient unable to participate in review of systems. Of note, patient sustained a cervical spine fracture in 2021 treated with an Occiput-C4 Posterior Spinal Fusion (Dr. Dove, '21). Orthopaedic Spine Service contacted for patient evaluation today after CT Cervical Spine demonstrated an "intra-articular lucency in the Right C2 lateral mass favored to represent residual nonunion fracture over recurrent fracture... Mild loosening of the right C2 pedicle screw... and progression of loosening of the suboccipital plate." Sepsis Criteria met in ED and patient admitted to Internal Medicine for further evaluation and management.     8/19/23 Patient awake and confused- name calling but not responsive appropriately to questions  8/20/23 Patient sleeping on arrival-not cooperative when awakened    PAST MEDICAL & SURGICAL HISTORY:  Anemia      Depression      BPH (benign prostatic hyperplasia)      Dementia      No significant past surgical history      MEDICATIONS  (STANDING):  mirtazapine 7.5 milliGRAM(s) Oral at bedtime  piperacillin/tazobactam IVPB.. 3.375 Gram(s) IV Intermittent every 8 hours  polyethylene glycol 3350 17 Gram(s) Oral daily  senna 2 Tablet(s) Oral at bedtime  sodium chloride 0.9%. 1000 milliLiter(s) (150 mL/Hr) IV Continuous <Continuous>  tamsulosin 0.4 milliGRAM(s) Oral at bedtime    MEDICATIONS  (PRN):  acetaminophen   IVPB .. 1000 milliGRAM(s) IV Intermittent once PRN Temp greater or equal to 38C (100.4F), Mild Pain (1 - 3)  ketorolac   Injectable 15 milliGRAM(s) IV Push once PRN Moderate Pain (4 - 6)  ondansetron Injectable 4 milliGRAM(s) IV Push every 6 hours PRN Nausea    Allergies    No Known Allergies    Intolerances    PE:    Vital Signs Last 24 Hrs  T(C): 36.9 (20 Aug 2023 10:09), Max: 38.3 (19 Aug 2023 16:57)  T(F): 98.4 (20 Aug 2023 10:09), Max: 100.9 (19 Aug 2023 16:57)  HR: 52 (20 Aug 2023 13:00) (46 - 105)  BP: 150/69 (20 Aug 2023 13:00) (123/79 - 161/93)  BP(mean): 92 (20 Aug 2023 13:00) (69 - 107)  RR: 18 (20 Aug 2023 12:00) (13 - 25)  SpO2: 96% (20 Aug 2023 12:00) (95% - 99%)    Parameters below as of 20 Aug 2023 12:00  Patient On (Oxygen Delivery Method): room air    Patient awakens but not responding to commands-combative  Unable to assess if he has tenderness  Moving all extremities spontaneously but not able to assess strength  Negative Leela's/clonus/hyperreflexia    STUDIES  CT head/neck    Reversal of the cervical lordosis, new compared to the prior. Stigmata of   prior fracture at the base of the odontoid process. Residual partial   nonunion fracture versus recurrent fracture involving the right C2   lateral mass, seen on axial series 605, images 33-35. Craniocervical   alignment is maintained. Redemonstrated surgical fusion from the   posterior occiput to C4. Metallic hardware is intact. 4 mm posterior   separation of the left aspect of the suboccipital plate is new compared   to the prior. Stable posterior separation of the right aspect of the   suboccipital plate. New lucency surrounds the right C2 pedicle screw. No   acute compression fracture or prevertebral edema.    No gross upper cervical canal hematoma or mass.  Cervical soft tissues are unremarkable.    
CC:Patient is a 78y old  Male who presents with a chief complaint of Fall (21 Aug 2023 17:00)      Subjective:  Pt seen and examined at bedside. Pt was downgraded from SICU to floor.  Pt is awake and in no acute distress. Pt denies fever, chills, chest pain, SOB, abd pain, N/V/D, extremity pain or dysfunction, hemoptysis, hematemesis, hematuria, hematochezia headache, diplopia, vertigo, dizziness Pt tolerating diet, (+) void,  (+) bowel function    ROS:  otherwise as abovementioned ROS    Vital Signs Last 24 Hrs  T(C): 36.8 (21 Aug 2023 21:30), Max: 36.9 (21 Aug 2023 14:05)  T(F): 98.2 (21 Aug 2023 21:30), Max: 98.4 (21 Aug 2023 14:05)  HR: 95 (21 Aug 2023 21:30) (48 - 97)  BP: 159/88 (21 Aug 2023 21:30) (101/58 - 173/84)  BP(mean): 100 (21 Aug 2023 20:25) (72 - 120)  RR: 18 (21 Aug 2023 21:30) (14 - 26)  SpO2: 96% (21 Aug 2023 21:30) (95% - 98%)    Parameters below as of 21 Aug 2023 21:30  Patient On (Oxygen Delivery Method): room air        Labs:      CARDIAC MARKERS ( 20 Aug 2023 05:23 )  x     / x     / 740 U/L / x     / x                                8.7    7.76  )-----------( 243      ( 21 Aug 2023 05:38 )             28.5     CBC Full  -  ( 21 Aug 2023 05:38 )  WBC Count : 7.76 K/uL  RBC Count : 3.32 M/uL  Hemoglobin : 8.7 g/dL  Hematocrit : 28.5 %  Platelet Count - Automated : 243 K/uL  Mean Cell Volume : 85.8 fl  Mean Cell Hemoglobin : 26.2 pg  Mean Cell Hemoglobin Concentration : 30.5 gm/dL  Auto Neutrophil # : 5.60 K/uL  Auto Lymphocyte # : 0.83 K/uL  Auto Monocyte # : 0.99 K/uL  Auto Eosinophil # : 0.27 K/uL  Auto Basophil # : 0.04 K/uL  Auto Neutrophil % : 72.1 %  Auto Lymphocyte % : 10.7 %  Auto Monocyte % : 12.8 %  Auto Eosinophil % : 3.5 %  Auto Basophil % : 0.5 %    08-21    145  |  120<H>  |  35<H>  ----------------------------<  103<H>  3.8   |  19<L>  |  2.88<H>    Ca    8.0<L>      21 Aug 2023 05:38  Phos  3.4     08-21  Mg     2.2     08-21              Meds:  acetaminophen   IVPB .. 1000 milliGRAM(s) IV Intermittent once PRN  dextrose 5% + sodium chloride 0.45%. 1000 milliLiter(s) IV Continuous <Continuous>  mirtazapine 7.5 milliGRAM(s) Oral at bedtime  ondansetron Injectable 4 milliGRAM(s) IV Push every 6 hours PRN  piperacillin/tazobactam IVPB.. 3.375 Gram(s) IV Intermittent every 8 hours  polyethylene glycol 3350 17 Gram(s) Oral daily  senna 2 Tablet(s) Oral at bedtime  tamsulosin 0.4 milliGRAM(s) Oral at bedtime      Radiology:      Physical exam:  Awake, in no acute distress  Airway is patent  Breathing is symmetric and unlabored  Neuro: CN II-XII grossly intact to limited exam  Psych: dementia  HEENT: normocephalic, REJI, EOM wnl, no gross craniofacial bony pathology to exam  Neck: No tracheal deviation, no JVD, no crepitus, no ecchymosis, no hematoma  Chest: No gross rib or sternal pathology or tenderness to exam, no crepitus, no ecchymosis, no hematoma  Resp: CTAB  CVS: S1S2(+)  ABD: bowel sounds (+), soft, grossly nontender, non distended  EXT: no gross calf tenderness or edema to exam b/l, pt on VTE prophylaxis  Skin: no adverse skin changes to exam since admission      
CC:Patient is a 78y old  Male who presents with a chief complaint of Fall (22 Aug 2023 11:49)      Subjective:  Pt seen and examined at bedside. Pt had dark stool, h/h trended down, received 1 uPRBC, guaic test was negative. GI consulted reccs repeat hg prior to DC today. Pt is AAOx3, pt in no acute distress. Pt denies fever, chills, chest pain, SOB, abd pain, N/V/D, extremity pain or dysfunction, hemoptysis, hematemesis, hematuria, hematochezia headache, diplopia, vertigo, dizziness Pt tolerating diet, (+) void, (+) ambulation, (+) bowel function    ROS:  otherwise as abovementioned ROS    VVital Signs Last 24 Hrs  T(C): 37.7 (23 Aug 2023 20:45), Max: 37.7 (23 Aug 2023 20:45)  T(F): 99.9 (23 Aug 2023 20:45), Max: 99.9 (23 Aug 2023 20:45)  HR: 75 (23 Aug 2023 20:45) (68 - 75)  BP: 141/90 (23 Aug 2023 20:45) (141/90 - 158/76)  BP(mean): --  RR: 18 (23 Aug 2023 20:45) (18 - 18)  SpO2: 96% (23 Aug 2023 20:45) (96% - 100%)    Parameters below as of 23 Aug 2023 20:45  Patient On (Oxygen Delivery Method): room air          Labs:                                8.7    6.15  )-----------( 259      ( 22 Aug 2023 22:00 )             27.9     CBC Full  -  ( 22 Aug 2023 22:00 )  WBC Count : 6.15 K/uL  RBC Count : 3.27 M/uL  Hemoglobin : 8.7 g/dL  Hematocrit : 27.9 %  Platelet Count - Automated : 259 K/uL  Mean Cell Volume : 85.3 fl  Mean Cell Hemoglobin : 26.6 pg  Mean Cell Hemoglobin Concentration : 31.2 gm/dL  Auto Neutrophil # : x  Auto Lymphocyte # : x  Auto Monocyte # : x  Auto Eosinophil # : x  Auto Basophil # : x  Auto Neutrophil % : x  Auto Lymphocyte % : x  Auto Monocyte % : x  Auto Eosinophil % : x  Auto Basophil % : x    08-22    147<H>  |  119<H>  |  26<H>  ----------------------------<  107<H>  3.8   |  20<L>  |  2.85<H>    Ca    8.0<L>      22 Aug 2023 07:45  Phos  3.2     08-22  Mg     2.0     08-22              Meds:  acetaminophen   IVPB .. 1000 milliGRAM(s) IV Intermittent once PRN  dextrose 5% + sodium chloride 0.45%. 1000 milliLiter(s) IV Continuous <Continuous>  mirtazapine 7.5 milliGRAM(s) Oral at bedtime  ondansetron Injectable 4 milliGRAM(s) IV Push every 6 hours PRN  piperacillin/tazobactam IVPB.. 3.375 Gram(s) IV Intermittent every 8 hours  polyethylene glycol 3350 17 Gram(s) Oral daily  senna 2 Tablet(s) Oral at bedtime  tamsulosin 0.4 milliGRAM(s) Oral at bedtime      I&O's Detail    22 Aug 2023 07:01  -  23 Aug 2023 07:00  --------------------------------------------------------  IN:    Oral Fluid: 390 mL    PRBCs (Packed Red Blood Cells): 279 mL  Total IN: 669 mL    OUT:    Ureteral Catheter (mL): 1025 mL  Total OUT: 1025 mL    Total NET: -356 mL      23 Aug 2023 07:01  -  24 Aug 2023 05:59  --------------------------------------------------------  IN:  Total IN: 0 mL    OUT:    Ureteral Catheter (mL): 820 mL  Total OUT: 820 mL    Total NET: -820 mL          Physical exam:  Pt is aaox3  Pt in no acute distress  Psych: normal affect  Resp: CTAB  CVS: S1S2(+)  ABD: bowel sounds (+), soft, non distended, no rebound, no guarding, no rigidity, no skin changes to exam.   EXT: no calf tenderness or edema to exam b/l, on VTE prophylaxis  Skin: no adverse skin changes to exam      
Patient is a 78y Male who reports no complaints overnight.  eating when fed       MEDICATIONS  (STANDING):  dextrose 5% + sodium chloride 0.45%. 1000 milliLiter(s) (75 mL/Hr) IV Continuous <Continuous>  mirtazapine 7.5 milliGRAM(s) Oral at bedtime  pantoprazole    Tablet 40 milliGRAM(s) Oral before breakfast  piperacillin/tazobactam IVPB.. 3.375 Gram(s) IV Intermittent every 8 hours  polyethylene glycol 3350 17 Gram(s) Oral daily  senna 2 Tablet(s) Oral at bedtime  tamsulosin 0.4 milliGRAM(s) Oral at bedtime    MEDICATIONS  (PRN):  acetaminophen   IVPB .. 1000 milliGRAM(s) IV Intermittent once PRN Temp greater or equal to 38C (100.4F), Mild Pain (1 - 3)  ondansetron Injectable 4 milliGRAM(s) IV Push every 6 hours PRN Nausea        Vital Signs Last 24 Hrs  T(C): 37.2 (24 Aug 2023 07:58), Max: 37.7 (23 Aug 2023 20:45)  T(F): 98.9 (24 Aug 2023 07:58), Max: 99.9 (23 Aug 2023 20:45)  HR: 72 (24 Aug 2023 07:58) (68 - 75)  BP: 139/88 (24 Aug 2023 07:58) (139/88 - 158/76)  BP(mean): --  RR: 18 (24 Aug 2023 07:58) (18 - 18)  SpO2: 98% (24 Aug 2023 07:58) (96% - 98%)    Parameters below as of 24 Aug 2023 07:58  Patient On (Oxygen Delivery Method): room air        I&O's Detail    23 Aug 2023 07:01  -  24 Aug 2023 07:00  --------------------------------------------------------  IN:  Total IN: 0 mL    OUT:    Ureteral Catheter (mL): 1720 mL  Total OUT: 1720 mL    Total NET: -1720 mL        PHYSICAL EXAM:    Constitutional: frail, ill appearing  HEENT:  MM  dist  Cardiovascular: S1 and S2   Extremities: No peripheral edema  Neurological: Alert  : catheter dutton          LABS:                                   10.1   7.75  )-----------( 319      ( 24 Aug 2023 06:34 )             32.8                         8.5    5.75  )-----------( 250      ( 23 Aug 2023 11:26 )             27.8         144    |  119    |  20     ----------------------------<  105       24 Aug 2023 06:34  3.6     |  20     |  2.55     143    |  119    |  25     ----------------------------<  106       23 Aug 2023 07:28  4.0     |  19     |  2.69     147    |  119    |  26     ----------------------------<  107       22 Aug 2023 07:45  3.8     |  20     |  2.85     Ca    8.3        24 Aug 2023 06:34  Ca    7.9        23 Aug 2023 07:28    Phos  3.0       24 Aug 2023 06:34  Phos  3.0       23 Aug 2023 07:28    Mg     1.9       24 Aug 2023 06:34  Mg     2.0       23 Aug 2023 07:28              Urine Studies:  Urinalysis Basic - ( 23 Aug 2023 07:28 )    Color: x / Appearance: x / SG: x / pH: x  Gluc: 106 mg/dL / Ketone: x  / Bili: x / Urobili: x   Blood: x / Protein: x / Nitrite: x   Leuk Esterase: x / RBC: x / WBC x   Sq Epi: x / Non Sq Epi: x / Bacteria: x            RADIOLOGY & ADDITIONAL STUDIES:              
  Patient is a 78M with an unknown baseline ambulatory status (suspected home-ambulator with assistive device and/or assistance, but unconfirmed) who presents to the [facility] ED status-post witnessed fall at his nursing home ([place]) earlier today. Per chart review and ED documentation, patient was being ambulated ot the bathroom when he fell and struck his head without an accompanying loss of consciousness. Patient bears an underlying diagnosis of dementia (A&Ox0) and as such exhibits minimal capacity to participate in a question-directed interview or prompt-based physical examination (patient becomes combative upon provider's attempt to initiate the latter).  Patient unable to confirm or deny any new-onset pain, weakness, numbness, or tingling in the bilateral upper or lower extremities; similarly, patient unable to participate in review of systems. Of note, patient sustained a cervical spine fracture in 2021 treated with an Occiput-C4 Posterior Spinal Fusion (Dr. Dove, '21). Orthopaedic Spine Service contacted for patient evaluation today after CT Cervical Spine demonstrated an "intra-articular lucency in the Right C2 lateral mass favored to represent residual nonunion fracture over recurrent fracture... Mild loosening of the right C2 pedicle screw... and progression of loosening of the suboccipital plate." Sepsis Criteria met in ED and patient admitted to Internal Medicine for further evaluation and management.     8/19/23 Patient awake and confused- name calling but not responsive appropriately to questions    PAST MEDICAL & SURGICAL HISTORY:  Anemia      Depression      BPH (benign prostatic hyperplasia)      Dementia      No significant past surgical history      MEDICATIONS  (STANDING):  mirtazapine 7.5 milliGRAM(s) Oral at bedtime  piperacillin/tazobactam IVPB.. 3.375 Gram(s) IV Intermittent every 8 hours  polyethylene glycol 3350 17 Gram(s) Oral daily  senna 2 Tablet(s) Oral at bedtime  sodium chloride 0.9%. 1000 milliLiter(s) (150 mL/Hr) IV Continuous <Continuous>  tamsulosin 0.4 milliGRAM(s) Oral at bedtime    MEDICATIONS  (PRN):  acetaminophen   IVPB .. 1000 milliGRAM(s) IV Intermittent once PRN Temp greater or equal to 38C (100.4F), Mild Pain (1 - 3)  ketorolac   Injectable 15 milliGRAM(s) IV Push once PRN Moderate Pain (4 - 6)  ondansetron Injectable 4 milliGRAM(s) IV Push every 6 hours PRN Nausea    Allergies    No Known Allergies    Intolerances    PE:    ICU Vital Signs Last 24 Hrs  T(C): 36.8 (19 Aug 2023 09:39), Max: 38.4 (18 Aug 2023 20:53)  T(F): 98.3 (19 Aug 2023 09:39), Max: 101.1 (18 Aug 2023 20:53)  HR: 95 (19 Aug 2023 11:00) (65 - 104)  BP: 116/98 (19 Aug 2023 10:00) (91/49 - 131/77)  BP(mean): 106 (19 Aug 2023 10:00) (62 - 106)  ABP: --  ABP(mean): --  RR: 36 (19 Aug 2023 11:00) (10 - 36)  SpO2: 100% (19 Aug 2023 11:00) (90% - 100%)    O2 Parameters below as of 19 Aug 2023 10:00  Patient On (Oxygen Delivery Method): room air    Patient awake but not responding to commands  Hard collar in place-removed. Unable to assess if he has tenderness  Moving all extremites spontaneously but not able to assess strength  Negative Leela's/clonus/hyperreflexia    STUDIES  CT head/neck    Reversal of the cervical lordosis, new compared to the prior. Stigmata of   prior fracture at the base of the odontoid process. Residual partial   nonunion fracture versus recurrent fracture involving the right C2   lateral mass, seen on axial series 605, images 33-35. Craniocervical   alignment is maintained. Redemonstrated surgical fusion from the   posterior occiput to C4. Metallic hardware is intact. 4 mm posterior   separation of the left aspect of the suboccipital plate is new compared   to the prior. Stable posterior separation of the right aspect of the   suboccipital plate. New lucency surrounds the right C2 pedicle screw. No   acute compression fracture or prevertebral edema.    No gross upper cervical canal hematoma or mass.  Cervical soft tissues are unremarkable.    
Patient is a 78y old  Male who presents with a chief complaint of Fall (20 Aug 2023 13:29)    24 hour events:     Allergies    No Known Allergies    Intolerances      REVIEW OF SYSTEMS: SEE BELOW       ICU Vital Signs Last 24 Hrs  T(C): 36.3 (20 Aug 2023 13:58), Max: 38.3 (19 Aug 2023 16:57)  T(F): 97.4 (20 Aug 2023 13:58), Max: 100.9 (19 Aug 2023 16:57)  HR: 64 (20 Aug 2023 15:00) (46 - 99)  BP: 150/69 (20 Aug 2023 13:00) (123/79 - 161/93)  BP(mean): 92 (20 Aug 2023 13:00) (69 - 107)  ABP: --  ABP(mean): --  RR: 18 (20 Aug 2023 12:00) (13 - 25)  SpO2: 96% (20 Aug 2023 12:00) (95% - 99%)    O2 Parameters below as of 20 Aug 2023 12:00  Patient On (Oxygen Delivery Method): room air            CAPILLARY BLOOD GLUCOSE          I&O's Summary    19 Aug 2023 07:01  -  20 Aug 2023 07:00  --------------------------------------------------------  IN: 2000 mL / OUT: 1425 mL / NET: 575 mL            MEDICATIONS  (STANDING):  dextrose 5% + sodium chloride 0.45%. 1000 milliLiter(s) (75 mL/Hr) IV Continuous <Continuous>  mirtazapine 7.5 milliGRAM(s) Oral at bedtime  piperacillin/tazobactam IVPB.. 3.375 Gram(s) IV Intermittent every 8 hours  polyethylene glycol 3350 17 Gram(s) Oral daily  senna 2 Tablet(s) Oral at bedtime  tamsulosin 0.4 milliGRAM(s) Oral at bedtime      MEDICATIONS  (PRN):  acetaminophen   IVPB .. 1000 milliGRAM(s) IV Intermittent once PRN Temp greater or equal to 38C (100.4F), Mild Pain (1 - 3)  ketorolac   Injectable 15 milliGRAM(s) IV Push once PRN Moderate Pain (4 - 6)  ondansetron Injectable 4 milliGRAM(s) IV Push every 6 hours PRN Nausea      PHYSICAL EXAM: SEE BELOW                          8.0    9.37  )-----------( 203      ( 20 Aug 2023 05:23 )             26.4       08-20    147<H>  |  119<H>  |  35<H>  ----------------------------<  84  4.2   |  20<L>  |  2.82<H>    Ca    8.1<L>      20 Aug 2023 05:23  Phos  3.8     08-20  Mg     2.2     08-20    TPro  7.7  /  Alb  2.9<L>  /  TBili  0.5  /  DBili  x   /  AST  28  /  ALT  13  /  AlkPhos  101  08-18      CARDIAC MARKERS ( 20 Aug 2023 05:23 )  x     / x     / 740 U/L / x     / x          PT/INR - ( 18 Aug 2023 21:54 )   PT: 13.7 sec;   INR: 1.22 ratio         PTT - ( 18 Aug 2023 21:54 )  PTT:30.4 sec  Urinalysis Basic - ( 20 Aug 2023 05:23 )    Color: x / Appearance: x / SG: x / pH: x  Gluc: 84 mg/dL / Ketone: x  / Bili: x / Urobili: x   Blood: x / Protein: x / Nitrite: x   Leuk Esterase: x / RBC: x / WBC x   Sq Epi: x / Non Sq Epi: x / Bacteria: x      .Blood None   No growth at 24 hours -- 08-18 @ 21:54      Rapid RVP Result: NotDetec (08-18 @ 21:54)      
  CC:    HPI:  79 y/o M with a PMHx of dementia, BPH, depression, and anemia presents to the ED with C-collar s/p fall. the patient is from [place] and was being ambulated to the bathroom when he fell to the floor and struck his head. No LOC. as per aid.    8/21: No events besides confusion          Vital Signs Last 24 Hrs  T(C): 36.8 (19 Aug 2023 01:52), Max: 38.4 (18 Aug 2023 20:53)  T(F): 98.3 (19 Aug 2023 01:52), Max: 101.1 (18 Aug 2023 20:53)  HR: 75 (19 Aug 2023 00:50) (75 - 103)  BP: 92/55 (19 Aug 2023 00:50) (92/55 - 124/57)  BP(mean): 66 (19 Aug 2023 00:50) (66 - 66)  RR: 15 (19 Aug 2023 00:50) (15 - 18)  SpO2: 100% (19 Aug 2023 00:50) (97% - 100%)    Parameters below as of 19 Aug 2023 00:50  Patient On (Oxygen Delivery Method): room air    Meds:  acetaminophen   IVPB .. 1000 milliGRAM(s) IV Intermittent once PRN  ketorolac   Injectable 15 milliGRAM(s) IV Push once PRN  lactated ringers. 1000 milliLiter(s) IV Continuous <Continuous>  mirtazapine 7.5 milliGRAM(s) Oral at bedtime  ondansetron Injectable 4 milliGRAM(s) IV Push every 6 hours PRN  piperacillin/tazobactam IVPB.. 3.375 Gram(s) IV Intermittent every 8 hours  polyethylene glycol 3350 17 Gram(s) Oral daily  senna 2 Tablet(s) Oral at bedtime  tamsulosin 0.4 milliGRAM(s) Oral at bedtime       (19 Aug 2023 01:50)       PAST MEDICAL & SURGICAL HISTORY:  Anemia      Depression      BPH (benign prostatic hyperplasia)      Dementia      H/O cervical spine surgery          FAMILY HISTORY:  No pertinent family history in first degree relatives        Social Hx:    Allergies    No Known Allergies    Intolerances            ICU Vital Signs Last 24 Hrs  T(C): 36.9 (21 Aug 2023 14:05), Max: 36.9 (21 Aug 2023 14:05)  T(F): 98.4 (21 Aug 2023 14:05), Max: 98.4 (21 Aug 2023 14:05)  HR: 85 (21 Aug 2023 16:00) (48 - 94)  BP: 158/97 (21 Aug 2023 16:00) (101/58 - 173/84)  BP(mean): 112 (21 Aug 2023 16:00) (72 - 144)  ABP: --  ABP(mean): --  RR: 19 (21 Aug 2023 16:00) (11 - 26)  SpO2: 98% (21 Aug 2023 14:00) (95% - 98%)    O2 Parameters below as of 20 Aug 2023 22:00  Patient On (Oxygen Delivery Method): room air                I&O's Summary    20 Aug 2023 07:01  -  21 Aug 2023 07:00  --------------------------------------------------------  IN: 0 mL / OUT: 750 mL / NET: -750 mL                              8.7    7.76  )-----------( 243      ( 21 Aug 2023 05:38 )             28.5       08-21    145  |  120<H>  |  35<H>  ----------------------------<  103<H>  3.8   |  19<L>  |  2.88<H>    Ca    8.0<L>      21 Aug 2023 05:38  Phos  3.4     08-21  Mg     2.2     08-21        CARDIAC MARKERS ( 20 Aug 2023 05:23 )  x     / x     / 740 U/L / x     / x                Urinalysis Basic - ( 21 Aug 2023 05:38 )    Color: x / Appearance: x / SG: x / pH: x  Gluc: 103 mg/dL / Ketone: x  / Bili: x / Urobili: x   Blood: x / Protein: x / Nitrite: x   Leuk Esterase: x / RBC: x / WBC x   Sq Epi: x / Non Sq Epi: x / Bacteria: x        MEDICATIONS  (STANDING):  dextrose 5% + sodium chloride 0.45%. 1000 milliLiter(s) (75 mL/Hr) IV Continuous <Continuous>  mirtazapine 7.5 milliGRAM(s) Oral at bedtime  piperacillin/tazobactam IVPB.. 3.375 Gram(s) IV Intermittent every 8 hours  polyethylene glycol 3350 17 Gram(s) Oral daily  senna 2 Tablet(s) Oral at bedtime  tamsulosin 0.4 milliGRAM(s) Oral at bedtime    MEDICATIONS  (PRN):  acetaminophen   IVPB .. 1000 milliGRAM(s) IV Intermittent once PRN Temp greater or equal to 38C (100.4F), Mild Pain (1 - 3)  ondansetron Injectable 4 milliGRAM(s) IV Push every 6 hours PRN Nausea      DVT Prophylaxis: V    Advanced Directives:  Discussed with:    Visit Information:    ** Time is exclusive of billed procedures and/or teaching and/or routine family updates.        
Patient is a 78y old  Male who presents with a chief complaint of Fall (19 Aug 2023 11:27)    24 hour events:     Allergies    No Known Allergies    Intolerances      REVIEW OF SYSTEMS: SEE BELOW       ICU Vital Signs Last 24 Hrs  T(C): 36.8 (19 Aug 2023 09:39), Max: 38.4 (18 Aug 2023 20:53)  T(F): 98.3 (19 Aug 2023 09:39), Max: 101.1 (18 Aug 2023 20:53)  HR: 86 (19 Aug 2023 13:00) (65 - 104)  BP: 116/98 (19 Aug 2023 10:00) (91/49 - 131/77)  BP(mean): 106 (19 Aug 2023 10:00) (62 - 106)  ABP: --  ABP(mean): --  RR: 23 (19 Aug 2023 13:00) (10 - 36)  SpO2: 97% (19 Aug 2023 13:00) (90% - 100%)    O2 Parameters below as of 19 Aug 2023 10:00  Patient On (Oxygen Delivery Method): room air            CAPILLARY BLOOD GLUCOSE          I&O's Summary          MEDICATIONS  (STANDING):  mirtazapine 7.5 milliGRAM(s) Oral at bedtime  piperacillin/tazobactam IVPB.. 3.375 Gram(s) IV Intermittent every 8 hours  polyethylene glycol 3350 17 Gram(s) Oral daily  senna 2 Tablet(s) Oral at bedtime  sodium chloride 0.9%. 1000 milliLiter(s) (150 mL/Hr) IV Continuous <Continuous>  tamsulosin 0.4 milliGRAM(s) Oral at bedtime      MEDICATIONS  (PRN):  acetaminophen   IVPB .. 1000 milliGRAM(s) IV Intermittent once PRN Temp greater or equal to 38C (100.4F), Mild Pain (1 - 3)  ketorolac   Injectable 15 milliGRAM(s) IV Push once PRN Moderate Pain (4 - 6)  ondansetron Injectable 4 milliGRAM(s) IV Push every 6 hours PRN Nausea      PHYSICAL EXAM: SEE BELOW                          7.8    8.82  )-----------( 199      ( 19 Aug 2023 05:41 )             25.3       08-19    143  |  117<H>  |  36<H>  ----------------------------<  101<H>  3.9   |  21<L>  |  2.51<H>    Ca    7.9<L>      19 Aug 2023 05:41  Phos  3.2     08-19  Mg     2.0     08-19    TPro  7.7  /  Alb  2.9<L>  /  TBili  0.5  /  DBili  x   /  AST  28  /  ALT  13  /  AlkPhos  101  08-18    Lactate 0.9           08-19 @ 01:10    Lactate 2.3           08-18 @ 21:54          PT/INR - ( 18 Aug 2023 21:54 )   PT: 13.7 sec;   INR: 1.22 ratio         PTT - ( 18 Aug 2023 21:54 )  PTT:30.4 sec  Urinalysis Basic - ( 19 Aug 2023 05:41 )    Color: x / Appearance: x / SG: x / pH: x  Gluc: 101 mg/dL / Ketone: x  / Bili: x / Urobili: x   Blood: x / Protein: x / Nitrite: x   Leuk Esterase: x / RBC: x / WBC x   Sq Epi: x / Non Sq Epi: x / Bacteria: x          Rapid RVP Result: NotDetec (08-18 @ 21:54)

## 2023-08-24 NOTE — PROGRESS NOTE ADULT - PROVIDER SPECIALTY LIST ADULT
Critical Care
Nephrology
Orthopedics
Trauma Surgery
Orthopedics
Trauma Surgery
Critical Care
Nephrology
Trauma Surgery
Orthopedics
Critical Care

## 2023-08-24 NOTE — PROGRESS NOTE ADULT - ASSESSMENT
A/P:  79 yo M s/p fall  C2 non union, chronic  Anemia  UTI on iv antibiotics  Loose pedical screws from prior cervical surgery  doing well  h/h stable post 1 uPRBC    P:  cont diet  pain control.   Spine surgery on consult and mgmt for above cervical pathology, cleared from spine surgical standpoint, no cervical collar   Dementia  GI prophylaxis  Pain control  F/U labs if Hg stable DC as per GI  cont home meds  PT consult REID FLAHERTY for dispo planning            A/P:  79 yo M s/p fall  C2 non union, chronic  Anemia- chronic disease  UTI on iv antibiotics  Loose pedical screws from prior cervical surgery  doing well  h/h stable post 1 uPRBC    P:  cont diet  pain control.   Spine surgery on consult and mgmt for above cervical pathology, cleared from spine surgical standpoint, no cervical collar   Dementia  GI prophylaxis  Pain control  F/U labs if Hg stable DC as per GI  cont home meds  PT consult RE[facility] FLAHERTY for dispo planning

## 2023-08-24 NOTE — PROGRESS NOTE ADULT - ASSESSMENT
78 with apparent CKD III B with baseline near 1.5 mg/dl dementia, BPH, depression, and anemia presents to the ED with C-collar s/p fall. the patient is from [place] and was being ambulated to the bathroom when he fell to the floor and struck his head per documents. Patient was seen by surgical team and in ICU. Renal for ALEKSANDR on CKD    ALEKSANDR on CKD III  -Renal function stable, downtrending  -Keep net positive, w hypotonic IVF as pt not eating - needs to encourage po fluid intake  -No nsaids/contrast  - would continue IVF x 24 more hours and trend labs   - if DC to SNF will need to check labs in 2-3 days as Cr remains above baseline      Anemia s/p PRBC   -Per medical/surgical teams     d/w RN staff

## 2023-08-25 VITALS
SYSTOLIC BLOOD PRESSURE: 143 MMHG | RESPIRATION RATE: 18 BRPM | TEMPERATURE: 99 F | DIASTOLIC BLOOD PRESSURE: 75 MMHG | HEART RATE: 76 BPM | OXYGEN SATURATION: 99 %

## 2023-08-25 LAB
ANION GAP SERPL CALC-SCNC: 5 MMOL/L — SIGNIFICANT CHANGE UP (ref 5–17)
BUN SERPL-MCNC: 16 MG/DL — SIGNIFICANT CHANGE UP (ref 7–23)
CALCIUM SERPL-MCNC: 7.9 MG/DL — LOW (ref 8.5–10.1)
CHLORIDE SERPL-SCNC: 120 MMOL/L — HIGH (ref 96–108)
CO2 SERPL-SCNC: 21 MMOL/L — LOW (ref 22–31)
CREAT SERPL-MCNC: 2.41 MG/DL — HIGH (ref 0.5–1.3)
EGFR: 27 ML/MIN/1.73M2 — LOW
GLUCOSE SERPL-MCNC: 106 MG/DL — HIGH (ref 70–99)
POTASSIUM SERPL-MCNC: 3.3 MMOL/L — LOW (ref 3.5–5.3)
POTASSIUM SERPL-SCNC: 3.3 MMOL/L — LOW (ref 3.5–5.3)
SODIUM SERPL-SCNC: 146 MMOL/L — HIGH (ref 135–145)

## 2023-08-25 RX ORDER — POTASSIUM CHLORIDE 20 MEQ
1 PACKET (EA) ORAL
Qty: 1 | Refills: 0
Start: 2023-08-25 | End: 2023-08-25

## 2023-08-25 RX ADMIN — PIPERACILLIN AND TAZOBACTAM 25 GRAM(S): 4; .5 INJECTION, POWDER, LYOPHILIZED, FOR SOLUTION INTRAVENOUS at 05:27

## 2023-08-30 NOTE — CDI QUERY NOTE - NSCDIOTHERTXTBX_GEN_ALL_CORE_HH
Patient is documented by Critical Care and flow sheets with cath induced UTI with Sepsis POA. Could you please further clarify in the DC summary if you are in agreement with the flow sheets and Critical Care documentation    -Catheter induced UTI with Sepsis  -Sepsis not associated with Catheter induced UTI  -Other please specify    Chart documentation    -DC summary-and labs showed he had a UTI. He was treated for UTi with IV antibiotics and -discharge diagnosis-Diagnosis: Sepsis due to urinary tract infection  -Surgery notes documents on 8/22, 8/21 -C2 non union, chronic Anemia UTI on iv antibiotics-C2 non union, chronicUTI on iv antibioticsSpine surgery on consult and mgmt for above cervical pathology, cleared from spine surgical standpoint, no cervical collar needed  -Critical Care  documents 8/21-79 y/o male from [facility] dementia, BPH, urinary retention with chronic Lorenzo cath, presented after a fall -Also has cath induced UTI with sepsis (POA), prerenal ALEKSANDR and acute metabolic encephalopathy -Lorenzo changed in ED -Found to have suspected C2 fracture with mild loosening of prior hardware screw Also has cath induced UTI with sepsis (POA)  -ED- documents -Secondary Diagnosis: Sepsis due to urinary tract infection.  -Flow sheets 8/19-catheter from outpatient  -Babatunde Guevara)   (Signed 21-Aug-2023 08:06)Sepsis Criteria met in ED and patient admitted to Internal Medicine for further evaluation and management. Patient is documented by Critical Care and flow sheets with cath induced UTI with Sepsis POA. Could you please further clarify in the DC summary if you are in agreement with the flow sheets and Critical Care documentation    -Catheter induced UTI with Sepsis  -Sepsis not associated with Catheter induced UTI  -Other please specify    Chart documentation    -DC summary-and labs showed he had a UTI. He was treated for UTi with IV antibiotics and -discharge diagnosis- Sepsis due to urinary tract infection  -Surgery notes documents on 8/22, 8/21 -UTI on iv antibiotics-C2 non union, chronic UTI on iv antibiotics   -Critical Care  documents 8/21, 8/20, 8/19 - BPH, urinary retention with chronic Lorenzo cath, presented after a fall -Also has cath induced UTI with sepsis (POA), prerenal ALEKSANDR and acute metabolic encephalopathy -Lorenzo changed in ED - Also has cath induced UTI with sepsis (POA)  -ED- documents -Secondary Diagnosis: Sepsis due to urinary tract infection.  -Flow sheets 8/19-catheter from outpatient  -Ortho 21-Aug-2023 08:06)Sepsis Criteria met in ED and patient admitted to Internal Medicine for further evaluation and management.

## 2023-09-05 ENCOUNTER — INPATIENT (INPATIENT)
Facility: HOSPITAL | Age: 78
LOS: 7 days | Discharge: HOSPICE | DRG: 698 | End: 2023-09-13
Attending: INTERNAL MEDICINE | Admitting: INTERNAL MEDICINE
Payer: MEDICARE

## 2023-09-05 VITALS
HEIGHT: 69 IN | WEIGHT: 149.91 LBS | DIASTOLIC BLOOD PRESSURE: 75 MMHG | RESPIRATION RATE: 17 BRPM | SYSTOLIC BLOOD PRESSURE: 113 MMHG | HEART RATE: 87 BPM | TEMPERATURE: 97 F | OXYGEN SATURATION: 100 %

## 2023-09-05 DIAGNOSIS — T83.511A INFECTION AND INFLAMMATORY REACTION DUE TO INDWELLING URETHRAL CATHETER, INITIAL ENCOUNTER: ICD-10-CM

## 2023-09-05 DIAGNOSIS — E86.0 DEHYDRATION: ICD-10-CM

## 2023-09-05 DIAGNOSIS — A41.9 SEPSIS, UNSPECIFIED ORGANISM: ICD-10-CM

## 2023-09-05 DIAGNOSIS — E87.20 ACIDOSIS, UNSPECIFIED: ICD-10-CM

## 2023-09-05 DIAGNOSIS — D63.8 ANEMIA IN OTHER CHRONIC DISEASES CLASSIFIED ELSEWHERE: ICD-10-CM

## 2023-09-05 DIAGNOSIS — G93.41 METABOLIC ENCEPHALOPATHY: ICD-10-CM

## 2023-09-05 DIAGNOSIS — N18.30 CHRONIC KIDNEY DISEASE, STAGE 3 UNSPECIFIED: ICD-10-CM

## 2023-09-05 DIAGNOSIS — M84.48XA PATHOLOGICAL FRACTURE, OTHER SITE, INITIAL ENCOUNTER FOR FRACTURE: ICD-10-CM

## 2023-09-05 DIAGNOSIS — K92.1 MELENA: ICD-10-CM

## 2023-09-05 DIAGNOSIS — Z20.822 CONTACT WITH AND (SUSPECTED) EXPOSURE TO COVID-19: ICD-10-CM

## 2023-09-05 DIAGNOSIS — N39.0 URINARY TRACT INFECTION, SITE NOT SPECIFIED: ICD-10-CM

## 2023-09-05 DIAGNOSIS — F03.C11 UNSPECIFIED DEMENTIA, SEVERE, WITH AGITATION: ICD-10-CM

## 2023-09-05 DIAGNOSIS — S12.100A UNSPECIFIED DISPLACED FRACTURE OF SECOND CERVICAL VERTEBRA, INITIAL ENCOUNTER FOR CLOSED FRACTURE: ICD-10-CM

## 2023-09-05 DIAGNOSIS — N40.0 BENIGN PROSTATIC HYPERPLASIA WITHOUT LOWER URINARY TRACT SYMPTOMS: ICD-10-CM

## 2023-09-05 DIAGNOSIS — N17.9 ACUTE KIDNEY FAILURE, UNSPECIFIED: ICD-10-CM

## 2023-09-05 DIAGNOSIS — M96.1 POSTLAMINECTOMY SYNDROME, NOT ELSEWHERE CLASSIFIED: ICD-10-CM
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
## 2023-09-05 DIAGNOSIS — E43 UNSPECIFIED SEVERE PROTEIN-CALORIE MALNUTRITION: ICD-10-CM
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
## 2023-09-05 DIAGNOSIS — T84.296A OTHER MECHANICAL COMPLICATION OF INTERNAL FIXATION DEVICE OF VERTEBRAE, INITIAL ENCOUNTER: ICD-10-CM

## 2023-09-05 DIAGNOSIS — Z98.890 OTHER SPECIFIED POSTPROCEDURAL STATES: Chronic | ICD-10-CM

## 2023-09-05 DIAGNOSIS — E11.22 TYPE 2 DIABETES MELLITUS WITH DIABETIC CHRONIC KIDNEY DISEASE: ICD-10-CM

## 2023-09-05 LAB
ALBUMIN SERPL ELPH-MCNC: 2.5 G/DL — LOW (ref 3.3–5)
ALP SERPL-CCNC: 93 U/L — SIGNIFICANT CHANGE UP (ref 40–120)
ALT FLD-CCNC: 35 U/L — SIGNIFICANT CHANGE UP (ref 12–78)
ANION GAP SERPL CALC-SCNC: 6 MMOL/L — SIGNIFICANT CHANGE UP (ref 5–17)
APPEARANCE UR: ABNORMAL
APTT BLD: 29.5 SEC — SIGNIFICANT CHANGE UP (ref 24.5–35.6)
AST SERPL-CCNC: 55 U/L — HIGH (ref 15–37)
BACTERIA # UR AUTO: ABNORMAL
BASOPHILS # BLD AUTO: 0.08 K/UL — SIGNIFICANT CHANGE UP (ref 0–0.2)
BASOPHILS NFR BLD AUTO: 0.5 % — SIGNIFICANT CHANGE UP (ref 0–2)
BILIRUB SERPL-MCNC: 0.4 MG/DL — SIGNIFICANT CHANGE UP (ref 0.2–1.2)
BILIRUB UR-MCNC: NEGATIVE — SIGNIFICANT CHANGE UP
BUN SERPL-MCNC: 34 MG/DL — HIGH (ref 7–23)
CALCIUM SERPL-MCNC: 9.1 MG/DL — SIGNIFICANT CHANGE UP (ref 8.5–10.1)
CHLORIDE SERPL-SCNC: 115 MMOL/L — HIGH (ref 96–108)
CO2 SERPL-SCNC: 25 MMOL/L — SIGNIFICANT CHANGE UP (ref 22–31)
COLOR SPEC: YELLOW — SIGNIFICANT CHANGE UP
CREAT SERPL-MCNC: 2.34 MG/DL — HIGH (ref 0.5–1.3)
DIFF PNL FLD: ABNORMAL
EGFR: 28 ML/MIN/1.73M2 — LOW
EOSINOPHIL # BLD AUTO: 0.13 K/UL — SIGNIFICANT CHANGE UP (ref 0–0.5)
EOSINOPHIL NFR BLD AUTO: 0.9 % — SIGNIFICANT CHANGE UP (ref 0–6)
EPI CELLS # UR: NEGATIVE — SIGNIFICANT CHANGE UP
GLUCOSE SERPL-MCNC: 100 MG/DL — HIGH (ref 70–99)
GLUCOSE UR QL: NEGATIVE — SIGNIFICANT CHANGE UP
HCT VFR BLD CALC: 37.5 % — LOW (ref 39–50)
HGB BLD-MCNC: 11.1 G/DL — LOW (ref 13–17)
HYALINE CASTS # UR AUTO: NEGATIVE /LPF — SIGNIFICANT CHANGE UP
IMM GRANULOCYTES NFR BLD AUTO: 0.3 % — SIGNIFICANT CHANGE UP (ref 0–0.9)
INR BLD: 1.1 RATIO — SIGNIFICANT CHANGE UP (ref 0.85–1.18)
KETONES UR-MCNC: NEGATIVE — SIGNIFICANT CHANGE UP
LACTATE SERPL-SCNC: 1.7 MMOL/L — SIGNIFICANT CHANGE UP (ref 0.7–2)
LEUKOCYTE ESTERASE UR-ACNC: ABNORMAL
LIDOCAIN IGE QN: 35 U/L — SIGNIFICANT CHANGE UP (ref 13–75)
LYMPHOCYTES # BLD AUTO: 14.6 % — SIGNIFICANT CHANGE UP (ref 13–44)
LYMPHOCYTES # BLD AUTO: 2.14 K/UL — SIGNIFICANT CHANGE UP (ref 1–3.3)
MAGNESIUM SERPL-MCNC: 2.4 MG/DL — SIGNIFICANT CHANGE UP (ref 1.6–2.6)
MCHC RBC-ENTMCNC: 25.7 PG — LOW (ref 27–34)
MCHC RBC-ENTMCNC: 29.6 GM/DL — LOW (ref 32–36)
MCV RBC AUTO: 86.8 FL — SIGNIFICANT CHANGE UP (ref 80–100)
MONOCYTES # BLD AUTO: 1.17 K/UL — HIGH (ref 0–0.9)
MONOCYTES NFR BLD AUTO: 8 % — SIGNIFICANT CHANGE UP (ref 2–14)
NEUTROPHILS # BLD AUTO: 11.08 K/UL — HIGH (ref 1.8–7.4)
NEUTROPHILS NFR BLD AUTO: 75.7 % — SIGNIFICANT CHANGE UP (ref 43–77)
NITRITE UR-MCNC: POSITIVE
PH UR: 5 — SIGNIFICANT CHANGE UP (ref 5–8)
PLATELET # BLD AUTO: 501 K/UL — HIGH (ref 150–400)
POTASSIUM SERPL-MCNC: 4.5 MMOL/L — SIGNIFICANT CHANGE UP (ref 3.5–5.3)
POTASSIUM SERPL-SCNC: 4.5 MMOL/L — SIGNIFICANT CHANGE UP (ref 3.5–5.3)
PROT SERPL-MCNC: 7.9 GM/DL — SIGNIFICANT CHANGE UP (ref 6–8.3)
PROT UR-MCNC: 100
PROTHROM AB SERPL-ACNC: 12.4 SEC — SIGNIFICANT CHANGE UP (ref 9.5–13)
RBC # BLD: 4.32 M/UL — SIGNIFICANT CHANGE UP (ref 4.2–5.8)
RBC # FLD: 17.3 % — HIGH (ref 10.3–14.5)
RBC CASTS # UR COMP ASSIST: NEGATIVE /HPF — SIGNIFICANT CHANGE UP (ref 0–4)
SODIUM SERPL-SCNC: 146 MMOL/L — HIGH (ref 135–145)
SP GR SPEC: 1.02 — SIGNIFICANT CHANGE UP (ref 1.01–1.02)
UROBILINOGEN FLD QL: NEGATIVE — SIGNIFICANT CHANGE UP
WBC # BLD: 14.65 K/UL — HIGH (ref 3.8–10.5)
WBC # FLD AUTO: 14.65 K/UL — HIGH (ref 3.8–10.5)
WBC UR QL: >50 /HPF (ref 0–5)

## 2023-09-05 PROCEDURE — 82140 ASSAY OF AMMONIA: CPT

## 2023-09-05 PROCEDURE — 87040 BLOOD CULTURE FOR BACTERIA: CPT

## 2023-09-05 PROCEDURE — 36415 COLL VENOUS BLD VENIPUNCTURE: CPT

## 2023-09-05 PROCEDURE — 99285 EMERGENCY DEPT VISIT HI MDM: CPT

## 2023-09-05 PROCEDURE — 92523 SPEECH SOUND LANG COMPREHEN: CPT | Mod: GN

## 2023-09-05 PROCEDURE — 80053 COMPREHEN METABOLIC PANEL: CPT

## 2023-09-05 PROCEDURE — 92507 TX SP LANG VOICE COMM INDIV: CPT | Mod: GN

## 2023-09-05 PROCEDURE — 85610 PROTHROMBIN TIME: CPT

## 2023-09-05 PROCEDURE — 92610 EVALUATE SWALLOWING FUNCTION: CPT | Mod: GN

## 2023-09-05 PROCEDURE — 83605 ASSAY OF LACTIC ACID: CPT

## 2023-09-05 PROCEDURE — 85025 COMPLETE CBC W/AUTO DIFF WBC: CPT

## 2023-09-05 PROCEDURE — 80048 BASIC METABOLIC PNL TOTAL CA: CPT

## 2023-09-05 PROCEDURE — 92526 ORAL FUNCTION THERAPY: CPT | Mod: GN

## 2023-09-05 PROCEDURE — 85730 THROMBOPLASTIN TIME PARTIAL: CPT

## 2023-09-05 PROCEDURE — 85027 COMPLETE CBC AUTOMATED: CPT

## 2023-09-05 PROCEDURE — 74176 CT ABD & PELVIS W/O CONTRAST: CPT

## 2023-09-05 RX ORDER — SODIUM CHLORIDE 9 MG/ML
1000 INJECTION INTRAMUSCULAR; INTRAVENOUS; SUBCUTANEOUS ONCE
Refills: 0 | Status: COMPLETED | OUTPATIENT
Start: 2023-09-05 | End: 2023-09-05

## 2023-09-05 RX ORDER — CEFTRIAXONE 500 MG/1
1000 INJECTION, POWDER, FOR SOLUTION INTRAMUSCULAR; INTRAVENOUS ONCE
Refills: 0 | Status: COMPLETED | OUTPATIENT
Start: 2023-09-05 | End: 2023-09-05

## 2023-09-05 RX ORDER — PREGABALIN 225 MG/1
1 CAPSULE ORAL
Refills: 0 | DISCHARGE

## 2023-09-05 RX ORDER — TAMSULOSIN HYDROCHLORIDE 0.4 MG/1
1 CAPSULE ORAL
Refills: 0 | DISCHARGE

## 2023-09-05 RX ORDER — SODIUM CHLORIDE 9 MG/ML
3 INJECTION INTRAMUSCULAR; INTRAVENOUS; SUBCUTANEOUS ONCE
Refills: 0 | Status: COMPLETED | OUTPATIENT
Start: 2023-09-05 | End: 2023-09-05

## 2023-09-05 RX ORDER — POLYETHYLENE GLYCOL 3350 17 G/17G
17 POWDER, FOR SOLUTION ORAL
Refills: 0 | DISCHARGE

## 2023-09-05 RX ORDER — MULTIVIT-MIN/FERROUS GLUCONATE 9 MG/15 ML
1 LIQUID (ML) ORAL
Refills: 0 | DISCHARGE

## 2023-09-05 RX ADMIN — CEFTRIAXONE 1000 MILLIGRAM(S): 500 INJECTION, POWDER, FOR SOLUTION INTRAMUSCULAR; INTRAVENOUS at 17:15

## 2023-09-05 RX ADMIN — SODIUM CHLORIDE 1000 MILLILITER(S): 9 INJECTION INTRAMUSCULAR; INTRAVENOUS; SUBCUTANEOUS at 16:30

## 2023-09-05 RX ADMIN — SODIUM CHLORIDE 3 MILLILITER(S): 9 INJECTION INTRAMUSCULAR; INTRAVENOUS; SUBCUTANEOUS at 15:51

## 2023-09-05 NOTE — ED ADULT TRIAGE NOTE - CHIEF COMPLAINT QUOTE
Patient comes in with decreased PO intake and constipation x4 days. Patient is from [facility]. Denies additional complaints.

## 2023-09-05 NOTE — ED PROVIDER NOTE - CLINICAL SUMMARY MEDICAL DECISION MAKING FREE TEXT BOX
Pt with hx depression anxiety demential here with not eating or drinking and no bm for four days. Labs, xray ekg fluids.

## 2023-09-05 NOTE — ED PROVIDER NOTE - OBJECTIVE STATEMENT
pt is a 77 yo wm with hx hyperosmolarity, hypernatremia, depression/anxiety and psychosis, resident of [facility], sent for failure to thrive not eating or drinking and no bm for last four days. Pt was admitted to  8-19 to 8-24. Pt is DNR. Pt is nonverbal at this time and unable to give hx.

## 2023-09-05 NOTE — ED ADULT NURSE NOTE - OBJECTIVE STATEMENT
Pt nonverbal, presents to the ED from [place] for decreased PO intake and constipation x4 days. Pt contracted in stretcher. 16FR chronic dutton in place. No distress noted.

## 2023-09-05 NOTE — PHARMACOTHERAPY INTERVENTION NOTE - COMMENTS
Medication reconciliation completed.  Reviewed Medication list and confirmed med allergies with list from Care Facility "[facility]"; confirmed with Dr. First MedHx.

## 2023-09-05 NOTE — ED ADULT NURSE NOTE - NSFALLHARMRISKINTERV_ED_ALL_ED
Assistance OOB with selected safe patient handling equipment if applicable/Assistance with ambulation/Communicate risk of Fall with Harm to all staff, patient, and family/Monitor gait and stability/Provide visual cue: red socks, yellow wristband, yellow gown, etc/Reinforce activity limits and safety measures with patient and family/Bed in lowest position, wheels locked, appropriate side rails in place/Call bell, personal items and telephone in reach/Instruct patient to call for assistance before getting out of bed/chair/stretcher/Non-slip footwear applied when patient is off stretcher/[place] to call system/Physically safe environment - no spills, clutter or unnecessary equipment/Purposeful Proactive Rounding/Room/bathroom lighting operational, light cord in reach

## 2023-09-05 NOTE — ED PROVIDER NOTE - PROGRESS NOTE DETAILS
Alfonso Colby for attending Dr. White: Rectal Exam Lot [id number]. Expiration 4/30/24. Yellow stool. Guaiac negative. QC passed.

## 2023-09-05 NOTE — ED ADULT NURSE NOTE - CHIEF COMPLAINT QUOTE
Patient comes in with decreased PO intake and constipation x4 days. Patient is from [facility]. Denies additional complaints.

## 2023-09-06 LAB
ALBUMIN SERPL ELPH-MCNC: 2.1 G/DL — LOW (ref 3.3–5)
ALP SERPL-CCNC: 84 U/L — SIGNIFICANT CHANGE UP (ref 40–120)
ALT FLD-CCNC: 28 U/L — SIGNIFICANT CHANGE UP (ref 12–78)
ANION GAP SERPL CALC-SCNC: 6 MMOL/L — SIGNIFICANT CHANGE UP (ref 5–17)
APTT BLD: 29.7 SEC — SIGNIFICANT CHANGE UP (ref 24.5–35.6)
AST SERPL-CCNC: 43 U/L — HIGH (ref 15–37)
BASOPHILS # BLD AUTO: 0.08 K/UL — SIGNIFICANT CHANGE UP (ref 0–0.2)
BASOPHILS NFR BLD AUTO: 0.6 % — SIGNIFICANT CHANGE UP (ref 0–2)
BILIRUB SERPL-MCNC: 0.4 MG/DL — SIGNIFICANT CHANGE UP (ref 0.2–1.2)
BUN SERPL-MCNC: 35 MG/DL — HIGH (ref 7–23)
CALCIUM SERPL-MCNC: 8.5 MG/DL — SIGNIFICANT CHANGE UP (ref 8.5–10.1)
CHLORIDE SERPL-SCNC: 116 MMOL/L — HIGH (ref 96–108)
CO2 SERPL-SCNC: 26 MMOL/L — SIGNIFICANT CHANGE UP (ref 22–31)
CREAT SERPL-MCNC: 2.16 MG/DL — HIGH (ref 0.5–1.3)
EGFR: 31 ML/MIN/1.73M2 — LOW
EOSINOPHIL # BLD AUTO: 0.12 K/UL — SIGNIFICANT CHANGE UP (ref 0–0.5)
EOSINOPHIL NFR BLD AUTO: 0.9 % — SIGNIFICANT CHANGE UP (ref 0–6)
GLUCOSE SERPL-MCNC: 91 MG/DL — SIGNIFICANT CHANGE UP (ref 70–99)
HCT VFR BLD CALC: 29.6 % — LOW (ref 39–50)
HGB BLD-MCNC: 9.1 G/DL — LOW (ref 13–17)
IMM GRANULOCYTES NFR BLD AUTO: 0.5 % — SIGNIFICANT CHANGE UP (ref 0–0.9)
INR BLD: 1.16 RATIO — SIGNIFICANT CHANGE UP (ref 0.85–1.18)
LYMPHOCYTES # BLD AUTO: 0.95 K/UL — LOW (ref 1–3.3)
LYMPHOCYTES # BLD AUTO: 7.2 % — LOW (ref 13–44)
MCHC RBC-ENTMCNC: 26.2 PG — LOW (ref 27–34)
MCHC RBC-ENTMCNC: 30.7 GM/DL — LOW (ref 32–36)
MCV RBC AUTO: 85.3 FL — SIGNIFICANT CHANGE UP (ref 80–100)
MONOCYTES # BLD AUTO: 0.78 K/UL — SIGNIFICANT CHANGE UP (ref 0–0.9)
MONOCYTES NFR BLD AUTO: 5.9 % — SIGNIFICANT CHANGE UP (ref 2–14)
NEUTROPHILS # BLD AUTO: 11.27 K/UL — HIGH (ref 1.8–7.4)
NEUTROPHILS NFR BLD AUTO: 84.9 % — HIGH (ref 43–77)
PLATELET # BLD AUTO: 406 K/UL — HIGH (ref 150–400)
POTASSIUM SERPL-MCNC: 3.7 MMOL/L — SIGNIFICANT CHANGE UP (ref 3.5–5.3)
POTASSIUM SERPL-SCNC: 3.7 MMOL/L — SIGNIFICANT CHANGE UP (ref 3.5–5.3)
PROT SERPL-MCNC: 6.8 GM/DL — SIGNIFICANT CHANGE UP (ref 6–8.3)
PROTHROM AB SERPL-ACNC: 13 SEC — SIGNIFICANT CHANGE UP (ref 9.5–13)
RBC # BLD: 3.47 M/UL — LOW (ref 4.2–5.8)
RBC # FLD: 17.5 % — HIGH (ref 10.3–14.5)
SODIUM SERPL-SCNC: 148 MMOL/L — HIGH (ref 135–145)
WBC # BLD: 13.27 K/UL — HIGH (ref 3.8–10.5)
WBC # FLD AUTO: 13.27 K/UL — HIGH (ref 3.8–10.5)

## 2023-09-06 PROCEDURE — 12345: CPT | Mod: NC

## 2023-09-06 PROCEDURE — 74176 CT ABD & PELVIS W/O CONTRAST: CPT | Mod: 26

## 2023-09-06 PROCEDURE — 99223 1ST HOSP IP/OBS HIGH 75: CPT

## 2023-09-06 RX ORDER — SENNA PLUS 8.6 MG/1
2 TABLET ORAL AT BEDTIME
Refills: 0 | Status: DISCONTINUED | OUTPATIENT
Start: 2023-09-06 | End: 2023-09-13

## 2023-09-06 RX ORDER — HEPARIN SODIUM 5000 [USP'U]/ML
5000 INJECTION INTRAVENOUS; SUBCUTANEOUS EVERY 12 HOURS
Refills: 0 | Status: DISCONTINUED | OUTPATIENT
Start: 2023-09-06 | End: 2023-09-13

## 2023-09-06 RX ORDER — QUETIAPINE FUMARATE 200 MG/1
25 TABLET, FILM COATED ORAL AT BEDTIME
Refills: 0 | Status: DISCONTINUED | OUTPATIENT
Start: 2023-09-06 | End: 2023-09-13

## 2023-09-06 RX ORDER — CHOLECALCIFEROL (VITAMIN D3) 125 MCG
4000 CAPSULE ORAL DAILY
Refills: 0 | Status: DISCONTINUED | OUTPATIENT
Start: 2023-09-06 | End: 2023-09-13

## 2023-09-06 RX ORDER — CEFTRIAXONE 500 MG/1
1000 INJECTION, POWDER, FOR SOLUTION INTRAMUSCULAR; INTRAVENOUS EVERY 24 HOURS
Refills: 0 | Status: DISCONTINUED | OUTPATIENT
Start: 2023-09-06 | End: 2023-09-08

## 2023-09-06 RX ORDER — TAMSULOSIN HYDROCHLORIDE 0.4 MG/1
0.4 CAPSULE ORAL AT BEDTIME
Refills: 0 | Status: DISCONTINUED | OUTPATIENT
Start: 2023-09-06 | End: 2023-09-13

## 2023-09-06 RX ORDER — FERROUS SULFATE 325(65) MG
325 TABLET ORAL
Refills: 0 | Status: DISCONTINUED | OUTPATIENT
Start: 2023-09-06 | End: 2023-09-13

## 2023-09-06 RX ORDER — POLYETHYLENE GLYCOL 3350 17 G/17G
17 POWDER, FOR SOLUTION ORAL AT BEDTIME
Refills: 0 | Status: DISCONTINUED | OUTPATIENT
Start: 2023-09-06 | End: 2023-09-13

## 2023-09-06 RX ORDER — LACTULOSE 10 G/15ML
20 SOLUTION ORAL EVERY 12 HOURS
Refills: 0 | Status: DISCONTINUED | OUTPATIENT
Start: 2023-09-06 | End: 2023-09-13

## 2023-09-06 RX ORDER — ACETAMINOPHEN 500 MG
650 TABLET ORAL EVERY 6 HOURS
Refills: 0 | Status: DISCONTINUED | OUTPATIENT
Start: 2023-09-06 | End: 2023-09-13

## 2023-09-06 RX ORDER — ASCORBIC ACID 60 MG
500 TABLET,CHEWABLE ORAL
Refills: 0 | Status: DISCONTINUED | OUTPATIENT
Start: 2023-09-06 | End: 2023-09-13

## 2023-09-06 RX ORDER — SODIUM CHLORIDE 9 MG/ML
1000 INJECTION, SOLUTION INTRAVENOUS
Refills: 0 | Status: COMPLETED | OUTPATIENT
Start: 2023-09-06 | End: 2023-09-06

## 2023-09-06 RX ORDER — SODIUM CHLORIDE 9 MG/ML
1000 INJECTION, SOLUTION INTRAVENOUS
Refills: 0 | Status: DISCONTINUED | OUTPATIENT
Start: 2023-09-06 | End: 2023-09-07

## 2023-09-06 RX ORDER — MIRTAZAPINE 45 MG/1
7.5 TABLET, ORALLY DISINTEGRATING ORAL AT BEDTIME
Refills: 0 | Status: DISCONTINUED | OUTPATIENT
Start: 2023-09-06 | End: 2023-09-13

## 2023-09-06 RX ADMIN — LACTULOSE 20 GRAM(S): 10 SOLUTION ORAL at 10:43

## 2023-09-06 RX ADMIN — Medication 325 MILLIGRAM(S): at 22:35

## 2023-09-06 RX ADMIN — Medication 500 MILLIGRAM(S): at 10:42

## 2023-09-06 RX ADMIN — SENNA PLUS 2 TABLET(S): 8.6 TABLET ORAL at 22:35

## 2023-09-06 RX ADMIN — SODIUM CHLORIDE 75 MILLILITER(S): 9 INJECTION, SOLUTION INTRAVENOUS at 14:36

## 2023-09-06 RX ADMIN — LACTULOSE 20 GRAM(S): 10 SOLUTION ORAL at 22:37

## 2023-09-06 RX ADMIN — Medication 4000 UNIT(S): at 10:42

## 2023-09-06 RX ADMIN — TAMSULOSIN HYDROCHLORIDE 0.4 MILLIGRAM(S): 0.4 CAPSULE ORAL at 22:36

## 2023-09-06 RX ADMIN — Medication 650 MILLIGRAM(S): at 13:00

## 2023-09-06 RX ADMIN — Medication 325 MILLIGRAM(S): at 10:42

## 2023-09-06 RX ADMIN — QUETIAPINE FUMARATE 25 MILLIGRAM(S): 200 TABLET, FILM COATED ORAL at 22:35

## 2023-09-06 RX ADMIN — HEPARIN SODIUM 5000 UNIT(S): 5000 INJECTION INTRAVENOUS; SUBCUTANEOUS at 22:44

## 2023-09-06 RX ADMIN — HEPARIN SODIUM 5000 UNIT(S): 5000 INJECTION INTRAVENOUS; SUBCUTANEOUS at 10:42

## 2023-09-06 RX ADMIN — MIRTAZAPINE 7.5 MILLIGRAM(S): 45 TABLET, ORALLY DISINTEGRATING ORAL at 22:35

## 2023-09-06 RX ADMIN — Medication 500 MILLIGRAM(S): at 22:34

## 2023-09-06 RX ADMIN — SODIUM CHLORIDE 75 MILLILITER(S): 9 INJECTION, SOLUTION INTRAVENOUS at 01:54

## 2023-09-06 RX ADMIN — CEFTRIAXONE 1000 MILLIGRAM(S): 500 INJECTION, POWDER, FOR SOLUTION INTRAMUSCULAR; INTRAVENOUS at 16:06

## 2023-09-06 NOTE — DIETITIAN INITIAL EVALUATION ADULT - NSFNSGIIOFT_GEN_A_CORE
I&O's Detail    05 Sep 2023 07:01  -  06 Sep 2023 07:00  --------------------------------------------------------  IN:  Total IN: 0 mL    OUT:    Indwelling Catheter - Urethral (mL): 400 mL  Total OUT: 400 mL    Total NET: -400 mL

## 2023-09-06 NOTE — DIETITIAN INITIAL EVALUATION ADULT - ORAL INTAKE PTA/DIET HISTORY
Lives at [facility]. Unable to obtain diet/ wt hx 2/2 non-verbal at baseline. Per chart, poor PO intake and w/o BM x 4 days PTA. Lives at [facility]. Unable to obtain diet/ wt hx 2/2 non-verbal at baseline. Per chart, poor PO intake and w/o BM x 4 days PTA. On Remeron at NH.

## 2023-09-06 NOTE — PROGRESS NOTE ADULT - ASSESSMENT
78-year-old M with PMHx hypernatremia, depression, anxiety, psychosis, BPH, dementia, depression, chronic dutton, C2 non-union chronic fracture, and loose pedical screws from prior cervical surgery presents to ED from [place] for failure to thrive. Patient is non-verbal at baseline, unable to provide history. As per paperwork from facility, patient has not been eating/drinking, and has not had a BM in the last 4 days.     # Metabolic Encephalopathy 2/2 sepsis d/t UTI   - c/w ceftriaxone 1g o.d., adjust as needed s/p cx results  - leukocytosis improving, 14.65 on arrival now 13.27   - lactate 1.7   - IVF   - chronic dutton changed in ED on arrival     # ALEKSANDR on CKD IIIb/IV (eGFR 28 on arrival, now 31)    - pt baseline Cr 1.5 per nephro note 8/23/3023; Cr 2.34 on arrival   - IVF   - avoid nephrotoxic agents   - trend I&Os  - nephrology consult pending     # Decreased PO intake/constipation   - moderate stool burden on CT abd/pelvis  - IVF  - nutrition consult  - lactulose, miralax, senna     # Hip ulcer  - wound care consult    # Anemia/Thrombocytosis   - Hgb 9.1, Hct 29.6   - Platelet count 406  - f/u with OP heme for further work up     # Hypernatremia  - trend Na+, 148 on AM labs  - cw IVF at 0.45% NS     # Hx of depression, anxiety, BPH, dementia  - c/w home meds: tamsulosin, mirtazapine, quetiapine     # DVT ppx  - LMWH subq b.i.d.              78-year-old M with PMHx hypernatremia, depression, anxiety, psychosis, BPH, dementia, depression, chronic dutton, C2 non-union chronic fracture, and loose pedical screws from prior cervical surgery presents to ED from [place] for failure to thrive. Patient is non-verbal at baseline, unable to provide history. As per paperwork from facility, patient has not been eating/drinking, and has not had a BM in the last 4 days.     # Sepsis/Metabolic Encephalopathy 2/2 dutton catheter UTI   - c/w ceftriaxone 1g o.d., adjust as needed s/p cx results  - leukocytosis improving, 14.65 on arrival now 13.27   - lactate 1.7   - IVF   - chronic dutton changed in ED on arrival     # ALEKSANDR on CKD IIIb/IV (eGFR 28 on arrival, now 31)    - pt baseline Cr 1.5 per nephro note 8/23/3023; Cr 2.34 on arrival   - IVF   - avoid nephrotoxic agents   - trend I&Os  - nephrology consult pending     # Decreased PO intake/constipation   - moderate stool burden on CT abd/pelvis  - IVF  - nutrition consult  - lactulose, miralax, senna     # Hip ulcer  - wound care consult    # Anemia/Thrombocytosis   - Hgb 9.1, Hct 29.6   - Platelet count 406  - f/u with OP heme for further work up     # Hypernatremia  - trend Na+, 148 on AM labs  - cw IVF at 0.45% NS     # Hx of depression, anxiety, BPH, dementia  - c/w home meds: tamsulosin, mirtazapine, quetiapine     # DVT ppx  - LMWH subq b.i.d.              78-year-old M with PMHx hypernatremia, depression, anxiety, psychosis, BPH, dementia, depression, chronic dutton, C2 non-union chronic fracture, and loose pedical screws from prior cervical surgery presents to ED from [place] for failure to thrive. Patient is non-verbal at baseline, unable to provide history. As per paperwork from facility, patient has not been eating/drinking, and has not had a BM in the last 4 days.     # Sepsis/Metabolic Encephalopathy 2/2 dutton catheter UTI   - c/w ceftriaxone 1g o.d., adjust as needed s/p cx results  - leukocytosis improving, 14.65 on arrival now 13.27   - lactate 1.7   - IVF   - chronic dutton changed in ED on arrival     # ALEKSANDR on CKD IIIb/IV (eGFR 28 on arrival, now 31)    - pt baseline Cr 1.5 per nephro note 8/23/3023; Cr 2.34 on arrival   - cont IVF   - avoid nephrotoxic agents   - trend I&Os  - nephrology consult pending     # Decreased PO intake/constipation   - moderate stool burden on CT abd/pelvis  - IVF  - nutrition consult  - lactulose, miralax, senna     # Hip ulcer  - wound care consult    # Anemia/Thrombocytosis   - Hgb 9.1, Hct 29.6   - Platelet count 406  - f/u with OP heme for further work up     # Hypernatremia  - trend Na+, 148 on AM labs  - cw IVF at 0.45% NS     # Hx of depression, anxiety, BPH, dementia  - c/w home meds: tamsulosin, mirtazapine, quetiapine     # DVT ppx  - LMWH subq b.i.d.       contacted brother Jasson - updated on of care. [facility] DNR/DNI     personally seen and examined this patient with NP Andrew Calderon - reviewed assessment and plan and changes have been made where appropriate.

## 2023-09-06 NOTE — PATIENT PROFILE ADULT - FALL HARM RISK - HARM RISK INTERVENTIONS

## 2023-09-06 NOTE — DIETITIAN INITIAL EVALUATION ADULT - NSFNSPHYEXAMSKINFT_GEN_A_CORE
Rolly score = 10 ecchymotic, redness blanchable/non-blanchable skin   Pressure Injury 1: Left:, greater trochanter (hip), Suspected deep tissue injury  Pressure Injury 2: Right:, greater trochanter (hip), Unstageable  Pressure Injury 3: Left:, heel, Stage I  Pressure Injury 4: Right:, Rib cage, Stage I

## 2023-09-06 NOTE — DIETITIAN INITIAL EVALUATION ADULT - PERTINENT MEDS FT
MEDICATIONS  (STANDING):  ascorbic acid 500 milliGRAM(s) Oral two times a day  cefTRIAXone Injectable. 1000 milliGRAM(s) IV Push every 24 hours  cholecalciferol 4000 Unit(s) Oral daily  ferrous    sulfate 325 milliGRAM(s) Oral two times a day  heparin   Injectable 5000 Unit(s) SubCutaneous every 12 hours  lactulose Syrup 20 Gram(s) Oral every 12 hours  mirtazapine 7.5 milliGRAM(s) Oral at bedtime  polyethylene glycol 3350 17 Gram(s) Oral at bedtime  QUEtiapine 25 milliGRAM(s) Oral at bedtime  senna 2 Tablet(s) Oral at bedtime  sodium chloride 0.45%. 1000 milliLiter(s) (75 mL/Hr) IV Continuous <Continuous>  tamsulosin 0.4 milliGRAM(s) Oral at bedtime    MEDICATIONS  (PRN):  acetaminophen  Suppository .. 650 milliGRAM(s) Rectal every 6 hours PRN Temp greater or equal to 38C (100.4F)

## 2023-09-06 NOTE — DIETITIAN INITIAL EVALUATION ADULT - ADD RECOMMEND
1) C/w current PO diet and will add Ensure + HP shake BID to optimize nutritional needs (provides 350 kcal, 20g protein/ shake)   2) Encourage protein-rich foods, maximize food preferences  3) Monitor bowel movements, if no BM for >3 days, consider implementing bowel regimen.  4) Recommend to add MVI w/minerals, Vit C 500 mg BID, add Zinc Sulfate 220 mg x 10 days to promote wound healing. Also add Amrit BID (provides 90 kcal, 2.5 g collagen, 7 g L-Arginine, 7 g L-Glutamine/ 1 packet) to promote wound healing. Amrit is intended to support tissue building and collagen formation in the dietary management of wounds, which has been clinically shown to support wound healing (including pressure injuries, diabetic foot ulcers, surgical incisions, burns, and other acute/chronic wounds) by enhancing collagen formation in as little as 2 weeks.   5) Consider adding thiamine 200 mg daily 2/2 poor PO intake/ malnutrition   6) Monitor daily lytes/min and replete prn. consider checking B6, B12, thiamine, folate, carnitine, and copper levels as malnutrition in cause these to be deficient   7) Obtain weekly wt to track/trend changes. Monitor I&O  8) Maintain aspiration precautions, back of bed >45 degrees.   9) Goals of care confirmed; DNR/trial intubation/ NO TUBE FEED.  RD will continue to monitor PO intake, labs, hydration, and wt prn.  1) C/w current PO diet and will add Ensure + HP shake BID to optimize nutritional needs (provides 350 kcal, 20g protein/ shake)   2) Encourage protein-rich foods, maximize food preferences  3) Monitor bowel movements, if no BM for >3 days, consider implementing bowel regimen.  4) Recommend to add MVI w/minerals, Vit C 500 mg BID, add Zinc Sulfate 220 mg x 10 days to promote wound healing. Also add Amrit BID (provides 90 kcal, 2.5 g collagen, 7 g L-Arginine, 7 g L-Glutamine/ 1 packet) to promote wound healing. Amrit is intended to support tissue building and collagen formation in the dietary management of wounds, which has been clinically shown to support wound healing (including pressure injuries, diabetic foot ulcers, surgical incisions, burns, and other acute/chronic wounds) by enhancing collagen formation in as little as 2 weeks.   5) Consider adding thiamine 200 mg daily 2/2 poor PO intake/ malnutrition   6) Monitor daily lytes/min and replete prn. consider checking B6, B12, thiamine, folate, carnitine, and copper levels as malnutrition in cause these to be deficient   7) Obtain weekly wt to track/trend changes. Monitor I&O  8) C/w appetite stimulant Remeron 2/2 chronically poor appetite/ PO intake   9) Maintain aspiration precautions, back of bed >45 degrees.   10) Goals of care confirmed; DNR/trial intubation/ NO TUBE FEED.  RD will continue to monitor PO intake, labs, hydration, and wt prn.

## 2023-09-06 NOTE — DIETITIAN INITIAL EVALUATION ADULT - OTHER INFO
79 y/o M w/ PMH of hypernatremia, depression / anxiety, psychosis, BPH, dementia, depression, C2 non-union chronic fracture, loose pedical screws from prior cervical surgery, dementia, p/w failure to thrive. Patient is non-verbal and unable to provide history. As per paperwork from facility, patient has not been eating / drinking, and also has not had a BM in the last 4 days.  DNR/ trial DNI/ NFT.    Pt known to RD service, met criteria for PCM on previous admissions. Unable to obtain info during assessment 2/2 non-verbal. Upon RD visit, pt sleeping and breakfast tray at bed side untouched; on soft and bite-sized. Wt hx: 128# on 8/19/23; 160# on 6/3/23; 140# on 2/12/21. Bed scale wt of 116# taken by RD on 9/6 (mild-mod edema may be skewing current wt appearance); 12# wt loss/ 9.37% x 3 weeks - severe and clinically significant. NFPE reveals severe muscle/fat wasting - appeared very thin, frail, and bony. Noted w/ multiple PI's. Will add Ensure + HP shake BID to optimize nutritional needs/ promote wound healing (provides 350 kcal, 20g protein/ shake). Will also add Amrit BID (provides 90 kcal, 2.5 g collagen, 7 g L-Arginine, 7 g L-Glutamine/ 1 packet) to promote wound healing. Amrit is intended to support tissue building and collagen formation in the dietary management of wounds, which has been clinically shown to support wound healing (including pressure injuries, diabetic foot ulcers, surgical incisions, burns, and other acute/chronic wounds) by enhancing collagen formation in as little as 2 weeks. Consider adding prune juice w/ meals to promote BM (w/o BM x 4 days; on senna, lactulose, miralax). See additional recommendations below.

## 2023-09-06 NOTE — PROGRESS NOTE ADULT - SUBJECTIVE AND OBJECTIVE BOX
SUBJECTIVE:   HPI: 78-year-old M with PMHx hypernatremia, depression, anxiety, psychosis, BPH, dementia, depression, chronic dutton, C2 non-union chronic fracture, and loose pedical screws from prior cervical surgery presents to ED from [place] for failure to thrive. Patient is non-verbal at baseline, unable to provide history. As per paperwork from facility, patient has not been eating/drinking, and has not had a BM in the last 4 days.     ED course: blood cxs, urine cxs completed. + UTI on UA. + leukocytosis 14.65. NS fluid bolus completed. Ceftriaxone 1g IVP o.d. initiated. admitted for UTI, FTT, constipation     9/6: pt examined sitting up in bed, appearing uncomfortable. pt non-verbal, unable to participate in exam. pt contracted and frail appearing.       REVIEW OF SYSTEMS: unable to obtain 2/2 dementia, pt non-verbal.     Height (cm): 175.3 (09-05 @ 14:13)  Weight (kg): 61.6 (09-06 @ 01:00)  BMI (kg/m2): 20 (09-06 @ 01:00)  BSA (m2): 1.75 (09-06 @ 01:00)    Vital Signs Last 24 Hrs  T(C): 37.1 (06 Sep 2023 08:14), Max: 37.4 (05 Sep 2023 18:37)  T(F): 98.8 (06 Sep 2023 08:14), Max: 99.4 (05 Sep 2023 18:37)  HR: 96 (06 Sep 2023 08:14) (87 - 96)  BP: 131/64 (06 Sep 2023 08:14) (108/61 - 131/64)  BP(mean): 72 (06 Sep 2023 01:00) (72 - 90)  RR: 18 (06 Sep 2023 08:14) (16 - 18)  SpO2: 95% (06 Sep 2023 08:14) (95% - 100%)    Parameters below as of 06 Sep 2023 08:14  Patient On (Oxygen Delivery Method): room air        I&O's Summary    05 Sep 2023 07:01  -  06 Sep 2023 07:00  --------------------------------------------------------  IN: 0 mL / OUT: 400 mL / NET: -400 mL        PHYSICAL EXAM:    Constitutional: uncomfortable appearing. awake, non-verbal. non-participatory in exam. frail appearing, contracted   HEENT: PERR, EOMI, dry mucous membranes, discharge present to BL conjunctiva   Neck: contracted. No JVD  Respiratory: Breath sounds diminished in bases bilaterally, No wheezing, rales or rhonchi  Cardiovascular: S1 and S2, regular rate and rhythm, no Murmurs, gallops or rubs  Gastrointestinal: Bowel Sounds present, soft, nontender, nondistended, no guarding, no rebound  Genitourinary: chronic dutton in place   Extremities: No peripheral edema  Vascular: 2+ peripheral pulses  Neurological: A/O x 0, non-verbal, no focal deficits  Musculoskeletal: unable to assess motor strength given pts inability to participate in physical examination   Skin: No rashes    MEDICATIONS:  MEDICATIONS  (STANDING):  ascorbic acid 500 milliGRAM(s) Oral two times a day  cefTRIAXone Injectable. 1000 milliGRAM(s) IV Push every 24 hours  cholecalciferol 4000 Unit(s) Oral daily  ferrous    sulfate 325 milliGRAM(s) Oral two times a day  heparin   Injectable 5000 Unit(s) SubCutaneous every 12 hours  lactulose Syrup 20 Gram(s) Oral every 12 hours  mirtazapine 7.5 milliGRAM(s) Oral at bedtime  polyethylene glycol 3350 17 Gram(s) Oral at bedtime  QUEtiapine 25 milliGRAM(s) Oral at bedtime  senna 2 Tablet(s) Oral at bedtime  sodium chloride 0.45%. 1000 milliLiter(s) (75 mL/Hr) IV Continuous <Continuous>  tamsulosin 0.4 milliGRAM(s) Oral at bedtime      LABS: All Labs Reviewed:                        9.1    13.27 )-----------( 406      ( 06 Sep 2023 09:12 )             29.6     09-06    148<H>  |  116<H>  |  35<H>  ----------------------------<  91  3.7   |  26  |  2.16<H>    Ca    8.5      06 Sep 2023 09:12  Mg     2.4     09-05    TPro  6.8  /  Alb  2.1<L>  /  TBili  0.4  /  DBili  x   /  AST  43<H>  /  ALT  28  /  AlkPhos  84  09-06    PT/INR - ( 06 Sep 2023 09:12 )   PT: 13.0 sec;   INR: 1.16 ratio         PTT - ( 06 Sep 2023 09:12 )  PTT:29.7 sec        Urinalysis (09.05.23 @ 15:17)   Glucose Qualitative, Urine: Negative   Blood, Urine: Moderate   pH Urine: 5.0   Color: Yellow   Urine Appearance: very cloudy   Bilirubin: Negative   Ketone - Urine: Negative   Specific Gravity: 1.020   Protein, Urine: 100   Urobilinogen: Negative   Nitrite: Positive   Leukocyte Esterase Concentration: Moderate      Blood Culture: pending  Urine Culture: pending      RADIOLOGY/EKG:  < from: CT Abdomen and Pelvis No Cont (09.06.23 @ 07:57) >  IMPRESSION:  Moderate stool burden. No bowel obstruction.    Circumferential bladder wall thickening, nonspecific. Findings may be   secondary to chronic outlet obstruction versus cystitis. Correlate with   urinalysis.    Atrophic left kidney. No hydronephrosis.          --- End of Report ---            KERA LLOYD MD; Attending Radiologist  This document has been electronically signed. Sep  6 2023 11:17AM    < end of copied text >               SUBJECTIVE:   HPI: 78-year-old M with PMHx hypernatremia, depression, anxiety, psychosis, BPH, dementia, depression, chronic dutton, C2 non-union chronic fracture, and loose pedical screws from prior cervical surgery presents to ED from [place] for failure to thrive. Patient is non-verbal at baseline, unable to provide history. As per paperwork from facility, patient has not been eating/drinking, and has not had a BM in the last 4 days.     ED course: blood cxs, urine cxs completed. + UTI on UA. + leukocytosis 14.65. NS fluid bolus completed. Ceftriaxone 1g IVP o.d. initiated. admitted for UTI, FTT, constipation     9/6: pt examined sitting up in bed, appearing uncomfortable. pt non-verbal, unable to participate in exam. pt contracted and frail appearing.       REVIEW OF SYSTEMS: unable to obtain 2/2 dementia, pt non-verbal.     Height (cm): 175.3 (09-05 @ 14:13)  Weight (kg): 61.6 (09-06 @ 01:00)  BMI (kg/m2): 20 (09-06 @ 01:00)  BSA (m2): 1.75 (09-06 @ 01:00)    Vital Signs Last 24 Hrs  T(C): 37.1 (06 Sep 2023 08:14), Max: 37.4 (05 Sep 2023 18:37)  T(F): 98.8 (06 Sep 2023 08:14), Max: 99.4 (05 Sep 2023 18:37)  HR: 96 (06 Sep 2023 08:14) (87 - 96)  BP: 131/64 (06 Sep 2023 08:14) (108/61 - 131/64)  BP(mean): 72 (06 Sep 2023 01:00) (72 - 90)  RR: 18 (06 Sep 2023 08:14) (16 - 18)  SpO2: 95% (06 Sep 2023 08:14) (95% - 100%)    Parameters below as of 06 Sep 2023 08:14  Patient On (Oxygen Delivery Method): room air        I&O's Summary    05 Sep 2023 07:01  -  06 Sep 2023 07:00  --------------------------------------------------------  IN: 0 mL / OUT: 400 mL / NET: -400 mL        PHYSICAL EXAM:    Constitutional: uncomfortable appearing. awake, non-verbal. non-participatory in exam. frail appearing, contracted   HEENT: PERR, EOMI, dry mucous membranes, discharge present to BL conjunctiva   Neck: contracted. No JVD  Respiratory: Breath sounds diminished in bases bilaterally, No wheezing, rales or rhonchi  Cardiovascular: S1 and S2, regular rate and rhythm, no Murmurs, gallops or rubs  Gastrointestinal: Bowel Sounds present, soft, nontender, nondistended, no guarding, no rebound  Genitourinary: chronic dutton in place , dark yellow urine with sediment   Extremities: No peripheral edema  Vascular: 2+ peripheral pulses  Neurological: A/O x 0, non-verbal, no focal deficits  Musculoskeletal: unable to assess motor strength given pts inability to participate in physical examination   Skin: No rashes      MEDICATIONS:  MEDICATIONS  (STANDING):  ascorbic acid 500 milliGRAM(s) Oral two times a day  cefTRIAXone Injectable. 1000 milliGRAM(s) IV Push every 24 hours  cholecalciferol 4000 Unit(s) Oral daily  ferrous    sulfate 325 milliGRAM(s) Oral two times a day  heparin   Injectable 5000 Unit(s) SubCutaneous every 12 hours  lactulose Syrup 20 Gram(s) Oral every 12 hours  mirtazapine 7.5 milliGRAM(s) Oral at bedtime  polyethylene glycol 3350 17 Gram(s) Oral at bedtime  QUEtiapine 25 milliGRAM(s) Oral at bedtime  senna 2 Tablet(s) Oral at bedtime  sodium chloride 0.45%. 1000 milliLiter(s) (75 mL/Hr) IV Continuous <Continuous>  tamsulosin 0.4 milliGRAM(s) Oral at bedtime      LABS: All Labs Reviewed:                        9.1    13.27 )-----------( 406      ( 06 Sep 2023 09:12 )             29.6     09-06    148<H>  |  116<H>  |  35<H>  ----------------------------<  91  3.7   |  26  |  2.16<H>    Ca    8.5      06 Sep 2023 09:12  Mg     2.4     09-05    TPro  6.8  /  Alb  2.1<L>  /  TBili  0.4  /  DBili  x   /  AST  43<H>  /  ALT  28  /  AlkPhos  84  09-06    PT/INR - ( 06 Sep 2023 09:12 )   PT: 13.0 sec;   INR: 1.16 ratio         PTT - ( 06 Sep 2023 09:12 )  PTT:29.7 sec        Urinalysis (09.05.23 @ 15:17)   Glucose Qualitative, Urine: Negative   Blood, Urine: Moderate   pH Urine: 5.0   Color: Yellow   Urine Appearance: very cloudy   Bilirubin: Negative   Ketone - Urine: Negative   Specific Gravity: 1.020   Protein, Urine: 100   Urobilinogen: Negative   Nitrite: Positive   Leukocyte Esterase Concentration: Moderate      Blood Culture: pending  Urine Culture: pending      RADIOLOGY/EKG:  < from: CT Abdomen and Pelvis No Cont (09.06.23 @ 07:57) >  IMPRESSION:  Moderate stool burden. No bowel obstruction.    Circumferential bladder wall thickening, nonspecific. Findings may be   secondary to chronic outlet obstruction versus cystitis. Correlate with   urinalysis.    Atrophic left kidney. No hydronephrosis.          --- End of Report ---            KERA LLOYD MD; Attending Radiologist  This document has been electronically signed. Sep  6 2023 11:17AM    < end of copied text >

## 2023-09-06 NOTE — H&P ADULT - CONVERSATION DETAILS
-Patient has a MOLST for DNR / DNI. Called family, but no answer. Will need to confirm and updated MOLST in AM

## 2023-09-06 NOTE — DIETITIAN NUTRITION RISK NOTIFICATION - ADDITIONAL COMMENTS/DIETITIAN RECOMMENDATIONS
Pls fax my updated pre op Thanks 1) C/w current PO diet and will add Ensure + HP shake BID to optimize nutritional needs (provides 350 kcal, 20g protein/ shake)   2) Encourage protein-rich foods, maximize food preferences  3) Monitor bowel movements, if no BM for >3 days, consider implementing bowel regimen.  4) Recommend to add MVI w/minerals, Vit C 500 mg BID, add Zinc Sulfate 220 mg x 10 days to promote wound healing. Also add Amrit BID (provides 90 kcal, 2.5 g collagen, 7 g L-Arginine, 7 g L-Glutamine/ 1 packet) to promote wound healing. Amrit is intended to support tissue building and collagen formation in the dietary management of wounds, which has been clinically shown to support wound healing (including pressure injuries, diabetic foot ulcers, surgical incisions, burns, and other acute/chronic wounds) by enhancing collagen formation in as little as 2 weeks.   5) Consider adding thiamine 200 mg daily 2/2 poor PO intake/ malnutrition   6) Monitor daily lytes/min and replete prn. consider checking B6, B12, thiamine, folate, carnitine, and copper levels as malnutrition in cause these to be deficient   7) Obtain weekly wt to track/trend changes. Monitor I&O  8) C/w appetite stimulant Remeron 2/2 chronically poor appetite/ PO intake   9) Maintain aspiration precautions, back of bed >45 degrees.   10) Goals of care confirmed; DNR/trial intubation/ NO TUBE FEED.  RD will continue to monitor PO intake, labs, hydration, and wt prn.

## 2023-09-06 NOTE — PATIENT PROFILE ADULT - NSPROGENBLOODRESTRICT_GEN_A_NUR
PROGRESS NOTE    **THIS VISIT WAS DONE BY TWO-WAY LIVE AUDIO/VIDEO TECHNOLOGY ON 1/6/22 AT 1:45pm**    THE PATIENT/PARENT VERBALLY AGREES TO THIS VISIT    Writer spoke with patient and parent to obtain history. Patient and writer both in their respective homes during the visit.     Reason for Visit: Medication Management and Therapy  Patient accompanied by: mother  Total time spent: 30 minutes, with at least 16 minutes spent in psychotherapy    Identifying information: Mukesh Ramos (Dom) is a 18 year old male with current working diagnosis of depression, anxiety who presents to clinic for follow-up.     BRIEF HISTORY OF PRESENT ILLNESS:    Writer met with the patient and mother together and spoke with both individually to gather history.    Emotional/Behavioral:  Current regimen consisting of bupropion  mg daily.  Writer met with the patient to review progress in the interim.  Patient reports he was able start medication and has tolerated it well thus far.  In general he has not seen significant change.  Overall he tells me he feels \"fine\" most days.  He denies persistent depression though also states he is \"not in the best mood\" all the time.  He still struggles with diminished concentration and focus, not having much motivation when he comes to school.  He was however able to bring up his grades towards the end of the semester and ended up getting all B's in his classes when he was initially feeling.  Mom feels there has been slight improvement though also feels patient tends to minimize his symptoms.   Mukesh denies current suicidal or homicidal ideation, auditory or visual hallucinations, ideas of reference, or paranoia and both patient and parent can contract for safety. Mukesh denies current use of alcohol, tobacco or illicit drugs.    According to parents, they began seeing a change in patient's demeanor/behavior over the past couple of years.  Mom says there has been a \"big difference in  personality\" over this time.  Prior to the start of the pandemic, mom began seeing concerns where patient was beginning to isolate, seemed different.  A few months later, a close friend of his passed away via suicide.  Mom talked about how patient and his friend were bullied around this time by select peers from school.  Patient typically does not like to talk about this.  Shortly after his friend passing away, COVID 19 pandemic began and led to further social isolation.  Historically, mom describes patient to be reserved and to himself though the pandemic further exacerbated some of his symptoms.  He tends to struggle with trusting friends and has struggled with finding a connection with peers in high school.  Parents encouraged him to be involved in sports so to have some social interaction though recently he has shared that he does not play any longer.  More recently, there have been 2 other kids from his high school that have passed away via suicide, neither however known to patient very well.  Parents have received some messages from friends of Mukesh's who share that they have been \"concerned about Mukesh\".  Dom reports he has struggled with adjusting to high school and is currently failing 4 of his classes.  Previously he was an honor roll student and now he is missing assignments, not doing them or turning them in.  Patient tells me he is \"not a fan of therapy\" and briefly did a few visits with a therapist.  He denies significant depression but describes high school as being a \"depressing place\".  Endorses lack of motivation when comes to school, low energy, trouble focusing.        Anxiety:  Patient reports his anxiety is \"almost gone\". Mukesh denies excessive worries, nervousness or feeling restless/on edge most days. Patient denies difficulty with managing worries, panic symptoms or trouble relaxing. Denies any acute stressors.      Historically, school tends to be a trigger for anxiety according to  dad.  Grades and homework completion is also source of stress.  He is considerably behind in many of his classes and feels it is \"pointless\" to put in any work.  He denies feeling anxious or worrying most days.  Does endorse some anxiety in social situations in the past, experiencing panic attacks.        ADHD evaluation:  Patient reports he still struggles at times with focus and sustained attention.  He was able to bring up his grades towards the end of the semester to all B's.  Parents were not able to get the teacher [place] forms completed.      Historically, parents describe him to be a honor roll student.  They began noticing struggles with start of high school.  There is family history of ADHD which led to parents wanting him to be evaluated for such.  Mom feels his grades declined significantly after COVID-19 pandemic, remote learning where he lost a lot of motivation with schoolwork.  He would often be on his computer, playing games.  Now being back in school he is often on his cell phone playing games.  Teachers try telling him to put away his phone and sometimes he complies, sometimes he does not.  He was getting straight A's up until high school.  He began noticing grades declining last year (remote learning).  Mom explains that he has been having symptoms of inattention, distractibility since grade school though parents never pursued medication evaluation due to his older brother experiencing side effects with medications and parents feeling at the time that Mukesh was doing academically well.      Sleep: Some nights (2-3/7 nights, though parents say 1 night) when he has hockey, gets home around 11:30pm. Doesn't fall asleep until around midnight-1:30am. If doesn't have hockey, still falls asleep around same time. Awake by 6:30am. Patient denies taking any naps during the day. Mom felt he was better when getting more sleep.     Therapy: Part of the session was spent providing supportive  psychotherapy. Reviewed progress in the interim between visits. Discussed recent stressors and processed ways of continuing to use healthy adaptive skills to manage. A portion of the session focused on assessment of factors contributing to current presentation.    Mukesh was coached in a variety of coping strategies and the appropriate expression of thoughts, feelings, needs and steps to problem solving collaboratively.      Writer met with parent to review progress in the interm.  Mom feels there has been some small improvement since he started medication.  She inquired about the possibility of increasing medication further. A portion of the session focused on psychoeducation about Mukesh's condition, support, and discussion of effective parenting strategies. Family dynamics and communication patterns possibly contributing to deterioration of patient's functioning were assessed and discussed. Writer provided coaching/modeling and reinforced effective parenting strategies, behavioral interventions for home and collaborative problem solving skills. Parents were educated regarding factors impacting the Mukesh's behavioral and emotional functioning.  Current medications:   Current Outpatient Medications   Medication Sig   • buPROPion (WELLBUTRIN SR) 100 MG 12 hr tablet TAKE 1 TABLET BY MOUTH DAILY   • albuterol 108 (90 Base) MCG/ACT inhaler INHALE 2 PUFFS INTO THE LUNGS EVERY 4 HOURS AS NEEDED FOR SHORTNESS OF BREATH OR WHEEZING     No current facility-administered medications for this visit.     Patient taking medications exactly as prescribed per previous visit: Yes  Side effects: No  Vitals: There were no vitals taken for this visit.    Review of Systems: All systems reviewed and negative with exception of below   - Psychiatric: less anxious and depressed, no psychosis or cornelius   - Neurological: no headaches or weakness   - Cardiac: no chest pain, palpitations, or shortness of breath    Psychiatric  History:  Pertinent history reviewed with patient and parents with no clinically significant changes in the interim.   Previous diagnosis: Per father, patient has history of social anxiety  Neuropsychological testing: none  Previous outpatient treatment:             - Therapy: [facility]              - Medication management: [facility] (Emily Prado)  Previous medication trials:              - Lexapro: brief trial of 2 months->patient self-discontinued, didn't feel helpful but parents felt small improvement, patient not compliant  Previous hospitalizations:             - IPU: none             - PHP/IOP: none  Previous suicide attempts/self-injury: none  Current outpatient providers:             - Therapy: none at present    Medical and Developmental History:  Pertinent history reviewed with patient and parents with no clinically significant changes in the interim.   DEVELOPMENTAL HISTORY  Prenatal:   Non-contributory  :   Premature delivery: 36 weeks early  Birth weight: 6lbs  Vaginal delivery  Developmental Milestones:   Within normal limits  Temperament:   Reactive   Typical Discipline: taking away privileges     MEDICAL HISTORY  - Allergies: NKDA  - Head injury: none, Loss of consciousness:  No  - Seizures: none  - Heart problems: none  - Medical Hospitalizations (dates, locations, reasons): none  - Date of last completed physical exam:   - Immunizations up to date:  Yes  - Past surgical history:  Ear tubes     - Pediatrician: Dr. Rojas     MEDICAL PROBLEMS - RELATIVES:  - Heart disease: paternal grandfather (CAD)  - Diabetes: maternal grandparents, paternal grandfather  - No history of early/sudden death, tics, headaches, thyroid problems or seizures reported     PSYCHIATRIC PROBLEMS - RELATIVES:  - ADHD: paternal uncle (Adderall), brother  - Anxiety: mom (Lexapro), brother, maternal grandmother  - Depression: paternal grandmother, maternal grandmother  - Bipolar: maternal uncle  -  Substance abuse: marijuana (brother), heroin (maternal uncle), alcohol (maternal side)  - Suicide completion: none reported    Social History:  Pertinent history reviewed with patient and parents with no clinically significant changes in the interim.   Stressors:   COVID-19  Family Constellation  Patient lives at home with parents, brother and sister.     Mother: Birth, Name: Gloria Ramos, Age: 48 years  Father:  Birth, Name: Chidi Ramos, Age: 46 years     Biological Siblings:    Name: Ivan, 19 years old  Name: Taryn, 14 years old  Legal Guardianship of Patient:  Biological Mother  Biological Father  Highest Education Level  Father: Some College  Mother: bachelor  Occupation:  Father: commercial banking  Mother: ROSIO previously at [employer], now stay at home parent  Episcopalian Preference:  Confucianism  DCFS Involvement: No  Juvenile Court Involvement: No     SUBSTANCE ABUSE HISTORY  Alcohol: none reported  Cigarettes: none reported  Marijuana: none reported      SCHOOL HISTORY  Current school: [employer], [employer]  Grade level:  11th, Failed previous grade: No  Other school history:    Attendance:  good  Behavior:  good  - Grades: below average  - Special education: No  - Honors/accelerated program: Yes (2)  - IEP/504 plan: none     INTERESTS  Hockey, video games    MENTAL STATUS EXAM:    Behavioral Observations: alert, cooperative, polite, appropriate eye contact and normal gait and station  Appearance: somewhat disheveled, fairhygiene and grooming, casually dressed, average size and appears stated age  Separation from escort: Normal  Manner of relating to examiner: cooperative  Psychomotor activity: +retardation, no abnormal movements including tics, tremors, stereotypies, or catatonia  Muscle strength/tone: within normal limits  Speech: logical, coherent, soft, slow and no perseveration or paucity of language  Receptive Language: Normal  Expressive Language: Normal    Mood: \"fine\"  Affective  expression: flat  Thought process: logical and coherent, no thought blocking, looseness of associations, tangential or circumstantial thinking or derailments  Though content: no delusions, obsessions/ruminations, phobias or preoccupations  Suicidal/Homicidal ideation: Absent  Perceptions: no auditory or visual hallucinations, no tactile or olfactory hallucinations, depersonalization or derealization  Orientation: person/place/time/situation  Attention/Concentration: normal  Memory: Normal  Impulse Control: Normal  Judgment: limited  Insight: partial  Intelligence/Fund of knowledge (clinical estimate): average    SCALES/RECORDS REVIEWED: chart review    ASSESSMENT: Mukesh Ramos is a 18 year old male who presents to clinic for follow-up.     DIAGNOSIS:   Major Depressive Disorder  R/o Attention-Deficit/Hyperactivity Disorder, Predominantly Inattentive Presentation    THERAPY MODALITY:   Cognitive Behavioral Therapy  Supportive Therapy  Psychoeducational    TREATMENT PLAN:   - Therapy: Recommend continued engagement in psychotherapy services with emphasis on improving interpersonal skills/communication, as well as coping skills to manage intense depression.   According to parents, patient is resistant to psychotherapy services at this time.  - Medication management: Recommend increasing Bupropion to  mg daily.  - Writer encouraged parent to take patient to ER or call 911 if patient endorses any suicidal or homicidal ideation, expresses auditory or visual hallucination or in case of any other emergency.  Patient and parents agree to call or contact writer with any questions, concerns, or worsening in mood or behavior.  - Provided psychoeducation regarding current working diagnosis and available treatment options and answered any questions brought forth. Supportive listening and reassurance provided.   - Return to clinic: 1 month  SCALES/RECORDS ORDERED:  none  INFORMED CONSENT:   Name of Medication:  Bupropion  Indications: mood  Discussed: Risks, Benefits and Alternatives  Obtained: Parent/Guardian's Consent and Patient's Assent    Disclaimer: This chart was completed using dragon medical voice recognition.  There may be some typographical errors in transcription due to the limitations inherent within the voice activated computer dictation.  This should not adversely affect the overall integrity of the document.  Please contact the author with any questions regarding the contents of this note.    Electronically signed by:   Jam Carroll MD               unknown, pt confused/blood borne infection concerns

## 2023-09-06 NOTE — DIETITIAN INITIAL EVALUATION ADULT - FACTORS AFF FOOD INTAKE
change in mental status/difficulty feeding self/difficulty swallowing/persistent constipation/persistent lack of appetite

## 2023-09-06 NOTE — H&P ADULT - HISTORY OF PRESENT ILLNESS
77 y/o M w/ PMH of hypernatremia, depression / anxiety, psychosis, BPH, dementia, depression, p/w failure to thrive. Patient is non-verbal and unable to provide history. As per paperwork from facility, patient has not been eating / drinking, and also has not had a BM in the last 4 days.       PSH: C-spine surgery     Social Hx: Unable to provide     Family Hx: Unable to provide  79 y/o M w/ PMH of hypernatremia, depression / anxiety, psychosis, BPH, dementia, depression, C2 non-union chronic fracture, loose pedical screws from prior cervical surgery, dementia, p/w failure to thrive. Patient is non-verbal and unable to provide history. As per paperwork from facility, patient has not been eating / drinking, and also has not had a BM in the last 4 days.     PSH: C-spine surgery     Social Hx: Unable to provide     Family Hx: Unable to provide

## 2023-09-06 NOTE — DIETITIAN INITIAL EVALUATION ADULT - PERTINENT LABORATORY DATA
09-06    148<H>  |  116<H>  |  35<H>  ----------------------------<  91  3.7   |  26  |  2.16<H>    Ca    8.5      06 Sep 2023 09:12  Mg     2.4     09-05    TPro  6.8  /  Alb  2.1<L>  /  TBili  0.4  /  DBili  x   /  AST  43<H>  /  ALT  28  /  AlkPhos  84  09-06

## 2023-09-06 NOTE — H&P ADULT - ASSESSMENT
79 y/o M w/ PMH of hypernatremia, depression / anxiety, psychosis, BPH, dementia, depression, p/w failure to thrive    *Metabolic encephalopathy 2/2 sepsis 2/2 UTI  -Ceftriaxone   -F/u urine / blood cx   -Leukocytosis = 14.65   -Lactate = 1.7  -F/u CT abd   -IVF   -Change dutton     *Suspect acute on CKDIII   -As per nephro note from 8/23/23, patient's baseline creatinine ~1.5  -IVF  -Nephro consult  -Avoid nephrotoxic agents  -I/Os     *Decreased oral intake / constipation  -F/u CT abd   -IVF   -Nutrition consult   -On lactulose / miralax / senna     *Sacral ulcer??  -Wound consult     *Anemia / Thrombocytosis   -F/u outpatient heme for further work up     *Hypernatremia  -F/u Na in AM     *H/o Depression / anxiety / BPH /  dementia   -C/w home meds and f/u outpatient for further management     *DVT ppx   -Heparin SubQ    79 y/o M w/ PMH of hypernatremia, depression / anxiety, psychosis, BPH, dementia, depression, C2 non-union chronic fracture, loose pedical screws from prior cervical surgery, dementia, p/w failure to thrive    *Metabolic encephalopathy 2/2 sepsis 2/2 UTI  -Ceftriaxone   -F/u urine / blood cx   -Leukocytosis = 14.65   -Lactate = 1.7  -F/u CT abd   -IVF   -Change dutton     *Suspect acute on CKDIII   -As per nephro note from 8/23/23, patient's baseline creatinine ~1.5  -IVF  -Nephro consult  -Avoid nephrotoxic agents  -I/Os     *Decreased oral intake / constipation  -F/u CT abd   -IVF   -Nutrition consult   -On lactulose / miralax / senna     *Sacral ulcer??  -Wound consult     *Anemia / Thrombocytosis   -F/u outpatient heme for further work up     *Hypernatremia  -F/u Na in AM     *H/o Depression / anxiety / BPH /  dementia   -C/w home meds and f/u outpatient for further management     *DVT ppx   -Heparin SubQ  79 y/o M w/ PMH of hypernatremia, depression / anxiety, psychosis, BPH, dementia, depression, C2 non-union chronic fracture, loose pedical screws from prior cervical surgery, dementia, p/w failure to thrive    *Metabolic encephalopathy 2/2 sepsis 2/2 UTI  -Ceftriaxone   -F/u urine / blood cx   -Leukocytosis = 14.65   -Lactate = 1.7  -F/u CT abd   -IVF   -Chronic dutton changed     *Suspect acute on CKDIII   -As per nephro note from 8/23/23, patient's baseline creatinine ~1.5  -IVF  -Nephro consult  -Avoid nephrotoxic agents  -I/Os     *Decreased oral intake / constipation  -F/u CT abd   -IVF   -Nutrition consult   -On lactulose / miralax / senna     *Hip ulcer  -Wound consult     *Anemia / Thrombocytosis   -F/u outpatient heme for further work up     *Hypernatremia  -F/u Na in AM     *H/o Depression / anxiety / BPH /  dementia   -C/w home meds and f/u outpatient for further management     *DVT ppx   -Heparin SubQ

## 2023-09-07 ENCOUNTER — TRANSCRIPTION ENCOUNTER (OUTPATIENT)
Age: 78
End: 2023-09-07

## 2023-09-07 DIAGNOSIS — F03.90 UNSPECIFIED DEMENTIA, UNSPECIFIED SEVERITY, WITHOUT BEHAVIORAL DISTURBANCE, PSYCHOTIC DISTURBANCE, MOOD DISTURBANCE, AND ANXIETY: ICD-10-CM

## 2023-09-07 LAB
ALBUMIN SERPL ELPH-MCNC: 2 G/DL — LOW (ref 3.3–5)
ALP SERPL-CCNC: 78 U/L — SIGNIFICANT CHANGE UP (ref 40–120)
ALT FLD-CCNC: 25 U/L — SIGNIFICANT CHANGE UP (ref 12–78)
AMMONIA BLD-MCNC: 45 UMOL/L — HIGH (ref 11–32)
ANION GAP SERPL CALC-SCNC: 9 MMOL/L — SIGNIFICANT CHANGE UP (ref 5–17)
AST SERPL-CCNC: 40 U/L — HIGH (ref 15–37)
BILIRUB SERPL-MCNC: 0.4 MG/DL — SIGNIFICANT CHANGE UP (ref 0.2–1.2)
BUN SERPL-MCNC: 34 MG/DL — HIGH (ref 7–23)
CALCIUM SERPL-MCNC: 9.1 MG/DL — SIGNIFICANT CHANGE UP (ref 8.5–10.1)
CHLORIDE SERPL-SCNC: 118 MMOL/L — HIGH (ref 96–108)
CO2 SERPL-SCNC: 24 MMOL/L — SIGNIFICANT CHANGE UP (ref 22–31)
CREAT SERPL-MCNC: 2.2 MG/DL — HIGH (ref 0.5–1.3)
EGFR: 30 ML/MIN/1.73M2 — LOW
GLUCOSE SERPL-MCNC: 93 MG/DL — SIGNIFICANT CHANGE UP (ref 70–99)
HCT VFR BLD CALC: 30 % — LOW (ref 39–50)
HGB BLD-MCNC: 8.9 G/DL — LOW (ref 13–17)
MCHC RBC-ENTMCNC: 25.2 PG — LOW (ref 27–34)
MCHC RBC-ENTMCNC: 29.7 GM/DL — LOW (ref 32–36)
MCV RBC AUTO: 85 FL — SIGNIFICANT CHANGE UP (ref 80–100)
PLATELET # BLD AUTO: 251 K/UL — SIGNIFICANT CHANGE UP (ref 150–400)
POTASSIUM SERPL-MCNC: 3.9 MMOL/L — SIGNIFICANT CHANGE UP (ref 3.5–5.3)
POTASSIUM SERPL-SCNC: 3.9 MMOL/L — SIGNIFICANT CHANGE UP (ref 3.5–5.3)
PROT SERPL-MCNC: 6.9 GM/DL — SIGNIFICANT CHANGE UP (ref 6–8.3)
RBC # BLD: 3.53 M/UL — LOW (ref 4.2–5.8)
RBC # FLD: 17.3 % — HIGH (ref 10.3–14.5)
SODIUM SERPL-SCNC: 151 MMOL/L — HIGH (ref 135–145)
WBC # BLD: 10.31 K/UL — SIGNIFICANT CHANGE UP (ref 3.8–10.5)
WBC # FLD AUTO: 10.31 K/UL — SIGNIFICANT CHANGE UP (ref 3.8–10.5)

## 2023-09-07 PROCEDURE — 99239 HOSP IP/OBS DSCHRG MGMT >30: CPT

## 2023-09-07 PROCEDURE — 99223 1ST HOSP IP/OBS HIGH 75: CPT

## 2023-09-07 PROCEDURE — 99497 ADVNCD CARE PLAN 30 MIN: CPT | Mod: 25

## 2023-09-07 RX ORDER — DOCUSATE SODIUM 100 MG
1 CAPSULE ORAL
Refills: 0 | DISCHARGE

## 2023-09-07 RX ORDER — LACTULOSE 10 G/15ML
30 SOLUTION ORAL
Refills: 0 | DISCHARGE

## 2023-09-07 RX ORDER — SENNA PLUS 8.6 MG/1
2 TABLET ORAL
Refills: 0 | DISCHARGE

## 2023-09-07 RX ORDER — POLYETHYLENE GLYCOL 3350 17 G/17G
17 POWDER, FOR SOLUTION ORAL
Refills: 0 | DISCHARGE

## 2023-09-07 RX ORDER — TAMSULOSIN HYDROCHLORIDE 0.4 MG/1
1 CAPSULE ORAL
Refills: 0 | DISCHARGE

## 2023-09-07 RX ORDER — QUETIAPINE FUMARATE 200 MG/1
1 TABLET, FILM COATED ORAL
Refills: 0 | DISCHARGE

## 2023-09-07 RX ORDER — FERROUS SULFATE 325(65) MG
1 TABLET ORAL
Refills: 0 | DISCHARGE

## 2023-09-07 RX ORDER — ACETAMINOPHEN 500 MG
1 TABLET ORAL
Qty: 0 | Refills: 0 | DISCHARGE
Start: 2023-09-07

## 2023-09-07 RX ORDER — ASCORBIC ACID 60 MG
1 TABLET,CHEWABLE ORAL
Refills: 0 | DISCHARGE

## 2023-09-07 RX ORDER — MIRTAZAPINE 45 MG/1
1 TABLET, ORALLY DISINTEGRATING ORAL
Refills: 0 | DISCHARGE

## 2023-09-07 RX ORDER — CHOLECALCIFEROL (VITAMIN D3) 125 MCG
2 CAPSULE ORAL
Refills: 0 | DISCHARGE

## 2023-09-07 RX ORDER — SODIUM CHLORIDE 9 MG/ML
1000 INJECTION, SOLUTION INTRAVENOUS
Refills: 0 | Status: DISCONTINUED | OUTPATIENT
Start: 2023-09-07 | End: 2023-09-13

## 2023-09-07 RX ADMIN — SODIUM CHLORIDE 75 MILLILITER(S): 9 INJECTION, SOLUTION INTRAVENOUS at 04:58

## 2023-09-07 RX ADMIN — SODIUM CHLORIDE 75 MILLILITER(S): 9 INJECTION, SOLUTION INTRAVENOUS at 09:14

## 2023-09-07 RX ADMIN — Medication 650 MILLIGRAM(S): at 22:15

## 2023-09-07 RX ADMIN — CEFTRIAXONE 1000 MILLIGRAM(S): 500 INJECTION, POWDER, FOR SOLUTION INTRAMUSCULAR; INTRAVENOUS at 16:25

## 2023-09-07 RX ADMIN — HEPARIN SODIUM 5000 UNIT(S): 5000 INJECTION INTRAVENOUS; SUBCUTANEOUS at 22:26

## 2023-09-07 NOTE — SWALLOW BEDSIDE ASSESSMENT ADULT - MODE OF PRESENTATION
TOTAL ASSISTANCE IS NEEDED TO FEED. PT MUST BE IN AN ALERT STATE WHEN BEING FED WHICH IS NOT ALWAYS THE CASE

## 2023-09-07 NOTE — SWALLOW BEDSIDE ASSESSMENT ADULT - PHARYNGEAL PHASE
Swallow triggered in a grossly acceptable time frame for age and laryngeal lift on palpation during swallowing trials was felt to be functional for age as well. NO behavioral aspiration signs exhibited.

## 2023-09-07 NOTE — SWALLOW BEDSIDE ASSESSMENT ADULT - COMMENTS
Pt admitted to  from [facility] with lethargy, generalized deconditioning and failure to thrive. Hospital course notable for leukocytosis, UTI, ALEKSANDR, constipation and encephalopathy. Pt followed by Palliative Care. This profile is superimposed upon a history of Dementia with psychosis, depression, BPH. urinary retention with Lorenzo placement, anemia, C2 fracture s/p cervical spine surgery. Breath sounds clear and equal bilaterally.

## 2023-09-07 NOTE — SWALLOW BEDSIDE ASSESSMENT ADULT - SWALLOW EVAL: FEEDING ASSISTANCE
TOTAL ASSISTANCE IS NEEDED TO FEED. PT MUST BE IN AN ALERT STATE WHEN BEING FED WHICH IS NOT ALWAYS THE CASE.

## 2023-09-07 NOTE — SWALLOW BEDSIDE ASSESSMENT ADULT - ORAL PHASE
Bolus formation/transfer with minced/moist foods were excessively prolonged and discontinuous. Bolus formation/transfer with puree food/liquids were achieved via mildly to moderately prolonged/disorganized/discontinuous but grossly mechanically functional lingual pumping actions. Piecemeal deglutition was evident. Scattered tongue debris noted with minced/moist food./Decreased anterior-posterior movement of the bolus

## 2023-09-07 NOTE — DISCHARGE NOTE PROVIDER - CARE PROVIDER_API CALL
Sina Hilton  Internal Medicine  [address] [place] [address]  Phone: [phone]  Fax: [phone]  Follow Up Time:

## 2023-09-07 NOTE — SWALLOW BEDSIDE ASSESSMENT ADULT - ADDITIONAL RECOMMENDATIONS
HOSPITALIST, PALLIATIVE CARE AND NUTRITION F/U. PT IS AT LONG TERM NUTRITION RISK BUT I DO NOT FEEL THAT PLACEMENT OF A FEEDING TUBE WOULD ENHANCE HIS LIFE QUALITY GIVEN THE ADVANCEMENT OF HIS DEMENTIA/CHRONICITY OF HIS ORAL DYSPHAGIA.

## 2023-09-07 NOTE — DISCHARGE NOTE PROVIDER - HOSPITAL COURSE
79 y/o M w/ PMH of hypernatremia, depression / anxiety, psychosis, BPH, dementia, depression, C2 non-union chronic fracture, loose pedical screws from prior cervical surgery, dementia, p/w failure to thrive. Patient is non-verbal and unable to provide history. As per paperwork from facility, patient has not been eating / drinking, and also has not had a BM in the last 4 days.     pt admitted for UTI / metabolic encephalopathy and failure to thrive. pt was placed on IV rocephin. blood culture negative, urine culture with E coli. pt with large fecal load, placed on PO and rectal bowel regimen w/ small BMs. pt evaled by SLP - made NPO high aspiration risk. - SLP recss for Puree diet with thin liquids.   pt was followed by Nephro Dr. Rice for hypernatremia - placed on D5W with only mild improvement.   pt was evaled by Pall care team who spoke with pt brother - pt made DNR/ DNI with transition to Hospice at [facility].   wounds were dsg by RNs. vitals and labs were trended and corrected     # Sepsis/Metabolic Encephalopathy 2/2 dutton catheter UTI   - c/w ceftriaxone 1g o.d x 3 doses    - culture growing e.coli   - leukocytosis improving, 14.65 on arrival now 13.27   - lactate 1.7   - s/p IVF   - chronic dutton changed in ED on arrival -- maintain to gravity drainage     # ALEKSANDR on CKD IIIb/IV (eGFR 28 on arrival, now 31)    - pt baseline Cr 1.5 per nephro note 8/23/3023; Cr 2.34 on arrival   - s/p IVF   - avoid nephrotoxic agents   - nephrology consulted     # Decreased PO intake/constipation   - moderate stool burden on CT abd/pelvis  - s/p IVF  - nutrition consult  - lactulose, miralax, senna in house, ducolax suppository upon dc     # Hip ulcer  - wound care consult    # Anemia/Thrombocytosis   - Hgb 9.1, Hct 29.6   - Platelet count 406  - f/u with OP heme for further work up if family wishes to cont to pursue.     # Hypernatremia - poor PO intake   - trended Na+, 148 --- 151   - s/p D5W     # Hx of depression, anxiety, BPH, dementia  - was on mirtazapine, serqouel and tamsulosin, can attempt to administer but would not make priority as pt is high risk for aspriatin     # DVT ppx  - s/p LMWH subq b.i.d.       contacted brother Jasson - updated on of care. GOC - dc to Hospice     spent ___54_ mins preparing dc.   above plan discussed with pt, bedside RN, and MD Cerda 79 y/o M w/ PMH of hypernatremia, depression / anxiety, psychosis, BPH, dementia, depression, C2 non-union chronic fracture, loose pedical screws from prior cervical surgery, dementia, p/w failure to thrive. Patient is non-verbal and unable to provide history. As per paperwork from facility, patient has not been eating / drinking, and also has not had a BM in the last 4 days.     pt admitted for UTI / metabolic encephalopathy and failure to thrive. pt was placed on IV rocephin, swithced to Meropenum for 5 days. . blood culture negative, urine culture with E coli ESBL . pt with large fecal load, placed on PO and rectal bowel regimen w/ small BMs. pt evaled by SLP - made NPO high aspiration risk. - SLP recss for Puree diet with thin liquids.   pt was followed by Nephro Dr. Rice for hypernatremia - placed on D5W with only mild improvement.   pt was evaled by Pall care team who spoke with pt brother - pt made DNR/ DNI with transition to SNF   wounds were dsg by RNs. vitals and labs were trended and corrected     # Sepsis/Metabolic Encephalopathy 2/2 dutton catheter UTI   - completed ceftriaxone 1g o.d x 3 doses   kept on Meropenum daily for 5 days. completed course.   - culture growing e.coli ESBL   - leukocytosis improving, 14.65 on arrival now 13.27   - lactate 1.7   - s/p IVF   - chronic dutton changed in ED on arrival -- maintain to gravity drainage     # ALEKSANDR on CKD IIIb/IV (eGFR 28 on arrival, now 31)    - pt baseline Cr 1.5 per nephro note 8/23/3023; Cr 2.34 on arrival   - s/p IVF   - avoid nephrotoxic agents   - nephrology consulted     # Decreased PO intake/constipation   - moderate stool burden on CT abd/pelvis  - s/p IVF  - nutrition consult  - lactulose, miralax, senna in house, ducolax suppository upon dc     # Hip ulcer  - wound care consult    # Anemia/Thrombocytosis   - Hgb 9.1, Hct 29.6   - Platelet count 406  - f/u with OP heme for further work up if family wishes to cont to pursue.     # Hypernatremia - poor PO intake   - trended Na+, 148 --- 151   - s/p D5W     # Hx of depression, anxiety, BPH, dementia  - was on mirtazapine, serqouel and tamsulosin, can attempt to administer but would not make priority as pt is high risk for aspriatin     # DVT ppx  - s/p LMWH subq b.i.d.       contacted brother Jasson - updated on of care. GOC - dc to [place] [facility]     spent ___54_ mins preparing dc.   above plan discussed with pt, bedside RN, and MD Cerda 79 y/o M w/ PMH of hypernatremia, depression / anxiety, psychosis, BPH, dementia, depression, C2 non-union chronic fracture, loose pedical screws from prior cervical surgery, dementia, p/w failure to thrive. Patient is non-verbal and unable to provide history. As per paperwork from facility, patient has not been eating / drinking, and also has not had a BM in the last 4 days.     pt admitted for UTI / metabolic encephalopathy and failure to thrive. pt was placed on IV rocephin, swithced to Meropenum for 5 days. . blood culture negative, urine culture with E coli ESBL . pt with large fecal load, placed on PO and rectal bowel regimen w/ small BMs. pt evaled by SLP - made NPO high aspiration risk. - SLP recss for Puree diet with thin liquids.   pt was followed by Nephro Dr. Rice for hypernatremia - placed on D5W with only mild improvement.   pt was evaled by Pall care team who spoke with pt brother - pt made DNR/ DNI with transition to SNF   wounds were dsg by RNs. vitals and labs were trended and corrected     # Sepsis/Metabolic Encephalopathy 2/2 dutton catheter UTI   - completed ceftriaxone 1g o.d x 3 doses   kept on Meropenum daily for 5 days. completed course.   - culture growing e.coli ESBL   - leukocytosis improving, 14.65 on arrival now 13.27   - lactate 1.7   - s/p IVF   - chronic dutton changed in ED on arrival -- maintain to gravity drainage     # ALEKSANDR on CKD IIIb/IV (eGFR 28 on arrival, now 31)    - pt baseline Cr 1.5 per nephro note 8/23/3023; Cr 2.34 on arrival   - s/p IVF   - avoid nephrotoxic agents   - nephrology consulted     # Decreased PO intake/constipation   - moderate stool burden on CT abd/pelvis  - s/p IVF  - nutrition consult  - lactulose, miralax, senna in house, ducolax suppository upon dc     # Hip ulcer  - wound care consult    # Anemia/Thrombocytosis   - Hgb 9.1, Hct 29.6   - Platelet count 406  - f/u with OP heme for further work up if family wishes to cont to pursue.     # Hypernatremia - poor PO intake   - trended Na+, 148 --- 151   - s/p D5W     # Hx of depression, anxiety, BPH, dementia  - was on mirtazapine, serqouel and tamsulosin, can attempt to administer but would not make priority as pt is high risk for aspriatin     # DVT ppx  - s/p LMWH subq b.i.d.       contacted brother Jasson - updated on of care. GOC - dc to [place]     spent ___54_ mins preparing dc.   above plan discussed with pt, bedside RN, and MD Cerda     Attending Attestation:  Pt seen and examined with SAVANNAH Sharma. I personally had a face to face encounter with the patient, examined the patient myself and reviewed the plan of care with the patient and said SAVANNAH Sharma. I agree with the assessment and plan of SAVANNAH Sharma as stated and discussed.

## 2023-09-07 NOTE — SWALLOW BEDSIDE ASSESSMENT ADULT - SWALLOW EVAL: SECRETION MANAGEMENT
Unable to formally assess due to altered mentation, reduced task comprehension, decreased active participation and resistive mouth closing on stimulation. Note that no drooling noted upon limited observations.

## 2023-09-07 NOTE — DISCHARGE NOTE PROVIDER - NSDCMRMEDTOKEN_GEN_ALL_CORE_FT
acetaminophen 650 mg rectal suppository: 1 suppository(ies) rectal every 6 hours As needed Temp greater or equal to 38C (100.4F)   acetaminophen 650 mg rectal suppository: 1 suppository(ies) rectal every 6 hours As needed Temp greater or equal to 38C (100.4F)  ascorbic acid 500 mg oral tablet: 1 tab(s) orally 2 times a day  cholecalciferol oral tablet: 4000 unit(s) orally once a day  ferrous sulfate 325 mg (65 mg elemental iron) oral tablet: 1 tab(s) orally 2 times a day  lactulose 10 g/15 mL oral syrup: 30 milliliter(s) orally every 12 hours  mirtazapine 7.5 mg oral tablet: 1 tab(s) orally once a day (at bedtime)  polyethylene glycol 3350 oral powder for reconstitution: 17 gram(s) orally once a day (at bedtime)  QUEtiapine 25 mg oral tablet: 1 tab(s) orally once a day (at bedtime)  senna leaf extract oral tablet: 2 tab(s) orally once a day (at bedtime)  tamsulosin 0.4 mg oral capsule: 1 cap(s) orally once a day (at bedtime)

## 2023-09-07 NOTE — DISCHARGE NOTE PROVIDER - NSDCCPCAREPLAN_GEN_ALL_CORE_FT
PRINCIPAL DISCHARGE DIAGNOSIS  Diagnosis: Dementia  Assessment and Plan of Treatment: •Hospice is a service that is designed to provide people who are terminally ill and their families with medical, spiritual, and psychological support.  •Hospice aims to improve your quality of life by keeping you as comfortable as possible in the final stages of life.  •Hospice teams often include a doctor, nurse, , counselor, ,dietitian, therapists, and volunteers.  •Hospice care generally includes medicine for symptom management, visits from doctors and nurses, physical and occupational therapy, nutrition counseling, spiritual and emotional counseling, caregiver support, and bereavement support for grieving family members.  •Hospice programs can take place in your home or at a hospice center, hospital, or skilled nursing facility.      SECONDARY DISCHARGE DIAGNOSES  Diagnosis: Acute UTI  Assessment and Plan of Treatment:

## 2023-09-07 NOTE — SWALLOW BEDSIDE ASSESSMENT ADULT - SLP GENERAL OBSERVATIONS
On encounter, a prominent cervical kyphosis was apparent. A loss of bulk was apparent in strap muscle regions. The pt was awake but somewhat fatigued. His affect was flat. Pt was communicatively passive and internally distractible. Joint attention was brief/fleeting. Pt followed a limited number of 1 step verbal commands with no greater than 15% accuracy, despite gestural cues/repetition of stimuli. Additionally, the pt occasionally verbalized during communicative probes on very limited occasions. At these limited times, his verbal output was brief, typically unintelligible & contextually inappropriate when decipherable. The latter is indicative of prominent Cognitive Dysfunction in setting of encephalopathy associated w/acute illness(i.e UTI, ALEKSANDR, constipation, etc) as well as underlying Dementia. Most needs must be anticipated.

## 2023-09-07 NOTE — DISCHARGE NOTE PROVIDER - DETAILS OF MALNUTRITION DIAGNOSIS/DIAGNOSES
This patient has been assessed with a concern for Malnutrition and was treated during this hospitalization for the following Nutrition diagnosis/diagnoses:     -  09/06/2023: Severe protein-calorie malnutrition   -  09/06/2023: Underweight (BMI < 19)

## 2023-09-07 NOTE — CONSULT NOTE ADULT - CONVERSATION DETAILS
The role of palliative medicine was reviewed with the pt's brother who discussed his significant decline over the past several months. He is no longer recognizing his brother and has a decline PO intake as well. He currently resides at [facility] as he had lived with his brother and was cared for by him. We reviewed the MOLST and he requested a DNR/DNI/No feeding tube be placed. The old MOLST was voided and new one complete. We recommedned the support of hospice care at [facility] as he is likely nearing the end of lifre. Jasson was in agreement. SW to place referral. Supportive care given

## 2023-09-07 NOTE — DISCHARGE NOTE PROVIDER - NSDCCAREPROVSEEN_GEN_ALL_CORE_FT
Everette, Patricia Germain, Yokasta West, Lauren Jarrett, Pina Brown, Carmen Smith, Pina Montanez, Christos Cook, Lynne Sharma, Sendy Crane, Von Rice, Korey Joseph, Marcus COSTA

## 2023-09-07 NOTE — ADVANCED PRACTICE NURSE CONSULT - ASSESSMENT
This is a 78 year old male admitted on 9/6/23. As per physician H&P on 9/6/23 at 00:36 "77 y/o M w/ PMH of hypernatremia, depression / anxiety, psychosis, BPH, dementia, depression, C2 non-union chronic fracture, loose pedical screws from prior cervical surgery, dementia, p/w failure to thrive. Patient is non-verbal and unable to provide history. As per paperwork from facility, patient has not been eating / drinking, and also has not had a BM in the last 4 days."     Albumin: 2.0 g/dL  This patient has been evaluated by a Registered Dietician. Continue following current recommendations "1) C/w current PO diet and will add Ensure + HP shake BID to optimize nutritional needs (provides 350 kcal, 20g protein/ shake)   2) Encourage protein-rich foods, maximize food preferences  3) Monitor bowel movements, if no BM for >3 days, consider implementing bowel regimen.  4) Recommend to add MVI w/minerals, Vit C 500 mg BID, add Zinc Sulfate 220 mg x 10 days to promote wound healing. Also add Amrit BID (provides 90 kcal, 2.5 g collagen, 7 g L-Arginine, 7 g L-Glutamine/ 1 packet) to promote wound healing. Amrit is intended to support tissue building and collagen formation in the dietary management of wounds, which has been clinically shown to support wound healing (including pressure injuries, diabetic foot ulcers, surgical incisions, burns, and other acute/chronic wounds) by enhancing collagen formation in as little as 2 weeks.   5) Consider adding thiamine 200 mg daily 2/2 poor PO intake/ malnutrition   6) Monitor daily lytes/min and replete prn. consider checking B6, B12, thiamine, folate, carnitine, and copper levels as malnutrition in cause these to be deficient   7) Obtain weekly wt to track/trend changes. Monitor I&O  8) C/w appetite stimulant Remeron 2/2 chronically poor appetite/ PO intake   9) Maintain aspiration precautions, back of bed >45 degrees.   10) Goals of care confirmed; DNR/trial intubation/ NO TUBE FEED.  RD will continue to monitor PO intake, labs, hydration, and wt prn."    Rolly: 10  Continue to turn and reposition patient for offloading and preventative of pressure injury.     Patient in bed on 5 [address]. RN at the bedside for assessment. Noted multiple allevyn pad dressings on patient arms and legs. Noted a deep tissue injury on patient's right hip measuring 5.5cm x 6.5cm with no depth. The skin was dry and intact, nonblanchable and purple. Surrounding skin had slight blanchable erythema. A silicone foam dressing was applied. Assessed patient's left lower extremity and noted a partial thickness wound measuring 3cm x 0.7cm x 0.2cm on the shin. The wound bed was pale with yellow slough tissue. No drainage noted. The periwound is dry and intact. Recommend xeroform with an allevyn pad to left shin wound. Removed dressings from patient's left arm and noted a small partial thickness wound with pink granulation tissue. Small amount of non odorous serosanguinous drainage noted on dressing. Recommended triad to be applied to left arm wound.    This is a 78 year old male admitted on 9/6/23. As per physician H&P on 9/6/23 at 00:36 "79 y/o M w/ PMH of hypernatremia, depression / anxiety, psychosis, BPH, dementia, depression, C2 non-union chronic fracture, loose pedical screws from prior cervical surgery, dementia, p/w failure to thrive. Patient is non-verbal and unable to provide history. As per paperwork from facility, patient has not been eating / drinking, and also has not had a BM in the last 4 days."     Albumin: 2.0 g/dL  This patient has been evaluated by a Registered Dietician. Continue following current recommendations "1) C/w current PO diet and will add Ensure + HP shake BID to optimize nutritional needs (provides 350 kcal, 20g protein/ shake)   2) Encourage protein-rich foods, maximize food preferences  3) Monitor bowel movements, if no BM for >3 days, consider implementing bowel regimen.  4) Recommend to add MVI w/minerals, Vit C 500 mg BID, add Zinc Sulfate 220 mg x 10 days to promote wound healing. Also add Amrit BID (provides 90 kcal, 2.5 g collagen, 7 g L-Arginine, 7 g L-Glutamine/ 1 packet) to promote wound healing. Amrit is intended to support tissue building and collagen formation in the dietary management of wounds, which has been clinically shown to support wound healing (including pressure injuries, diabetic foot ulcers, surgical incisions, burns, and other acute/chronic wounds) by enhancing collagen formation in as little as 2 weeks.   5) Consider adding thiamine 200 mg daily 2/2 poor PO intake/ malnutrition   6) Monitor daily lytes/min and replete prn. consider checking B6, B12, thiamine, folate, carnitine, and copper levels as malnutrition in cause these to be deficient   7) Obtain weekly wt to track/trend changes. Monitor I&O  8) C/w appetite stimulant Remeron 2/2 chronically poor appetite/ PO intake   9) Maintain aspiration precautions, back of bed >45 degrees.   10) Goals of care confirmed; DNR/trial intubation/ NO TUBE FEED.  RD will continue to monitor PO intake, labs, hydration, and wt prn."    Rolly: 10  Continue to turn and reposition patient for offloading and preventative of pressure injury.     Patient in bed on 5 [address]. RN at the bedside for assessment. Noted multiple allevyn pad dressings on patient arms and legs. Noted a deep tissue injury on patient's right hip measuring 5.5cm x 6.5cm with no depth. The skin was dry and intact, nonblanchable and purple. Surrounding skin had slight blanchable erythema. A silicone foam dressing was applied. Assessed patient's left lower extremity and noted a full thickness wound with no exposed structures measuring 3cm x 0.7cm x 0.2cm on the shin. The wound bed was pale with yellow slough tissue. No drainage noted. The periwound is dry and intact. Recommend xeroform with an allevyn pad to left shin wound. Removed dressings from patient's left arm and noted a small partial thickness wound with pink granulation tissue. Small amount of non odorous serosanguinous drainage noted on dressing. Recommended triad to be applied to left arm wound.

## 2023-09-07 NOTE — SWALLOW BEDSIDE ASSESSMENT ADULT - SWALLOW EVAL: RECOMMENDED DIET
SUGGEST A PUREE TEXTURE DIET WITH THIN LIQUID CONSISTENCIES AS THIS IS THE LEAST RESTRICTIVE TOLERABLE DIET CONSISTENCIES FROM AN OROPHARYNGEAL SWALLOWING PERSPECTIVE ON EXAM WHEN HE IS ALERT/COGNIZANT ENOUGH TO BE FED. PT REQUIRES ASSISTANCE TO FEED AND MUST BE ALERT WHEN BEING FED.

## 2023-09-07 NOTE — SWALLOW BEDSIDE ASSESSMENT ADULT - SWALLOW EVAL: PROGNOSIS
2) Feeding integrity is compromised by a cervical kyphosis and altered cognition. This is atop Oral Dysphagia which subjectively appeared to be a grossly functional condition with a restricted inventory of modified food consistencies when he is alert/cognizant enough to be fed(inconsistent). His underlying pharyngeal integrity subjectively appeared to be grossly within functional parameters for age. NO behavioral aspiration signs exhibited. No change in O2 sats noted. Pt's feeding compromise/Oral Dysphagia are in setting of acute medical isssues(i.e UTI, ALEKSANDR, constipation, encephalopathy), cervical vertebral deformity, muscle wasting and underlying Dementia.

## 2023-09-07 NOTE — SWALLOW BEDSIDE ASSESSMENT ADULT - NS SPL SWALLOW CLINIC TRIAL FT
Feeding integrity is compromised by a cervical kyphosis and altered cognition. This is atop Oral Dysphagia which subjectively appeared to be a grossly functional condition with a restricted inventory of modified food consistencies when he is alert/cognizant enough to be fed(inconsistent). His underlying pharyngeal integrity subjectively appeared to be grossly within functional parameters for age. NO behavioral aspiration signs exhibited. No change in O2 sats noted. Pt's feeding compromise/Oral Dysphagia are in setting of acute medical isssues(i.e UTI, ALEKSANDR, constipation, encephalopathy), cervical vertebral deformity, muscle wasting and underlying Dementia. Coarse solids not offered given severity of Oral Dysphagia.

## 2023-09-07 NOTE — SWALLOW BEDSIDE ASSESSMENT ADULT - ORAL PREPARATORY PHASE
Pt was passive and inattentive when PO was offered. Oral aperture was decreased when accepting PO. Labial grading on utensils was functionally decreased.

## 2023-09-07 NOTE — CONSULT NOTE ADULT - TIME BILLING
Coordinating care, including chart/lab/test reviews as well as discussion regarding overall plan of care , advanced directives completed and disch planning.

## 2023-09-07 NOTE — PROGRESS NOTE ADULT - ASSESSMENT
77 yo male with hx hypernatremia, depression, Dementia , recent discharge with ALEKSANDR on CKD  NOW readmitted with poor po intake    ALEKSANDR on CKD - prerenal    Cr 1.5 in past    Cr improving with IVF      Hypernatremia - pt not eating     change to D5W     Dementia  - not eating calorie requirements and no plans for pEG      poor prognosis  - would consider hospice      * pt seen earlier, note now    Thank you for the courtesy of this consult. We will follow this patient with you.   Management is subject to change if new information becomes available or patient condition changes.

## 2023-09-07 NOTE — ADVANCED PRACTICE NURSE CONSULT - RECOMMEDATIONS
Right hip:  - Cleanse area with normal saline and pat dry.  - Apply a silicone foam dressing.  - To be changed daily and as needed if dressing becomes compromised.    Left shin:  - Cleanse with normal saline and pat dry.  - Apply a single layer of xeroform.  - Cover with a small silicone foam dressing.  - To be changed daily and as needed if dressing becomes compromised.    Left arm:  - Cleanse wound with normal saline and pat dry  - Cover wound with triad BID and PRN, it is ok to leave a thin layer of cream after cleansing this will not impede wound healing.    -Continue turning/positioning patient from side-to-side q2h while in bed, q1h when/if OOB chair, or in accordance w/ pt's plan of care. Utilize pillows and/or Spry positioner pillow to assist w/ turning/positioning. When/if OOB chair, utilize pillows or chair cushion to offload pressure.   -Continue to offload heels from bed surface with soft pillow under calfs or by applying offloading boots to BLEs.   -Continue applying Coloplast Leno Protect moisture barrier cream to buttock and perineal area daily and prn after each incontinent episode.    -Continue utilizing one underpad underneath patient to contain incontinence episodes; change pad when saturated/soiled.    -Continue nutrition consult for optimal wound healing & nutritional status.   -Assess skin/wound qshift, report changes to primary provider.     Plan of Care: Primary RN made aware of above recs. No further needs/recs from Wound care nurse service at this time. Staff RN to perform routine skin/wound assessment and manage wound care. Questions or concerns or if wound worsens and reconsult needed, please contact Wound care nurse.

## 2023-09-07 NOTE — DISCHARGE NOTE PROVIDER - NSDCPNSUBOBJ_GEN_ALL_CORE
unable to obtain ROS due to advanced dementia     Vital Signs Last 24 Hrs  T(C): 36.8 (07 Sep 2023 07:41), Max: 38 (06 Sep 2023 18:37)  T(F): 98.2 (07 Sep 2023 07:41), Max: 100.4 (06 Sep 2023 18:37)  HR: 99 (07 Sep 2023 07:41) (70 - 99)  BP: 115/75 (07 Sep 2023 07:41) (115/75 - 128/65)  BP(mean): --  RR: 18 (07 Sep 2023 07:41) (18 - 19)  SpO2: 94% (07 Sep 2023 07:41) (94% - 99%) room air                              8.9    10.31 )-----------( 251      ( 07 Sep 2023 07:48 )             30.0     09-07    151<H>  |  118<H>  |  34<H>  ----------------------------<  93  3.9   |  24  |  2.20<H>    Ca    9.1      07 Sep 2023 07:48  Mg     2.4     09-05    TPro  6.9  /  Alb  2.0<L>  /  TBili  0.4  /  DBili  x   /  AST  40<H>  /  ALT  25  /  AlkPhos  78  09-07        LIVER FUNCTIONS - ( 07 Sep 2023 07:48 )  Alb: 2.0 g/dL / Pro: 6.9 gm/dL / ALK PHOS: 78 U/L / ALT: 25 U/L / AST: 40 U/L / GGT: x           PT/INR - ( 06 Sep 2023 09:12 )   PT: 13.0 sec;   INR: 1.16 ratio         PTT - ( 06 Sep 2023 09:12 )  PTT:29.7 sec  Urinalysis Basic - ( 07 Sep 2023 07:48 )    Color: x / Appearance: x / SG: x / pH: x  Gluc: 93 mg/dL / Ketone: x  / Bili: x / Urobili: x   Blood: x / Protein: x / Nitrite: x   Leuk Esterase: x / RBC: x / WBC x   Sq Epi: x / Non Sq Epi: x / Bacteria: x        PHYSICAL EXAM:    Constitutional: uncomfortable appearing. awake, non-verbal. non-participatory in exam. frail appearing, contracted   HEENT: PERR, EOMI, dry mucous membranes, discharge present to BL conjunctiva   Neck: contracted. No JVD  Respiratory: Breath sounds diminished in bases bilaterally, No wheezing, rales or rhonchi  Cardiovascular: S1 and S2, regular rate and rhythm, no Murmurs, gallops or rubs  Gastrointestinal: Bowel Sounds present, soft, nontender, nondistended, no guarding, no rebound  Genitourinary: chronic dutton in place , dark yellow urine with sediment   Extremities: No peripheral edema  Vascular: 2+ peripheral pulses  Neurological: A/O x 0, non-verbal, no focal deficits  Musculoskeletal: unable to assess motor strength given pts inability to participate in physical examination   Skin: No rashes        RADIOLOGY/EKG:  < from: CT Abdomen and Pelvis No Cont (09.06.23 @ 07:57) >  IMPRESSION:  Moderate stool burden. No bowel obstruction.    Circumferential bladder wall thickening, nonspecific. Findings may be   secondary to chronic outlet obstruction versus cystitis. Correlate with   urinalysis.    Atrophic left kidney. No hydronephrosis.    --- End of Report ---  KERA LLOYD MD; Attending Radiologist  This document has been electronically signed. Sep  6 2023 11:17AM    < end of copied text >                                 unable to obtain ROS due to advanced dementia       Vital Signs Last 24 Hrs  T(C): 36.3 (13 Sep 2023 08:42), Max: 37.2 (12 Sep 2023 15:44)  T(F): 97.4 (13 Sep 2023 08:42), Max: 99 (12 Sep 2023 15:44)  HR: 73 (13 Sep 2023 08:42) (73 - 80)  BP: 111/58 (13 Sep 2023 08:42) (111/58 - 151/64)  BP(mean): --  RR: 18 (13 Sep 2023 08:42) (18 - 18)  SpO2: 99% (13 Sep 2023 08:42) (97% - 100%)    Parameters below as of 13 Sep 2023 08:42  Patient On (Oxygen Delivery Method): room air        10 Sep 2023 07:35    135    |  109    |  23     ----------------------------<  115    4.3     |  20     |  1.44     Ca    8.9        10 Sep 2023 07:35          CAPILLARY BLOOD GLUCOSE            Urinalysis Basic - ( 10 Sep 2023 07:35 )    Color: x / Appearance: x / SG: x / pH: x  Gluc: 115 mg/dL / Ketone: x  / Bili: x / Urobili: x   Blood: x / Protein: x / Nitrite: x   Leuk Esterase: x / RBC: x / WBC x   Sq Epi: x / Non Sq Epi: x / Bacteria: x          PHYSICAL EXAM:    Constitutional: uncomfortable appearing. awake, non-verbal. non-participatory in exam. frail appearing, contracted   HEENT: PERR, EOMI, dry mucous membranes, discharge present to BL conjunctiva   Neck: contracted. No JVD  Respiratory: Breath sounds diminished in bases bilaterally, No wheezing, rales or rhonchi  Cardiovascular: S1 and S2, regular rate and rhythm, no Murmurs, gallops or rubs  Gastrointestinal: Bowel Sounds present, soft, nontender, nondistended, no guarding, no rebound  Genitourinary: chronic dutton in place , dark yellow urine with sediment   Extremities: No peripheral edema  Vascular: 2+ peripheral pulses  Neurological: A/O x 0, non-verbal, no focal deficits  Musculoskeletal: unable to assess motor strength given pts inability to participate in physical examination   Skin: No rashes        RADIOLOGY/EKG:  < from: CT Abdomen and Pelvis No Cont (09.06.23 @ 07:57) >  IMPRESSION:  Moderate stool burden. No bowel obstruction.    Circumferential bladder wall thickening, nonspecific. Findings may be   secondary to chronic outlet obstruction versus cystitis. Correlate with   urinalysis.    Atrophic left kidney. No hydronephrosis.    --- End of Report ---  KERA LLOYD MD; Attending Radiologist  This document has been electronically signed. Sep  6 2023 11:17AM    < end of copied text >

## 2023-09-07 NOTE — SWALLOW BEDSIDE ASSESSMENT ADULT - ASR SWALLOW LABIAL MOBILITY
Unable to formally assess due to altered mentation, reduced task comprehension, decreased active participation and resistive mouth closing on stimulation.

## 2023-09-07 NOTE — SWALLOW BEDSIDE ASSESSMENT ADULT - SWALLOW EVAL: DIAGNOSIS
1) On encounter, a prominent cervical kyphosis was apparent. A loss of bulk was apparent in strap muscle regions. The pt was awake but somewhat fatigued. His affect was flat. Pt was communicatively passive and internally distractible. Joint attention was brief/fleeting. Pt followed a limited number of 1 step verbal commands with no greater than 15% accuracy, despite gestural cues/repetition of stimuli. Additionally, the pt occasionally verbalized during communicative probes on very limited occasions. At these limited times, his verbal output was brief, typically unintelligible & contextually inappropriate when decipherable. The latter is indicative of prominent Cognitive Dysfunction in setting of encephalopathy associated w/acute illness(i.e UTI, ALEKSANDR, constipation, etc) as well as underlying Dementia. Most needs must be anticipated.

## 2023-09-07 NOTE — PROGRESS NOTE ADULT - SUBJECTIVE AND OBJECTIVE BOX
79 y/o M w/ PMH of hypernatremia, depression / anxiety, psychosis, BPH, dementia, depression, C2 non-union chronic fracture, loose pedical screws from prior cervical surgery, dementia, p/w failure to thrive. Patient is non-verbal and unable to provide history. As per paperwork from facility, patient has not been eating / drinking, and also has not had a BM in the last 4 days.     previous nephrology note :  CKD with baseline near 1.5 mg/dl dementia, BPH, depression, and anemia   Cr sl improved from recent dishcarge  2.41     today    pt confused   nonverbal         MEDICATIONS  (STANDING):  ascorbic acid 500 milliGRAM(s) Oral two times a day  cefTRIAXone Injectable. 1000 milliGRAM(s) IV Push every 24 hours  cholecalciferol 4000 Unit(s) Oral daily  dextrose 5%. 1000 milliLiter(s) (75 mL/Hr) IV Continuous <Continuous>  ferrous    sulfate 325 milliGRAM(s) Oral two times a day  heparin   Injectable 5000 Unit(s) SubCutaneous every 12 hours  lactulose Syrup 20 Gram(s) Oral every 12 hours  mirtazapine 7.5 milliGRAM(s) Oral at bedtime  polyethylene glycol 3350 17 Gram(s) Oral at bedtime  QUEtiapine 25 milliGRAM(s) Oral at bedtime  senna 2 Tablet(s) Oral at bedtime  tamsulosin 0.4 milliGRAM(s) Oral at bedtime      Allergies    No Known Allergies    Intolerances        SOCIAL HISTORY:  Denies ETOh,Smoking,     FAMILY HISTORY:  No pertinent family history in first degree relatives        REVIEW OF SYSTEMS:    CONSTITUTIONAL: No weakness, fevers or chills  EYES/ENT: No visual changes;  No vertigo or throat pain   NECK: No pain or stiffness  RESPIRATORY: No cough, wheezing, hemoptysis; No shortness of breath  CARDIOVASCULAR: No chest pain or palpitations  GASTROINTESTINAL: No abdominal or epigastric pain. No nausea, vomiting, or hematemesis; No diarrhea or constipation. No melena or hematochezia.  GENITOURINARY: No dysuria, frequency or hematuria  NEUROLOGICAL: No numbness or weakness  SKIN: No itching, burning, rashes, or lesions   All other review of systems is negative unless indicated above.    Vital Signs Last 24 Hrs  T(C): 36.8 (07 Sep 2023 07:41), Max: 38 (06 Sep 2023 18:37)  T(F): 98.2 (07 Sep 2023 07:41), Max: 100.4 (06 Sep 2023 18:37)  HR: 99 (07 Sep 2023 07:41) (70 - 99)  BP: 115/75 (07 Sep 2023 07:41) (115/75 - 128/65)  BP(mean): --  RR: 18 (07 Sep 2023 07:41) (18 - 19)  SpO2: 94% (07 Sep 2023 07:41) (94% - 99%)    Parameters below as of 07 Sep 2023 07:41  Patient On (Oxygen Delivery Method): room air      I&O's Detail    06 Sep 2023 07:01  -  07 Sep 2023 07:00  --------------------------------------------------------  IN:  Total IN: 0 mL    OUT:    Indwelling Catheter - Urethral (mL): 300 mL  Total OUT: 300 mL    Total NET: -300 mL          Weight (kg): 61.6 (09-06 @ 01:00)    PHYSICAL EXAM:    Constitutional:   HEENT:  EOMI,  MM  Neck: No LAD, No JVD  Respiratory: CTAB  Cardiovascular: S1 and S2  Gastrointestinal: BS+, soft, NT/ND  Extremities: No peripheral edema  Neurological: Arousable, confused, nonverbal   : No Lorenzo  Skin: No rashes      LABS:                           151    |  118    |  34     ----------------------------<  93        07 Sep 2023 07:48  3.9     |  24     |  2.20     148    |  116    |  35     ----------------------------<  91        06 Sep 2023 09:12  3.7     |  26     |  2.16     146    |  115    |  34     ----------------------------<  100       05 Sep 2023 15:17  4.5     |  25     |  2.34     Ca    9.1        07 Sep 2023 07:48  Ca    8.5        06 Sep 2023 09:12      Mg     2.4       05 Sep 2023 15:17    TPro  6.9    /  Alb  2.0    /  TBili  0.4    /        07 Sep 2023 07:48  DBili  x      /  AST  40     /  ALT  25     /  AlkPhos  78       TPro  6.8    /  Alb  2.1    /  TBili  0.4    /        06 Sep 2023 09:12  DBili  x      /  AST  43     /  ALT  28     /  AlkPhos  84           Urine Studies:  Urinalysis Basic - ( 06 Sep 2023 09:12 )    Color: x / Appearance: x / SG: x / pH: x  Gluc: 91 mg/dL / Ketone: x  / Bili: x / Urobili: x   Blood: x / Protein: x / Nitrite: x   Leuk Esterase: x / RBC: x / WBC x   Sq Epi: x / Non Sq Epi: x / Bacteria: x            RADIOLOGY & ADDITIONAL STUDIES:

## 2023-09-08 LAB
-  AMIKACIN: SIGNIFICANT CHANGE UP
-  AMOXICILLIN/CLAVULANIC ACID: SIGNIFICANT CHANGE UP
-  AMPICILLIN/SULBACTAM: SIGNIFICANT CHANGE UP
-  AMPICILLIN: SIGNIFICANT CHANGE UP
-  AZTREONAM: SIGNIFICANT CHANGE UP
-  CEFAZOLIN: SIGNIFICANT CHANGE UP
-  CEFEPIME: SIGNIFICANT CHANGE UP
-  CEFTRIAXONE: SIGNIFICANT CHANGE UP
-  CEFUROXIME: SIGNIFICANT CHANGE UP
-  CIPROFLOXACIN: SIGNIFICANT CHANGE UP
-  ERTAPENEM: SIGNIFICANT CHANGE UP
-  GENTAMICIN: SIGNIFICANT CHANGE UP
-  IMIPENEM: SIGNIFICANT CHANGE UP
-  LEVOFLOXACIN: SIGNIFICANT CHANGE UP
-  MEROPENEM: SIGNIFICANT CHANGE UP
-  NITROFURANTOIN: SIGNIFICANT CHANGE UP
-  PIPERACILLIN/TAZOBACTAM: SIGNIFICANT CHANGE UP
-  TOBRAMYCIN: SIGNIFICANT CHANGE UP
-  TRIMETHOPRIM/SULFAMETHOXAZOLE: SIGNIFICANT CHANGE UP
ANION GAP SERPL CALC-SCNC: 6 MMOL/L — SIGNIFICANT CHANGE UP (ref 5–17)
BUN SERPL-MCNC: 31 MG/DL — HIGH (ref 7–23)
CALCIUM SERPL-MCNC: 8.9 MG/DL — SIGNIFICANT CHANGE UP (ref 8.5–10.1)
CHLORIDE SERPL-SCNC: 117 MMOL/L — HIGH (ref 96–108)
CO2 SERPL-SCNC: 25 MMOL/L — SIGNIFICANT CHANGE UP (ref 22–31)
CREAT SERPL-MCNC: 2.09 MG/DL — HIGH (ref 0.5–1.3)
CULTURE RESULTS: SIGNIFICANT CHANGE UP
EGFR: 32 ML/MIN/1.73M2 — LOW
GLUCOSE SERPL-MCNC: 107 MG/DL — HIGH (ref 70–99)
HCT VFR BLD CALC: 30.9 % — LOW (ref 39–50)
HGB BLD-MCNC: 9.6 G/DL — LOW (ref 13–17)
MCHC RBC-ENTMCNC: 26.2 PG — LOW (ref 27–34)
MCHC RBC-ENTMCNC: 31.1 GM/DL — LOW (ref 32–36)
MCV RBC AUTO: 84.4 FL — SIGNIFICANT CHANGE UP (ref 80–100)
METHOD TYPE: SIGNIFICANT CHANGE UP
ORGANISM # SPEC MICROSCOPIC CNT: SIGNIFICANT CHANGE UP
ORGANISM # SPEC MICROSCOPIC CNT: SIGNIFICANT CHANGE UP
PLATELET # BLD AUTO: 426 K/UL — HIGH (ref 150–400)
POTASSIUM SERPL-MCNC: 4 MMOL/L — SIGNIFICANT CHANGE UP (ref 3.5–5.3)
POTASSIUM SERPL-SCNC: 4 MMOL/L — SIGNIFICANT CHANGE UP (ref 3.5–5.3)
RBC # BLD: 3.66 M/UL — LOW (ref 4.2–5.8)
RBC # FLD: 17.2 % — HIGH (ref 10.3–14.5)
SODIUM SERPL-SCNC: 148 MMOL/L — HIGH (ref 135–145)
SPECIMEN SOURCE: SIGNIFICANT CHANGE UP
WBC # BLD: 10.63 K/UL — HIGH (ref 3.8–10.5)
WBC # FLD AUTO: 10.63 K/UL — HIGH (ref 3.8–10.5)

## 2023-09-08 PROCEDURE — 99232 SBSQ HOSP IP/OBS MODERATE 35: CPT

## 2023-09-08 RX ORDER — MEROPENEM 1 G/30ML
1000 INJECTION INTRAVENOUS EVERY 12 HOURS
Refills: 0 | Status: COMPLETED | OUTPATIENT
Start: 2023-09-08 | End: 2023-09-13

## 2023-09-08 RX ORDER — MEROPENEM 1 G/30ML
1000 INJECTION INTRAVENOUS EVERY 12 HOURS
Refills: 0 | Status: COMPLETED | OUTPATIENT
Start: 2023-09-08 | End: 2023-09-08

## 2023-09-08 RX ORDER — MEROPENEM 1 G/30ML
1000 INJECTION INTRAVENOUS EVERY 12 HOURS
Refills: 0 | Status: DISCONTINUED | OUTPATIENT
Start: 2023-09-08 | End: 2023-09-08

## 2023-09-08 RX ADMIN — Medication 650 MILLIGRAM(S): at 10:01

## 2023-09-08 RX ADMIN — HEPARIN SODIUM 5000 UNIT(S): 5000 INJECTION INTRAVENOUS; SUBCUTANEOUS at 22:32

## 2023-09-08 RX ADMIN — Medication 650 MILLIGRAM(S): at 16:53

## 2023-09-08 RX ADMIN — Medication 650 MILLIGRAM(S): at 05:52

## 2023-09-08 RX ADMIN — MEROPENEM 1000 MILLIGRAM(S): 1 INJECTION INTRAVENOUS at 22:31

## 2023-09-08 RX ADMIN — SODIUM CHLORIDE 75 MILLILITER(S): 9 INJECTION, SOLUTION INTRAVENOUS at 04:35

## 2023-09-08 RX ADMIN — MEROPENEM 1000 MILLIGRAM(S): 1 INJECTION INTRAVENOUS at 13:25

## 2023-09-08 RX ADMIN — Medication 650 MILLIGRAM(S): at 07:40

## 2023-09-08 RX ADMIN — SODIUM CHLORIDE 75 MILLILITER(S): 9 INJECTION, SOLUTION INTRAVENOUS at 22:31

## 2023-09-08 NOTE — CONSULT NOTE ADULT - SUBJECTIVE AND OBJECTIVE BOX
79 y/o M w/ PMH of hypernatremia, depression / anxiety, psychosis, BPH, dementia, depression, C2 non-union chronic fracture, loose pedical screws from prior cervical surgery, dementia, p/w failure to thrive. Patient is non-verbal and unable to provide history. As per paperwork from facility, patient has not been eating / drinking, and also has not had a BM in the last 4 days.     previous nephrology note :  CKD with baseline near 1.5 mg/dl dementia, BPH, depression, and anemia   Cr sl improved from recent dishcarge  2.41     today    pt confused   nonverbal         MEDICATIONS  (STANDING):  ascorbic acid 500 milliGRAM(s) Oral two times a day  cefTRIAXone Injectable. 1000 milliGRAM(s) IV Push every 24 hours  cholecalciferol 4000 Unit(s) Oral daily  ferrous    sulfate 325 milliGRAM(s) Oral two times a day  heparin   Injectable 5000 Unit(s) SubCutaneous every 12 hours  lactulose Syrup 20 Gram(s) Oral every 12 hours  mirtazapine 7.5 milliGRAM(s) Oral at bedtime  polyethylene glycol 3350 17 Gram(s) Oral at bedtime  QUEtiapine 25 milliGRAM(s) Oral at bedtime  senna 2 Tablet(s) Oral at bedtime  sodium chloride 0.45%. 1000 milliLiter(s) (75 mL/Hr) IV Continuous <Continuous>  tamsulosin 0.4 milliGRAM(s) Oral at bedtime      Allergies    No Known Allergies    Intolerances        SOCIAL HISTORY:  Denies ETOh,Smoking,     FAMILY HISTORY:  No pertinent family history in first degree relatives        REVIEW OF SYSTEMS:    CONSTITUTIONAL: No weakness, fevers or chills  EYES/ENT: No visual changes;  No vertigo or throat pain   NECK: No pain or stiffness  RESPIRATORY: No cough, wheezing, hemoptysis; No shortness of breath  CARDIOVASCULAR: No chest pain or palpitations  GASTROINTESTINAL: No abdominal or epigastric pain. No nausea, vomiting, or hematemesis; No diarrhea or constipation. No melena or hematochezia.  GENITOURINARY: No dysuria, frequency or hematuria  NEUROLOGICAL: No numbness or weakness  SKIN: No itching, burning, rashes, or lesions   All other review of systems is negative unless indicated above.    VITAL:  T(C): 38 (09-06-23), Max: 38 (09-06-23)  T(F): 100.4 (09-06-23), Max: 100.4 (09-06-23)  HR: 84 (09-06-23) (84 - 96)  BP: 124/66 (09-06-23) (108/61 - 131/64)  RR: 19 (09-06-23)  SpO2: 97% (09-06-23) (95% - 100%)    I and O's:    09-05 @ 07:01  -  09-06 @ 07:00  --------------------------------------------------------  IN: 0 mL / OUT: 400 mL / NET: -400 mL        Weight (kg): 61.6 (09-06 @ 01:00)    PHYSICAL EXAM:    Constitutional:   HEENT:  EOMI,  MM  Neck: No LAD, No JVD  Respiratory: CTAB  Cardiovascular: S1 and S2  Gastrointestinal: BS+, soft, NT/ND  Extremities: No peripheral edema  Neurological: Arousable, confused, nonverbal   : No Lorenzo  Skin: No rashes      LABS:                          9.1    13.27 )-----------( 406      ( 06 Sep 2023 09:12 )             29.6                         11.1   14.65 )-----------( 501      ( 05 Sep 2023 15:17 )             37.5       148    |  116    |  35     ----------------------------<  91        06 Sep 2023 09:12  3.7     |  26     |  2.16     146    |  115    |  34     ----------------------------<  100       05 Sep 2023 15:17  4.5     |  25     |  2.34     Ca    8.5        06 Sep 2023 09:12  Ca    9.1        05 Sep 2023 15:17      Mg     2.4       05 Sep 2023 15:17    TPro  6.8    /  Alb  2.1    /  TBili  0.4    /        06 Sep 2023 09:12  DBili  x      /  AST  43     /  ALT  28     /  AlkPhos  84       TPro  7.9    /  Alb  2.5    /  TBili  0.4    /        05 Sep 2023 15:17  DBili  x      /  AST  55     /  ALT  35     /  AlkPhos  93           Urine Studies:  Urinalysis Basic - ( 06 Sep 2023 09:12 )    Color: x / Appearance: x / SG: x / pH: x  Gluc: 91 mg/dL / Ketone: x  / Bili: x / Urobili: x   Blood: x / Protein: x / Nitrite: x   Leuk Esterase: x / RBC: x / WBC x   Sq Epi: x / Non Sq Epi: x / Bacteria: x            RADIOLOGY & ADDITIONAL STUDIES:                  
Patient is a 78y old  Male who presents with a chief complaint of failure to thrive (07 Sep 2023 14:36)    HPI:  79 y/o M w/ PMH of hypernatremia, depression / anxiety, psychosis, BPH, dementia, depression, C2 non-union chronic fracture, loose pedical screws from prior cervical surgery, dementia, p/w failure to thrive. Patient is non-verbal and unable to provide history. As per paperwork from facility, patient has not been eating / drinking, and also has not had a BM in the last 4 days. Here noted with fevers, imaging shows Circumferential bladder wall thickening, nonspecific. Findings may be secondary to chronic outlet obstruction versus cystitis. Bibasilar atelectasis. Urine cx growing ESBL Ecoli, given rocephin initially, abx changed to meropenem     Atrophic left kidney. No hydronephrosis.    PSH: C-spine surgery   PMH: as above    Meds: per reconciliation sheet, noted below  MEDICATIONS  (STANDING):  ascorbic acid 500 milliGRAM(s) Oral two times a day  cholecalciferol 4000 Unit(s) Oral daily  dextrose 5%. 1000 milliLiter(s) (75 mL/Hr) IV Continuous <Continuous>  ferrous    sulfate 325 milliGRAM(s) Oral two times a day  heparin   Injectable 5000 Unit(s) SubCutaneous every 12 hours  lactulose Syrup 20 Gram(s) Oral every 12 hours  meropenem Injectable 1000 milliGRAM(s) IV Push every 12 hours  meropenem Injectable 1000 milliGRAM(s) IV Push every 12 hours  mirtazapine 7.5 milliGRAM(s) Oral at bedtime  polyethylene glycol 3350 17 Gram(s) Oral at bedtime  QUEtiapine 25 milliGRAM(s) Oral at bedtime  senna 2 Tablet(s) Oral at bedtime  tamsulosin 0.4 milliGRAM(s) Oral at bedtime    Allergies    No Known Allergies    Intolerances      Social: no smoking, no alcohol, no illegal drugs; no recent travel, no exposure to TB  FAMILY HISTORY:  No pertinent family history in first degree relatives       no history of premature cardiovascular disease in first degree relatives    ROS: unable to obtain d/t medical condition     All other systems reviewed and are negative    Vital Signs Last 24 Hrs  T(C): 38.1 (08 Sep 2023 10:00), Max: 38.3 (08 Sep 2023 05:52)  T(F): 100.5 (08 Sep 2023 10:00), Max: 101 (08 Sep 2023 05:52)  HR: 96 (08 Sep 2023 07:35) (89 - 97)  BP: 113/54 (08 Sep 2023 07:35) (113/54 - 153/66)  BP(mean): --  RR: 18 (08 Sep 2023 07:35) (18 - 18)  SpO2: 97% (08 Sep 2023 07:35) (96% - 97%)    Parameters below as of 08 Sep 2023 07:35  Patient On (Oxygen Delivery Method): room air      Daily     Daily     PE:  Constitutional: frail looking  HEENT: NC/AT, EOMI, PERRLA, conjunctivae clear; ears and nose atraumatic; pharynx benign  Neck: supple; thyroid not palpable  Back: no tenderness  Respiratory: decreased breath sounds, rhonchi   Cardiovascular: S1S2 regular, no murmurs  Abdomen: soft, not tender, not distended, positive BS; liver and spleen WNL  Genitourinary: no suprapubic tenderness  Lymphatic: no LN palpable  Musculoskeletal: no muscle tenderness, no joint swelling or tenderness  Extremities: no pedal edema  Neurological/ Psychiatric:  moving all extremities  Skin: no rashes; no palpable lesions    Labs: all available labs reviewed                        9.6    10.63 )-----------( 426      ( 08 Sep 2023 09:04 )             30.9     09-08    148<H>  |  117<H>  |  31<H>  ----------------------------<  107<H>  4.0   |  25  |  2.09<H>    Ca    8.9      08 Sep 2023 09:04    TPro  6.9  /  Alb  2.0<L>  /  TBili  0.4  /  DBili  x   /  AST  40<H>  /  ALT  25  /  AlkPhos  78  09-07     LIVER FUNCTIONS - ( 07 Sep 2023 07:48 )  Alb: 2.0 g/dL / Pro: 6.9 gm/dL / ALK PHOS: 78 U/L / ALT: 25 U/L / AST: 40 U/L / GGT: x           Urinalysis Basic - ( 08 Sep 2023 09:04 )    Color: x / Appearance: x / SG: x / pH: x  Gluc: 107 mg/dL / Ketone: x  / Bili: x / Urobili: x   Blood: x / Protein: x / Nitrite: x   Leuk Esterase: x / RBC: x / WBC x   Sq Epi: x / Non Sq Epi: x / Bacteria: x          Radiology: all available radiological tests reviewed  < from: CT Abdomen and Pelvis No Cont (09.06.23 @ 07:57) >  ACC: [id number] EXAM:  CT ABDOMEN AND PELVIS   ORDERED BY: KIERA VANCE     PROCEDURE DATE:  09/06/2023          INTERPRETATION:  CLINICAL INFORMATION: Decreased oral intake.   Constipation.    COMPARISON: CT chest/abdomen/pelvis 8/19/2023.    CONTRAST/COMPLICATIONS:  IV Contrast: NONE  Oral Contrast: NONE  Complications: None reported at time of study completion    PROCEDURE:  CT of the Abdomen and Pelvis was performed.  Sagittal and coronal reformats were performed.    FINDINGS:    Evaluation of the parenchymal organs and vascular structures is limited   without intravenous contrast.    LOWER CHEST: Bibasilar segmental atelectasis. Coronary artery   calcifications.    LIVER: Within normal limits.  BILE DUCTS: Normal caliber.  GALLBLADDER: Within normal limits.  SPLEEN: Within normal limits.  PANCREAS: Within normal limits.  ADRENALS: Within normal limits.  KIDNEYS/URETERS: Atrophic left kidney. No hydronephrosis.    BLADDER: Indwelling Lorenzo balloon catheter. Circumferential wall   thickening, nonspecific.  REPRODUCTIVE ORGANS: Prostate is enlarged.    BOWEL: No bowel obstruction. Moderate stool burden. Appendix is not   visualized. No evidence of inflammation in the pericecal region.  PERITONEUM: No ascites.  VESSELS: Atherosclerotic changes.  RETROPERITONEUM/LYMPH NODES: No lymphadenopathy.  ABDOMINAL WALL: Within normal limits.  BONES: Degenerative changes. Generalized demineralization. Chronic   bilateral rib fractures. Chronic left inferior pubic ramus fracture.    IMPRESSION:  Moderate stool burden. No bowel obstruction.    Circumferential bladder wall thickening, nonspecific. Findings may be   secondary to chronic outlet obstruction versus cystitis. Correlate with   urinalysis.    Atrophic left kidney. No hydronephrosis.    Advanced directives addressed: full resuscitation
  HPI: Pt is a 78y old Male with a with PMHx of hypernatremia, depression, anxiety, psychosis, BPH, dementia, depression, chronic dutton, C2 non-union chronic fracture, and loose pedical screws from prior cervical surgery presents to ED from [facility] for failure to thrive. Patient is non-verbal at baseline, unable to provide history. As per paperwork from facility, patient has not been eating/drinking, and has not had a BM in the last 4 days. ED course: blood cxs, urine cxs completed. + UTI on UA. + leukocytosis 14.65. NS fluid bolus completed. Ceftriaxone 1g IVP o.d. initiated. admitted for UTI, FTT, constipation     9/7/23 Seen and examined at bedside. Lethargic and unresponsive. Appears in no acute distress     PAIN: ( )Yes   ( )No  No non verbal signs or symptoms    DYSPNEA: ( ) Yes  (X ) No    PAST MEDICAL & SURGICAL HISTORY:  Anemia  Depression  BPH (benign prostatic hyperplasia)  Dementia  H/O cervical spine surgery    SOCIAL HX:  Lives at [facility]  Hx opiate tolerance ( )YES  (X )NO    Baseline ADLs  (Prior to Admission)  ( ) Independent   (X )Dependent    FAMILY HISTORY:  Unable to obtain due to dementia    Review of Systems:  Unable to obtain/Limited due to: dementia      PHYSICAL EXAM:    Vital Signs Last 24 Hrs  T(C): 36.8 (07 Sep 2023 07:41), Max: 38 (06 Sep 2023 18:37)  T(F): 98.2 (07 Sep 2023 07:41), Max: 100.4 (06 Sep 2023 18:37)  HR: 99 (07 Sep 2023 07:41) (70 - 99)  BP: 115/75 (07 Sep 2023 07:41) (115/75 - 128/65)  RR: 18 (07 Sep 2023 07:41) (18 - 19)  SpO2: 94% (07 Sep 2023 07:41) (94% - 99%)    Parameters below as of 07 Sep 2023 07:41  Patient On (Oxygen Delivery Method): room air      PPSV2: 10-20  %  FAST: 7C    General: Elderly male in bed in NAD  Mental Status: Unresponsive to voice  HEENT: oral mucosa dry/+ temp wasting  Lungs: clear to auscultation jerrod  Cardiac: S1S2+  GI: Abd soft/NT ND + BS  : dutton  Ext: contractures X4  Neuro: unresponsive      LABS:                        8.9    10.31 )-----------( 251      ( 07 Sep 2023 07:48 )             30.0     09-07    151<H>  |  118<H>  |  34<H>  ----------------------------<  93  3.9   |  24  |  2.20<H>    Ca    9.1      07 Sep 2023 07:48  Mg     2.4     09-05    TPro  6.9  /  Alb  2.0<L>  /  TBili  0.4  /  DBili  x   /  AST  40<H>  /  ALT  25  /  AlkPhos  78  09-07    PT/INR - ( 06 Sep 2023 09:12 )   PT: 13.0 sec;   INR: 1.16 ratio         PTT - ( 06 Sep 2023 09:12 )  PTT:29.7 sec  Albumin: Albumin: 2.0 g/dL (09-07 @ 07:48)      Allergies    No Known Allergies    Intolerances      MEDICATIONS  (STANDING):  ascorbic acid 500 milliGRAM(s) Oral two times a day  cefTRIAXone Injectable. 1000 milliGRAM(s) IV Push every 24 hours  cholecalciferol 4000 Unit(s) Oral daily  dextrose 5%. 1000 milliLiter(s) (75 mL/Hr) IV Continuous <Continuous>  ferrous    sulfate 325 milliGRAM(s) Oral two times a day  heparin   Injectable 5000 Unit(s) SubCutaneous every 12 hours  lactulose Syrup 20 Gram(s) Oral every 12 hours  mirtazapine 7.5 milliGRAM(s) Oral at bedtime  polyethylene glycol 3350 17 Gram(s) Oral at bedtime  QUEtiapine 25 milliGRAM(s) Oral at bedtime  senna 2 Tablet(s) Oral at bedtime  tamsulosin 0.4 milliGRAM(s) Oral at bedtime    MEDICATIONS  (PRN):  acetaminophen  Suppository .. 650 milliGRAM(s) Rectal every 6 hours PRN Temp greater or equal to 38C (100.4F)      RADIOLOGY/ADDITIONAL STUDIES:  < from: CT Abdomen and Pelvis No Cont (09.06.23 @ 07:57) >    ACC: [id number] EXAM:  CT ABDOMEN AND PELVIS   ORDERED BY: KIERA VANCE     PROCEDURE DATE:  09/06/2023          INTERPRETATION:  CLINICAL INFORMATION: Decreased oral intake.   Constipation.    COMPARISON: CT chest/abdomen/pelvis 8/19/2023.    CONTRAST/COMPLICATIONS:  IV Contrast: NONE  Oral Contrast: NONE  Complications: None reported at time of study completion    PROCEDURE:  CT of the Abdomen and Pelvis was performed.  Sagittal and coronal reformats were performed.    FINDINGS:    Evaluation of the parenchymal organs and vascular structures is limited   without intravenous contrast.    LOWER CHEST: Bibasilar segmental atelectasis. Coronary artery   calcifications.    LIVER: Within normal limits.  BILE DUCTS: Normal caliber.  GALLBLADDER: Within normal limits.  SPLEEN: Within normal limits.  PANCREAS: Within normal limits.  ADRENALS: Within normal limits.  KIDNEYS/URETERS: Atrophic left kidney. No hydronephrosis.    BLADDER: Indwelling Dutton balloon catheter. Circumferential wall   thickening, nonspecific.  REPRODUCTIVE ORGANS: Prostate is enlarged.    BOWEL: No bowel obstruction. Moderate stool burden. Appendix is not   visualized. No evidence of inflammation in the pericecal region.  PERITONEUM: No ascites.  VESSELS: Atherosclerotic changes.  RETROPERITONEUM/LYMPH NODES: No lymphadenopathy.  ABDOMINAL WALL: Within normal limits.  BONES: Degenerative changes. Generalized demineralization. Chronic   bilateral rib fractures. Chronic left inferior pubic ramus fracture.    IMPRESSION:  Moderate stool burden. No bowel obstruction.    Circumferential bladder wall thickening, nonspecific. Findings may be   secondary to chronic outlet obstruction versus cystitis. Correlate with   urinalysis.    Atrophic left kidney. No hydronephrosis.

## 2023-09-08 NOTE — PROGRESS NOTE ADULT - SUBJECTIVE AND OBJECTIVE BOX
SUBJECTIVE:   HPI: 78-year-old M with PMHx hypernatremia, depression, anxiety, psychosis, BPH, dementia, depression, chronic dutton, C2 non-union chronic fracture, and loose pedical screws from prior cervical surgery presents to ED from [facility] for failure to thrive. Patient is non-verbal at baseline, unable to provide history. As per paperwork from facility, patient has not been eating/drinking, and has not had a BM in the last 4 days.     ED course: blood cxs, urine cxs completed. + UTI on UA. + leukocytosis 14.65. NS fluid bolus completed. Ceftriaxone 1g IVP o.d. initiated. admitted for UTI, FTT, constipation     9/6: pt examined sitting up in bed, appearing uncomfortable. pt non-verbal, unable to participate in exam. pt contracted and frail appearing.   9/8: dc yesterday cancelled. pt w/ esbl, changed abx to meropenum. pt overall unchanged.     REVIEW OF SYSTEMS: unable to obtain 2/2 dementia, pt non-verbal.     Height (cm): 175.3 (09-05 @ 14:13)  Weight (kg): 61.6 (09-06 @ 01:00)  BMI (kg/m2): 20 (09-06 @ 01:00)  BSA (m2): 1.75 (09-06 @ 01:00)    Vital Signs Last 24 Hrs  T(C): 38.1 (08 Sep 2023 10:00), Max: 38.3 (08 Sep 2023 05:52)  T(F): 100.5 (08 Sep 2023 10:00), Max: 101 (08 Sep 2023 05:52)  HR: 96 (08 Sep 2023 07:35) (89 - 97)  BP: 113/54 (08 Sep 2023 07:35) (113/54 - 153/66)  BP(mean): --  RR: 18 (08 Sep 2023 07:35) (18 - 18)  SpO2: 97% (08 Sep 2023 07:35) (96% - 97%)    Parameters below as of 08 Sep 2023 07:35  Patient On (Oxygen Delivery Method): room air        I&O's Summary    05 Sep 2023 07:01  -  06 Sep 2023 07:00  --------------------------------------------------------  IN: 0 mL / OUT: 400 mL / NET: -400 mL        PHYSICAL EXAM:    Constitutional: comfortable appearing. awake, non-verbal. non-participatory in exam. frail appearing, contracted   HEENT: PERR, EOMI, dry mucous membranes, discharge present to BL conjunctiva   Neck: contracted. No JVD  Respiratory: Breath sounds diminished in bases bilaterally, No wheezing, rales or rhonchi  Cardiovascular: S1 and S2, regular rate and rhythm, no Murmurs, gallops or rubs  Gastrointestinal: Bowel Sounds present, soft, nontender, nondistended, no guarding, no rebound  Genitourinary: chronic dutton in place , dark yellow urine with sediment   Extremities: No peripheral edema  Vascular: 2+ peripheral pulses  Neurological: A/O x 0, non-verbal, no focal deficits  Musculoskeletal: unable to assess motor strength given pts inability to participate in physical examination   Skin: pt w/  a deep tissue injury on patient's right hip measuring 5.5cm x 6.5cm with no depth.      MEDICATIONS:  MEDICATIONS  (STANDING):  ascorbic acid 500 milliGRAM(s) Oral two times a day  cefTRIAXone Injectable. 1000 milliGRAM(s) IV Push every 24 hours  cholecalciferol 4000 Unit(s) Oral daily  ferrous    sulfate 325 milliGRAM(s) Oral two times a day  heparin   Injectable 5000 Unit(s) SubCutaneous every 12 hours  lactulose Syrup 20 Gram(s) Oral every 12 hours  mirtazapine 7.5 milliGRAM(s) Oral at bedtime  polyethylene glycol 3350 17 Gram(s) Oral at bedtime  QUEtiapine 25 milliGRAM(s) Oral at bedtime  senna 2 Tablet(s) Oral at bedtime  sodium chloride 0.45%. 1000 milliLiter(s) (75 mL/Hr) IV Continuous <Continuous>  tamsulosin 0.4 milliGRAM(s) Oral at bedtime      LABS: All Labs Reviewed:                        9.1    13.27 )-----------( 406      ( 06 Sep 2023 09:12 )             29.6     09-06    148<H>  |  116<H>  |  35<H>  ----------------------------<  91  3.7   |  26  |  2.16<H>    Ca    8.5      06 Sep 2023 09:12  Mg     2.4     09-05    TPro  6.8  /  Alb  2.1<L>  /  TBili  0.4  /  DBili  x   /  AST  43<H>  /  ALT  28  /  AlkPhos  84  09-06    PT/INR - ( 06 Sep 2023 09:12 )   PT: 13.0 sec;   INR: 1.16 ratio         PTT - ( 06 Sep 2023 09:12 )  PTT:29.7 sec        Urinalysis (09.05.23 @ 15:17)   Glucose Qualitative, Urine: Negative   Blood, Urine: Moderate   pH Urine: 5.0   Color: Yellow   Urine Appearance: very cloudy   Bilirubin: Negative   Ketone - Urine: Negative   Specific Gravity: 1.020   Protein, Urine: 100   Urobilinogen: Negative   Nitrite: Positive   Leukocyte Esterase Concentration: Moderate      Blood Culture: pending  Urine Culture: pending      RADIOLOGY/EKG:  < from: CT Abdomen and Pelvis No Cont (09.06.23 @ 07:57) >  IMPRESSION:  Moderate stool burden. No bowel obstruction.    Circumferential bladder wall thickening, nonspecific. Findings may be   secondary to chronic outlet obstruction versus cystitis. Correlate with   urinalysis.    Atrophic left kidney. No hydronephrosis.          --- End of Report ---      KERA LLOYD MD; Attending Radiologist  This document has been electronically signed. Sep  6 2023 11:17AM    < end of copied text >

## 2023-09-08 NOTE — PROGRESS NOTE ADULT - ASSESSMENT
79 yo male with hx hypernatremia, depression, Dementia , recent discharge with ALEKSANDR on CKD  NOW readmitted with poor po intake    ALEKSANDR on CKD - prerenal    Cr 1.5 in past    Cr improving with IVF      Hypernatremia - pt not eating     continue D5W     Dementia  - not eating calorie requirements and no plans for pEG per family      poor prognosis  - consider hospice      * pt seen earlier, note now

## 2023-09-08 NOTE — PROGRESS NOTE ADULT - SUBJECTIVE AND OBJECTIVE BOX
Patient is a 78y Male   confused at [facility]     REVIEW OF SYSTEMS:  UTO   Allergies    No Known Allergies    Intolerances        MEDICATIONS  (STANDING):  ascorbic acid 500 milliGRAM(s) Oral two times a day  cholecalciferol 4000 Unit(s) Oral daily  dextrose 5%. 1000 milliLiter(s) (75 mL/Hr) IV Continuous <Continuous>  ferrous    sulfate 325 milliGRAM(s) Oral two times a day  heparin   Injectable 5000 Unit(s) SubCutaneous every 12 hours  lactulose Syrup 20 Gram(s) Oral every 12 hours  meropenem Injectable 1000 milliGRAM(s) IV Push every 12 hours  mirtazapine 7.5 milliGRAM(s) Oral at bedtime  polyethylene glycol 3350 17 Gram(s) Oral at bedtime  QUEtiapine 25 milliGRAM(s) Oral at bedtime  senna 2 Tablet(s) Oral at bedtime  tamsulosin 0.4 milliGRAM(s) Oral at bedtime      Vitals:  T(F): 100.4 (09-08-23), Max: 101 (09-08-23)  HR: 76 (09-08-23) (76 - 97)  BP: 115/61 (09-08-23) (113/54 - 153/66)  RR: 18 (09-08-23)  SpO2: 98% (09-08-23)    I and O's:    09-07 @ 07:01  -  09-08 @ 07:00  --------------------------------------------------------  IN: 0 mL / OUT: 1000 mL / NET: -1000 mL    09-08 @ 07:01  -  09-08 @ 23:28  --------------------------------------------------------  IN: 0 mL / OUT: 400 mL / NET: -400 mL            PHYSICAL EXAM:    Constitutional: frail, confused   HEENT:  EOMI,  MM  Neck: No JVD  Respiratory: CTAB  Cardiovascular: S1 and S2  Gastrointestinal: BS+, soft, NT/ND  Extremities: No peripheral edema  Neurological: A/O , confused   : No Lorenzo  Skin: No rashes  Dialysis Access: Not applicable    LABS:                        9.6    10.63 )-----------( 426      ( 08 Sep 2023 09:04 )             30.9                         8.9    10.31 )-----------( 251      ( 07 Sep 2023 07:48 )             30.0       148    |  117    |  31     ----------------------------<  107       08 Sep 2023 09:04  4.0     |  25     |  2.09     151    |  118    |  34     ----------------------------<  93        07 Sep 2023 07:48  3.9     |  24     |  2.20     148    |  116    |  35     ----------------------------<  91        06 Sep 2023 09:12  3.7     |  26     |  2.16     Ca    8.9        08 Sep 2023 09:04  Ca    9.1        07 Sep 2023 07:48      Mg     2.4       05 Sep 2023 15:17    TPro  6.9    /  Alb  2.0    /  TBili  0.4    /        07 Sep 2023 07:48  DBili  x      /  AST  40     /  ALT  25     /  AlkPhos  78       TPro  6.8    /  Alb  2.1    /  TBili  0.4    /        06 Sep 2023 09:12  DBili  x      /  AST  43     /  ALT  28     /  AlkPhos  84           Serum Osmo:   Serum Uric Acid:     Urine Studies:  Urinalysis Basic - ( 08 Sep 2023 09:04 )    Color: x / Appearance: x / SG: x / pH: x  Gluc: 107 mg/dL / Ketone: x  / Bili: x / Urobili: x   Blood: x / Protein: x / Nitrite: x   Leuk Esterase: x / RBC: x / WBC x   Sq Epi: x / Non Sq Epi: x / Bacteria: x        Urine chemistry:   Urine Na:   Urine Creatinine:   Urine Protein/Cr ratio:  Urine K:   Urine Osm:       RADIOLOGY & ADDITIONAL STUDIES:

## 2023-09-08 NOTE — CONSULT NOTE ADULT - ASSESSMENT
77 yo male with hx hypernatremia, depression, Dementia , recent discharge with ALEKSANDR on CKD  NOW readmitted with poor po intake    ALEKSANDR on CKD - prerenal    Cr 1.5 in past    Cr improving with IVF NS - continue this   monitor for retention - bladder scan    avoid IV conrast        * pt seen earlier, note now    Thank you for the courtesy of this consult. We will follow this patient with you.   Management is subject to change if new information becomes available or patient condition changes.        
77 y/o M w/ PMH of hypernatremia, depression / anxiety, psychosis, BPH, dementia, depression, C2 non-union chronic fracture, loose pedical screws from prior cervical surgery, dementia, p/w failure to thrive. Patient is non-verbal and unable to provide history. As per paperwork from facility, patient has not been eating / drinking, and also has not had a BM in the last 4 days. Here noted with fevers, imaging shows Circumferential bladder wall thickening, nonspecific. Findings may be secondary to chronic outlet obstruction versus cystitis. Bibasilar atelectasis. Urine cx growing ESBL Ecoli, given rocephin initially, abx changed to meropenem.     1. Fever. Complicated UTI - ESBL Ecoli. ? Aspiration. ALEKSANDR   - imaging reviewed  - agree with merrem [id number]  - continue with antibiotic coverage  - f/u urine cx, blood cx  - monitor temps  - tolerating abx well so far; no side effects noted  - reason for abx use and side effects reviewed with patient  - supportive care  - wound care for decubiti   - fu cbc    2. other issues - care per medicine 
  HPI: Pt is a 78y old Male with a with PMHx of hypernatremia, depression, anxiety, psychosis, BPH, dementia, depression, chronic dutton, C2 non-union chronic fracture, and loose pedical screws from prior cervical surgery presents to ED from [place] for failure to thrive. Patient is non-verbal at baseline, unable to provide history. As per paperwork from facility, patient has not been eating/drinking, and has not had a BM in the last 4 days. ED course: blood cxs, urine cxs completed. + UTI on UA. + leukocytosis 14.65. NS fluid bolus completed. Ceftriaxone 1g IVP o.d. initiated. admitted for UTI, FTT, constipation     9/7/23 Seen and examined at bedside. Lethargic and unresponsive. Appears in no acute distress     Assessment and Plan:    1) AMS  -Advanced dementia  -FAST 7D  -Metabolic encephalopathy    2) Dementia  -FAST 7C  -Severe protein dontae malnutrition  -No feeding tube  -Supportive care  -Pressure injury  -Immobility  -Contractures    3) UTI  -Chronic dutton  - leukocytosis   - IVF   - chronic dutton changed in ED on arrival     4) ALEKSANDR on CKD   - pt baseline Cr 1.5 per nephro note 8/23/3023; Cr 2.34 on arrival   - cont IVF   - avoid nephrotoxic agents   - trend I&Os  - nephrology consult noted    5) Advanced Directives  -Pt without capacity  -Brother Jasson surrogate  -IZZY completed DNR/DNI/NFT  -Return to [facility] with hospice care

## 2023-09-08 NOTE — PROGRESS NOTE ADULT - ASSESSMENT
78-year-old M with PMHx hypernatremia, depression, anxiety, psychosis, BPH, dementia, depression, chronic dutton, C2 non-union chronic fracture, and loose pedical screws from prior cervical surgery presents to ED from [place] for failure to thrive. Patient is non-verbal at baseline, unable to provide history. As per paperwork from facility, patient has not been eating/drinking, and has not had a BM in the last 4 days.     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 -encouraged oral intake when awake and seated.   -oral vitamins recommended upon discharge when awake and able to swallow.   -nutrition eval appreciated    # Hx of depression, anxiety, BPH, dementia  - c/w home meds: tamsulosin, mirtazapine, quetiapine     # DVT ppx  - LMWH subq b.i.d.     contacted brother Jasson - updated on of care. GOC DNR/DNI     dc to intermediate cancelled due to esbl in urine.

## 2023-09-09 LAB
ANION GAP SERPL CALC-SCNC: 5 MMOL/L — SIGNIFICANT CHANGE UP (ref 5–17)
BUN SERPL-MCNC: 26 MG/DL — HIGH (ref 7–23)
CALCIUM SERPL-MCNC: 9.1 MG/DL — SIGNIFICANT CHANGE UP (ref 8.5–10.1)
CHLORIDE SERPL-SCNC: 112 MMOL/L — HIGH (ref 96–108)
CO2 SERPL-SCNC: 25 MMOL/L — SIGNIFICANT CHANGE UP (ref 22–31)
CREAT SERPL-MCNC: 1.72 MG/DL — HIGH (ref 0.5–1.3)
EGFR: 41 ML/MIN/1.73M2 — LOW
GLUCOSE SERPL-MCNC: 102 MG/DL — HIGH (ref 70–99)
POTASSIUM SERPL-MCNC: 4.8 MMOL/L — SIGNIFICANT CHANGE UP (ref 3.5–5.3)
POTASSIUM SERPL-SCNC: 4.8 MMOL/L — SIGNIFICANT CHANGE UP (ref 3.5–5.3)
SODIUM SERPL-SCNC: 142 MMOL/L — SIGNIFICANT CHANGE UP (ref 135–145)

## 2023-09-09 PROCEDURE — 99232 SBSQ HOSP IP/OBS MODERATE 35: CPT

## 2023-09-09 RX ADMIN — MEROPENEM 1000 MILLIGRAM(S): 1 INJECTION INTRAVENOUS at 21:24

## 2023-09-09 RX ADMIN — MEROPENEM 1000 MILLIGRAM(S): 1 INJECTION INTRAVENOUS at 10:18

## 2023-09-09 RX ADMIN — HEPARIN SODIUM 5000 UNIT(S): 5000 INJECTION INTRAVENOUS; SUBCUTANEOUS at 10:12

## 2023-09-09 RX ADMIN — SODIUM CHLORIDE 75 MILLILITER(S): 9 INJECTION, SOLUTION INTRAVENOUS at 10:22

## 2023-09-09 RX ADMIN — HEPARIN SODIUM 5000 UNIT(S): 5000 INJECTION INTRAVENOUS; SUBCUTANEOUS at 21:25

## 2023-09-09 NOTE — PROGRESS NOTE ADULT - ASSESSMENT
78-year-old M with PMHx hypernatremia, depression, anxiety, psychosis, BPH, dementia, depression, chronic dutton, C2 non-union chronic fracture, and loose pedical screws from prior cervical surgery presents to ED from [place] for failure to thrive. Patient is non-verbal at baseline, unable to provide history. As per paperwork from facility, patient has not been eating/drinking, and has not had a BM in the last 4 days.     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 -encouraged oral intake when awake and seated.   -oral vitamins recommended upon discharge when awake and able to swallow.   -nutrition eval appreciated    # Hx of depression, anxiety, BPH, dementia  - c/w home meds: tamsulosin, mirtazapine, quetiapine     # DVT ppx  - LMWH subq b.i.d.     contacted brother Jasson - updated on of care. C DNR/DNI

## 2023-09-09 NOTE — PROGRESS NOTE ADULT - SUBJECTIVE AND OBJECTIVE BOX
SUBJECTIVE:   HPI: 78-year-old M with PMHx hypernatremia, depression, anxiety, psychosis, BPH, dementia, depression, chronic dutton, C2 non-union chronic fracture, and loose pedical screws from prior cervical surgery presents to ED from [place] for failure to thrive. Patient is non-verbal at baseline, unable to provide history. As per paperwork from facility, patient has not been eating/drinking, and has not had a BM in the last 4 days.     ED course: blood cxs, urine cxs completed. + UTI on UA. + leukocytosis 14.65. NS fluid bolus completed. Ceftriaxone 1g IVP o.d. initiated. admitted for UTI, FTT, constipation     9/6: pt examined sitting up in bed, appearing uncomfortable. pt non-verbal, unable to participate in exam. pt contracted and frail appearing.   9/8: dc yesterday cancelled. pt w/ esbl, changed abx to meropenum. pt overall unchanged.   09/09/23: No new issues. Tolerating IV antibiotic    REVIEW OF SYSTEMS: unable to obtain 2/2 dementia, pt non-verbal.         Vital Signs Last 24 Hrs  T(C): 36.8 (09 Sep 2023 07:40), Max: 38 (08 Sep 2023 16:44)  T(F): 98.3 (09 Sep 2023 07:40), Max: 100.4 (08 Sep 2023 16:44)  HR: 74 (09 Sep 2023 07:40) (74 - 76)  BP: 123/77 (09 Sep 2023 07:40) (115/61 - 127/74)  BP(mean): --  RR: 18 (09 Sep 2023 07:40) (18 - 18)  SpO2: 100% (09 Sep 2023 07:40) (98% - 100%)    Parameters below as of 09 Sep 2023 07:40  Patient On (Oxygen Delivery Method): room air        PHYSICAL EXAM:    Constitutional: comfortable appearing. awake, non-verbal. non-participatory in exam. frail appearing, contracted   HEENT: PERR, EOMI, dry mucous membranes, discharge present to BL conjunctiva   Neck: contracted. No JVD  Respiratory: Breath sounds diminished in bases bilaterally, No wheezing, rales or rhonchi  Cardiovascular: S1 and S2, regular rate and rhythm, no Murmurs, gallops or rubs  Gastrointestinal: Bowel Sounds present, soft, nontender, nondistended, no guarding, no rebound  Genitourinary: chronic dutton in place , dark yellow urine with sediment   Extremities: No peripheral edema  Vascular: 2+ peripheral pulses  Neurological: A/O x 0, non-verbal, no focal deficits  Musculoskeletal: unable to assess motor strength given pts inability to participate in physical examination   Skin: pt w/  a deep tissue injury on patient's right hip measuring 5.5cm x 6.5cm with no depth.      MEDICATIONS:  MEDICATIONS  (STANDING):  ascorbic acid 500 milliGRAM(s) Oral two times a day  cefTRIAXone Injectable. 1000 milliGRAM(s) IV Push every 24 hours  cholecalciferol 4000 Unit(s) Oral daily  ferrous    sulfate 325 milliGRAM(s) Oral two times a day  heparin   Injectable 5000 Unit(s) SubCutaneous every 12 hours  lactulose Syrup 20 Gram(s) Oral every 12 hours  mirtazapine 7.5 milliGRAM(s) Oral at bedtime  polyethylene glycol 3350 17 Gram(s) Oral at bedtime  QUEtiapine 25 milliGRAM(s) Oral at bedtime  senna 2 Tablet(s) Oral at bedtime  sodium chloride 0.45%. 1000 milliLiter(s) (75 mL/Hr) IV Continuous <Continuous>  tamsulosin 0.4 milliGRAM(s) Oral at bedtime      LABS: All Labs Reviewed:                                       9.6    10.63 )-----------( 426      ( 08 Sep 2023 09:04 )             30.9     09 Sep 2023 09:13    142    |  112    |  26     ----------------------------<  102    4.8     |  25     |  1.72     Ca    9.1        09 Sep 2023 09:13          CAPILLARY BLOOD GLUCOSE            Urinalysis Basic - ( 09 Sep 2023 09:13 )    Color: x / Appearance: x / SG: x / pH: x  Gluc: 102 mg/dL / Ketone: x  / Bili: x / Urobili: x   Blood: x / Protein: x / Nitrite: x   Leuk Esterase: x / RBC: x / WBC x   Sq Epi: x / Non Sq Epi: x / Bacteria: x

## 2023-09-10 LAB
ANION GAP SERPL CALC-SCNC: 6 MMOL/L — SIGNIFICANT CHANGE UP (ref 5–17)
BUN SERPL-MCNC: 23 MG/DL — SIGNIFICANT CHANGE UP (ref 7–23)
CALCIUM SERPL-MCNC: 8.9 MG/DL — SIGNIFICANT CHANGE UP (ref 8.5–10.1)
CHLORIDE SERPL-SCNC: 109 MMOL/L — HIGH (ref 96–108)
CO2 SERPL-SCNC: 20 MMOL/L — LOW (ref 22–31)
CREAT SERPL-MCNC: 1.44 MG/DL — HIGH (ref 0.5–1.3)
EGFR: 50 ML/MIN/1.73M2 — LOW
GLUCOSE SERPL-MCNC: 115 MG/DL — HIGH (ref 70–99)
POTASSIUM SERPL-MCNC: 4.3 MMOL/L — SIGNIFICANT CHANGE UP (ref 3.5–5.3)
POTASSIUM SERPL-SCNC: 4.3 MMOL/L — SIGNIFICANT CHANGE UP (ref 3.5–5.3)
SODIUM SERPL-SCNC: 135 MMOL/L — SIGNIFICANT CHANGE UP (ref 135–145)

## 2023-09-10 PROCEDURE — 99232 SBSQ HOSP IP/OBS MODERATE 35: CPT

## 2023-09-10 RX ADMIN — Medication 325 MILLIGRAM(S): at 10:18

## 2023-09-10 RX ADMIN — HEPARIN SODIUM 5000 UNIT(S): 5000 INJECTION INTRAVENOUS; SUBCUTANEOUS at 10:18

## 2023-09-10 RX ADMIN — Medication 4000 UNIT(S): at 10:18

## 2023-09-10 RX ADMIN — MEROPENEM 1000 MILLIGRAM(S): 1 INJECTION INTRAVENOUS at 22:43

## 2023-09-10 RX ADMIN — MEROPENEM 1000 MILLIGRAM(S): 1 INJECTION INTRAVENOUS at 10:19

## 2023-09-10 RX ADMIN — SODIUM CHLORIDE 50 MILLILITER(S): 9 INJECTION, SOLUTION INTRAVENOUS at 12:54

## 2023-09-10 RX ADMIN — LACTULOSE 20 GRAM(S): 10 SOLUTION ORAL at 10:19

## 2023-09-10 RX ADMIN — HEPARIN SODIUM 5000 UNIT(S): 5000 INJECTION INTRAVENOUS; SUBCUTANEOUS at 22:42

## 2023-09-10 RX ADMIN — Medication 500 MILLIGRAM(S): at 10:18

## 2023-09-10 RX ADMIN — SODIUM CHLORIDE 75 MILLILITER(S): 9 INJECTION, SOLUTION INTRAVENOUS at 00:00

## 2023-09-10 NOTE — PROGRESS NOTE ADULT - ASSESSMENT
79 y/o M w/ PMH of hypernatremia, depression / anxiety, psychosis, BPH, dementia, depression, C2 non-union chronic fracture, loose pedical screws from prior cervical surgery, dementia, p/w failure to thrive. Patient is non-verbal and unable to provide history. As per paperwork from facility, patient has not been eating / drinking, and also has not had a BM in the last 4 days. Here noted with fevers, imaging shows Circumferential bladder wall thickening, nonspecific. Findings may be secondary to chronic outlet obstruction versus cystitis. Bibasilar atelectasis. Urine cx growing ESBL Ecoli, given rocephin initially, abx changed to meropenem.     1. Fever. Complicated UTI - ESBL Ecoli. ? Aspiration. ALEKSANDR   - imaging reviewed  - on  merrem [id number] #3  - continue with antibiotic coverage  - f/u urine cx- esbl ecoli blood cx no growth   - 5-7 day abx course  - monitor temps  - tolerating abx well so far; no side effects noted  - reason for abx use and side effects reviewed with patient  - supportive care  - wound care for decubiti   - fu cbc    2. other issues - care per medicine

## 2023-09-10 NOTE — PROGRESS NOTE ADULT - SUBJECTIVE AND OBJECTIVE BOX
Date of service: 09-10-23 @ 19:21    pt seen and examined  no fevers  no o/n events      ROS: unable to obtain d/t medical condition    MEDICATIONS  (STANDING):  ascorbic acid 500 milliGRAM(s) Oral two times a day  cholecalciferol 4000 Unit(s) Oral daily  dextrose 5%. 1000 milliLiter(s) (50 mL/Hr) IV Continuous <Continuous>  ferrous    sulfate 325 milliGRAM(s) Oral two times a day  heparin   Injectable 5000 Unit(s) SubCutaneous every 12 hours  lactulose Syrup 20 Gram(s) Oral every 12 hours  meropenem Injectable 1000 milliGRAM(s) IV Push every 12 hours  mirtazapine 7.5 milliGRAM(s) Oral at bedtime  polyethylene glycol 3350 17 Gram(s) Oral at bedtime  QUEtiapine 25 milliGRAM(s) Oral at bedtime  senna 2 Tablet(s) Oral at bedtime  tamsulosin 0.4 milliGRAM(s) Oral at bedtime    MEDICATIONS  (PRN):  acetaminophen  Suppository .. 650 milliGRAM(s) Rectal every 6 hours PRN Temp greater or equal to 38C (100.4F)      Vital Signs Last 24 Hrs  T(C): 36.3 (10 Sep 2023 08:22), Max: 36.3 (10 Sep 2023 08:22)  T(F): 97.4 (10 Sep 2023 08:22), Max: 97.4 (10 Sep 2023 08:22)  HR: 90 (10 Sep 2023 08:22) (90 - 90)  BP: 130/75 (10 Sep 2023 08:22) (130/75 - 130/75)  BP(mean): --  RR: 18 (10 Sep 2023 08:22) (18 - 18)  SpO2: 100% (10 Sep 2023 08:22) (100% - 100%)    Parameters below as of 10 Sep 2023 08:22  Patient On (Oxygen Delivery Method): room air              PE:  Constitutional: frail looking  HEENT: NC/AT, EOMI, PERRLA, conjunctivae clear; ears and nose atraumatic; pharynx benign  Neck: supple; thyroid not palpable  Back: no tenderness  Respiratory: decreased breath sounds, rhonchi   Cardiovascular: S1S2 regular, no murmurs  Abdomen: soft, not tender, not distended, positive BS; liver and spleen WNL  Genitourinary: no suprapubic tenderness  Lymphatic: no LN palpable  Musculoskeletal: no muscle tenderness, no joint swelling or tenderness  Extremities: no pedal edema  Neurological/ Psychiatric:  moving all extremities  Skin: no rashes; no palpable lesions    Labs: all available labs reviewed               09-10    135  |  109<H>  |  23  ----------------------------<  115<H>  4.3   |  20<L>  |  1.44<H>    Ca    8.9      10 Sep 2023 07:35           Culture - Blood (09.05.23 @ 17:06)   Specimen Source: .Blood None  Culture Results:   No growth at 4 daysCulture Results:   >100,000 CFU/ml Escherichia coli ESBL Multiple Morphological Strains  Organism Identification: Escherichia coli ESBL  Organism: Escherichia coli ESBL  Method Type: ROBERT        Radiology: all available radiological tests reviewed  < from: CT Abdomen and Pelvis No Cont (09.06.23 @ 07:57) >  ACC: [id number] EXAM:  CT ABDOMEN AND PELVIS   ORDERED BY: KIERA VANCE     PROCEDURE DATE:  09/06/2023          INTERPRETATION:  CLINICAL INFORMATION: Decreased oral intake.   Constipation.    COMPARISON: CT chest/abdomen/pelvis 8/19/2023.    CONTRAST/COMPLICATIONS:  IV Contrast: NONE  Oral Contrast: NONE  Complications: None reported at time of study completion    PROCEDURE:  CT of the Abdomen and Pelvis was performed.  Sagittal and coronal reformats were performed.    FINDINGS:    Evaluation of the parenchymal organs and vascular structures is limited   without intravenous contrast.    LOWER CHEST: Bibasilar segmental atelectasis. Coronary artery   calcifications.    LIVER: Within normal limits.  BILE DUCTS: Normal caliber.  GALLBLADDER: Within normal limits.  SPLEEN: Within normal limits.  PANCREAS: Within normal limits.  ADRENALS: Within normal limits.  KIDNEYS/URETERS: Atrophic left kidney. No hydronephrosis.    BLADDER: Indwelling Lorenzo balloon catheter. Circumferential wall   thickening, nonspecific.  REPRODUCTIVE ORGANS: Prostate is enlarged.    BOWEL: No bowel obstruction. Moderate stool burden. Appendix is not   visualized. No evidence of inflammation in the pericecal region.  PERITONEUM: No ascites.  VESSELS: Atherosclerotic changes.  RETROPERITONEUM/LYMPH NODES: No lymphadenopathy.  ABDOMINAL WALL: Within normal limits.  BONES: Degenerative changes. Generalized demineralization. Chronic   bilateral rib fractures. Chronic left inferior pubic ramus fracture.    IMPRESSION:  Moderate stool burden. No bowel obstruction.    Circumferential bladder wall thickening, nonspecific. Findings may be   secondary to chronic outlet obstruction versus cystitis. Correlate with   urinalysis.    Atrophic left kidney. No hydronephrosis.    Advanced directives addressed: full resuscitation

## 2023-09-10 NOTE — PROGRESS NOTE ADULT - ASSESSMENT
78-year-old M with PMHx hypernatremia, depression, anxiety, psychosis, BPH, dementia, depression, chronic dutton, C2 non-union chronic fracture, and loose pedical screws from prior cervical surgery presents to ED from [place] for failure to thrive. Patient is non-verbal at baseline, unable to provide history. As per paperwork from facility, patient has not been eating/drinking, and has not had a BM in the last 4 days.     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 -encouraged oral intake when awake and seated.   -oral vitamins recommended upon discharge when awake and able to swallow.   -nutrition eval appreciated    # Hx of depression, anxiety, BPH, dementia  - c/w home meds: tamsulosin, mirtazapine, quetiapine     # DVT ppx  - LMWH subq b.i.d.     contacted brother Jasson - updated on of care. C DNR/DNI

## 2023-09-10 NOTE — PROGRESS NOTE ADULT - SUBJECTIVE AND OBJECTIVE BOX
SUBJECTIVE:   HPI: 78-year-old M with PMHx hypernatremia, depression, anxiety, psychosis, BPH, dementia, depression, chronic dutton, C2 non-union chronic fracture, and loose pedical screws from prior cervical surgery presents to ED from [place] for failure to thrive. Patient is non-verbal at baseline, unable to provide history. As per paperwork from facility, patient has not been eating/drinking, and has not had a BM in the last 4 days.     ED course: blood cxs, urine cxs completed. + UTI on UA. + leukocytosis 14.65. NS fluid bolus completed. Ceftriaxone 1g IVP o.d. initiated. admitted for UTI, FTT, constipation     9/6: pt examined sitting up in bed, appearing uncomfortable. pt non-verbal, unable to participate in exam. pt contracted and frail appearing.   9/8: dc yesterday cancelled. pt w/ esbl, changed abx to meropenum. pt overall unchanged.   09/09/23: No new issues. Tolerating IV antibiotic  09/10/23: Took his am meds today. No new issues.     REVIEW OF SYSTEMS: unable to obtain 2/2 dementia, pt non-verbal.       Vital Signs Last 24 Hrs  T(C): 36.3 (10 Sep 2023 08:22), Max: 36.3 (10 Sep 2023 08:22)  T(F): 97.4 (10 Sep 2023 08:22), Max: 97.4 (10 Sep 2023 08:22)  HR: 90 (10 Sep 2023 08:22) (90 - 90)  BP: 130/75 (10 Sep 2023 08:22) (130/75 - 130/75)  BP(mean): --  RR: 18 (10 Sep 2023 08:22) (18 - 18)  SpO2: 100% (10 Sep 2023 08:22) (100% - 100%)    Parameters below as of 10 Sep 2023 08:22  Patient On (Oxygen Delivery Method): room air        PHYSICAL EXAM:    Constitutional: comfortable appearing. awake, non-verbal. non-participatory in exam. frail appearing, contracted   HEENT: PERR, EOMI, dry mucous membranes, discharge present to BL conjunctiva   Neck: contracted. No JVD  Respiratory: Breath sounds diminished in bases bilaterally, No wheezing, rales or rhonchi  Cardiovascular: S1 and S2, regular rate and rhythm, no Murmurs, gallops or rubs  Gastrointestinal: Bowel Sounds present, soft, nontender, nondistended, no guarding, no rebound  Genitourinary: chronic dutton in place , dark yellow urine with sediment   Extremities: No peripheral edema  Vascular: 2+ peripheral pulses  Neurological: A/O x 0, non-verbal, no focal deficits  Musculoskeletal: unable to assess motor strength given pts inability to participate in physical examination   Skin: pt w/  a deep tissue injury on patient's right hip measuring 5.5cm x 6.5cm with no depth.      MEDICATIONS:  MEDICATIONS  (STANDING):  ascorbic acid 500 milliGRAM(s) Oral two times a day  cefTRIAXone Injectable. 1000 milliGRAM(s) IV Push every 24 hours  cholecalciferol 4000 Unit(s) Oral daily  ferrous    sulfate 325 milliGRAM(s) Oral two times a day  heparin   Injectable 5000 Unit(s) SubCutaneous every 12 hours  lactulose Syrup 20 Gram(s) Oral every 12 hours  mirtazapine 7.5 milliGRAM(s) Oral at bedtime  polyethylene glycol 3350 17 Gram(s) Oral at bedtime  QUEtiapine 25 milliGRAM(s) Oral at bedtime  senna 2 Tablet(s) Oral at bedtime  sodium chloride 0.45%. 1000 milliLiter(s) (75 mL/Hr) IV Continuous <Continuous>  tamsulosin 0.4 milliGRAM(s) Oral at bedtime      LABS: All Labs Reviewed:                              10 Sep 2023 07:35    135    |  109    |  23     ----------------------------<  115    4.3     |  20     |  1.44     Ca    8.9        10 Sep 2023 07:35          CAPILLARY BLOOD GLUCOSE            Urinalysis Basic - ( 10 Sep 2023 07:35 )    Color: x / Appearance: x / SG: x / pH: x  Gluc: 115 mg/dL / Ketone: x  / Bili: x / Urobili: x   Blood: x / Protein: x / Nitrite: x   Leuk Esterase: x / RBC: x / WBC x   Sq Epi: x / Non Sq Epi: x / Bacteria: x

## 2023-09-11 LAB
CULTURE RESULTS: SIGNIFICANT CHANGE UP
SPECIMEN SOURCE: SIGNIFICANT CHANGE UP

## 2023-09-11 PROCEDURE — 99232 SBSQ HOSP IP/OBS MODERATE 35: CPT

## 2023-09-11 RX ADMIN — MEROPENEM 1000 MILLIGRAM(S): 1 INJECTION INTRAVENOUS at 09:42

## 2023-09-11 RX ADMIN — MEROPENEM 1000 MILLIGRAM(S): 1 INJECTION INTRAVENOUS at 22:10

## 2023-09-11 RX ADMIN — SODIUM CHLORIDE 50 MILLILITER(S): 9 INJECTION, SOLUTION INTRAVENOUS at 20:38

## 2023-09-11 NOTE — PROGRESS NOTE ADULT - ASSESSMENT
78-year-old M with PMHx hypernatremia, depression, anxiety, psychosis, BPH, dementia, depression, chronic dutton, C2 non-union chronic fracture, and loose pedical screws from prior cervical surgery presents to ED from [place] for failure to thrive. Patient is non-verbal at baseline, unable to provide history. As per paperwork from facility, patient has not been eating/drinking, and has not had a BM in the last 4 days.     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 -encouraged oral intake when awake and seated.   -oral vitamins recommended upon discharge when awake and able to swallow.   -nutrition eval appreciated    # Hx of depression, anxiety, BPH, dementia  - c/w home meds: tamsulosin, mirtazapine, quetiapine     # DVT ppx  - LMWH subq b.i.d.     contacted brother Jasson - updated on of care. [facility] DNR/DNI     Disposition: Complete IV meropenem 5 to 7 days

## 2023-09-11 NOTE — PROGRESS NOTE ADULT - SUBJECTIVE AND OBJECTIVE BOX
SUBJECTIVE:   HPI: 78-year-old M with PMHx hypernatremia, depression, anxiety, psychosis, BPH, dementia, depression, chronic dutton, C2 non-union chronic fracture, and loose pedical screws from prior cervical surgery presents to ED from [facility] for failure to thrive. Patient is non-verbal at baseline, unable to provide history. As per paperwork from facility, patient has not been eating/drinking, and has not had a BM in the last 4 days.     ED course: blood cxs, urine cxs completed. + UTI on UA. + leukocytosis 14.65. NS fluid bolus completed. Ceftriaxone 1g IVP o.d. initiated. admitted for UTI, FTT, constipation     9/6: pt examined sitting up in bed, appearing uncomfortable. pt non-verbal, unable to participate in exam. pt contracted and frail appearing.   9/8: dc yesterday cancelled. pt w/ esbl, changed abx to meropenum. pt overall unchanged.   09/09/23: No new issues. Tolerating IV antibiotic  09/10/23: Took his am meds today. No new issues.   09/11/23: No  new issues.     REVIEW OF SYSTEMS: unable to obtain 2/2 dementia, pt non-verbal.       Vital Signs Last 24 Hrs  T(C): 36.6 (11 Sep 2023 08:00), Max: 36.7 (11 Sep 2023 00:16)  T(F): 97.9 (11 Sep 2023 08:00), Max: 98 (11 Sep 2023 00:16)  HR: 86 (11 Sep 2023 08:00) (84 - 86)  BP: 113/50 (11 Sep 2023 08:00) (113/50 - 119/68)  BP(mean): --  RR: 18 (11 Sep 2023 08:00) (18 - 18)  SpO2: 99% (11 Sep 2023 08:00) (99% - 100%)    Parameters below as of 11 Sep 2023 08:00  Patient On (Oxygen Delivery Method): room air            PHYSICAL EXAM:    Constitutional: comfortable appearing. awake, non-verbal. non-participatory in exam. frail appearing, contracted   HEENT: PERR, EOMI, dry mucous membranes, discharge present to BL conjunctiva   Neck: contracted. No JVD  Respiratory: Breath sounds diminished in bases bilaterally, No wheezing, rales or rhonchi  Cardiovascular: S1 and S2, regular rate and rhythm, no Murmurs, gallops or rubs  Gastrointestinal: Bowel Sounds present, soft, nontender, nondistended, no guarding, no rebound  Genitourinary: chronic dutton in place , dark yellow urine with sediment   Extremities: No peripheral edema  Vascular: 2+ peripheral pulses  Neurological: A/O x 0, non-verbal, no focal deficits  Musculoskeletal: unable to assess motor strength given pts inability to participate in physical examination   Skin: pt w/  a deep tissue injury on patient's right hip measuring 5.5cm x 6.5cm with no depth.      MEDICATIONS:  MEDICATIONS  (STANDING):  ascorbic acid 500 milliGRAM(s) Oral two times a day  cefTRIAXone Injectable. 1000 milliGRAM(s) IV Push every 24 hours  cholecalciferol 4000 Unit(s) Oral daily  ferrous    sulfate 325 milliGRAM(s) Oral two times a day  heparin   Injectable 5000 Unit(s) SubCutaneous every 12 hours  lactulose Syrup 20 Gram(s) Oral every 12 hours  mirtazapine 7.5 milliGRAM(s) Oral at bedtime  polyethylene glycol 3350 17 Gram(s) Oral at bedtime  QUEtiapine 25 milliGRAM(s) Oral at bedtime  senna 2 Tablet(s) Oral at bedtime  sodium chloride 0.45%. 1000 milliLiter(s) (75 mL/Hr) IV Continuous <Continuous>  tamsulosin 0.4 milliGRAM(s) Oral at bedtime      LABS: All Labs Reviewed:                       10 Sep 2023 07:35    135    |  109    |  23     ----------------------------<  115    4.3     |  20     |  1.44     Ca    8.9        10 Sep 2023 07:35          CAPILLARY BLOOD GLUCOSE            Urinalysis Basic - ( 10 Sep 2023 07:35 )    Color: x / Appearance: x / SG: x / pH: x  Gluc: 115 mg/dL / Ketone: x  / Bili: x / Urobili: x   Blood: x / Protein: x / Nitrite: x   Leuk Esterase: x / RBC: x / WBC x   Sq Epi: x / Non Sq Epi: x / Bacteria: x                           [Up to Date] : Up to Date

## 2023-09-11 NOTE — PROGRESS NOTE ADULT - SUBJECTIVE AND OBJECTIVE BOX
Date of service: 09-11-23 @ 14:10    pt seen and examined  no fevers  no o/n events  comfortable     ROS: unable to obtain d/t medical condition      MEDICATIONS  (STANDING):  ascorbic acid 500 milliGRAM(s) Oral two times a day  cholecalciferol 4000 Unit(s) Oral daily  dextrose 5%. 1000 milliLiter(s) (50 mL/Hr) IV Continuous <Continuous>  ferrous    sulfate 325 milliGRAM(s) Oral two times a day  heparin   Injectable 5000 Unit(s) SubCutaneous every 12 hours  lactulose Syrup 20 Gram(s) Oral every 12 hours  meropenem Injectable 1000 milliGRAM(s) IV Push every 12 hours  mirtazapine 7.5 milliGRAM(s) Oral at bedtime  polyethylene glycol 3350 17 Gram(s) Oral at bedtime  QUEtiapine 25 milliGRAM(s) Oral at bedtime  senna 2 Tablet(s) Oral at bedtime  tamsulosin 0.4 milliGRAM(s) Oral at bedtime      Vital Signs Last 24 Hrs  T(C): 36.6 (11 Sep 2023 08:00), Max: 36.7 (11 Sep 2023 00:16)  T(F): 97.9 (11 Sep 2023 08:00), Max: 98 (11 Sep 2023 00:16)  HR: 86 (11 Sep 2023 08:00) (84 - 86)  BP: 113/50 (11 Sep 2023 08:00) (113/50 - 119/68)  BP(mean): --  RR: 18 (11 Sep 2023 08:00) (18 - 18)  SpO2: 99% (11 Sep 2023 08:00) (99% - 100%)    Parameters below as of 11 Sep 2023 08:00  Patient On (Oxygen Delivery Method): room air        PE:  Constitutional: frail looking  HEENT: NC/AT, EOMI, PERRLA, conjunctivae clear; ears and nose atraumatic; pharynx benign  Neck: supple; thyroid not palpable  Back: no tenderness  Respiratory: decreased breath sounds, rhonchi   Cardiovascular: S1S2 regular, no murmurs  Abdomen: soft, not tender, not distended, positive BS; liver and spleen WNL  Genitourinary: no suprapubic tenderness  Lymphatic: no LN palpable  Musculoskeletal: no muscle tenderness, no joint swelling or tenderness  Extremities: no pedal edema  Neurological/ Psychiatric:  moving all extremities  Skin: no rashes; no palpable lesions    Labs: all available labs reviewed                 09-10    135  |  109<H>  |  23  ----------------------------<  115<H>  4.3   |  20<L>  |  1.44<H>    Ca    8.9      10 Sep 2023 07:35    Culture - Blood (09.05.23 @ 17:06)   Specimen Source: .Blood None  Culture Results:   No growth at 4 days  Culture Results:   >100,000 CFU/ml Escherichia coli ESBL Multiple Morphological Strains  Organism Identification: Escherichia coli ESBL  Organism: Escherichia coli ESBL  Method Type: ROBERT      Radiology: all available radiological tests reviewed  < from: CT Abdomen and Pelvis No Cont (09.06.23 @ 07:57) >  ACC: [id number] EXAM:  CT ABDOMEN AND PELVIS   ORDERED BY: KIERA VANCE     PROCEDURE DATE:  09/06/2023          INTERPRETATION:  CLINICAL INFORMATION: Decreased oral intake.   Constipation.    COMPARISON: CT chest/abdomen/pelvis 8/19/2023.    CONTRAST/COMPLICATIONS:  IV Contrast: NONE  Oral Contrast: NONE  Complications: None reported at time of study completion    PROCEDURE:  CT of the Abdomen and Pelvis was performed.  Sagittal and coronal reformats were performed.    FINDINGS:    Evaluation of the parenchymal organs and vascular structures is limited   without intravenous contrast.    LOWER CHEST: Bibasilar segmental atelectasis. Coronary artery   calcifications.    LIVER: Within normal limits.  BILE DUCTS: Normal caliber.  GALLBLADDER: Within normal limits.  SPLEEN: Within normal limits.  PANCREAS: Within normal limits.  ADRENALS: Within normal limits.  KIDNEYS/URETERS: Atrophic left kidney. No hydronephrosis.    BLADDER: Indwelling Lorenzo balloon catheter. Circumferential wall   thickening, nonspecific.  REPRODUCTIVE ORGANS: Prostate is enlarged.    BOWEL: No bowel obstruction. Moderate stool burden. Appendix is not   visualized. No evidence of inflammation in the pericecal region.  PERITONEUM: No ascites.  VESSELS: Atherosclerotic changes.  RETROPERITONEUM/LYMPH NODES: No lymphadenopathy.  ABDOMINAL WALL: Within normal limits.  BONES: Degenerative changes. Generalized demineralization. Chronic   bilateral rib fractures. Chronic left inferior pubic ramus fracture.    IMPRESSION:  Moderate stool burden. No bowel obstruction.    Circumferential bladder wall thickening, nonspecific. Findings may be   secondary to chronic outlet obstruction versus cystitis. Correlate with   urinalysis.    Atrophic left kidney. No hydronephrosis.    Advanced directives addressed: full resuscitation

## 2023-09-11 NOTE — PROGRESS NOTE ADULT - ASSESSMENT
79 y/o M w/ PMH of hypernatremia, depression / anxiety, psychosis, BPH, dementia, depression, C2 non-union chronic fracture, loose pedical screws from prior cervical surgery, dementia, p/w failure to thrive. Patient is non-verbal and unable to provide history. As per paperwork from facility, patient has not been eating / drinking, and also has not had a BM in the last 4 days. Here noted with fevers, imaging shows Circumferential bladder wall thickening, nonspecific. Findings may be secondary to chronic outlet obstruction versus cystitis. Bibasilar atelectasis. Urine cx growing ESBL Ecoli, given rocephin initially, abx changed to meropenem.     1. Fever. Complicated UTI - ESBL Ecoli. ? Aspiration. ALEKSANDR   - imaging reviewed  - on merrem [id number] #4  - continue with antibiotic coverage  - f/u urine cx- esbl ecoli blood cx no growth   - 5-7 day abx course  - monitor temps  - tolerating abx well so far; no side effects noted  - reason for abx use and side effects reviewed with patient  - supportive care  - wound care for decubiti   - fu cbc    2. other issues - care per medicine

## 2023-09-11 NOTE — PROGRESS NOTE ADULT - ASSESSMENT
HPI: Pt is a 78y old Male with a with PMHx of hypernatremia, depression, anxiety, psychosis, BPH, dementia, depression, chronic dutton, C2 non-union chronic fracture, and loose pedical screws from prior cervical surgery presents to ED from [facility] for failure to thrive. Patient is non-verbal at baseline, unable to provide history. As per paperwork from facility, patient has not been eating/drinking, and has not had a BM in the last 4 days. ED course: blood cxs, urine cxs completed. + UTI on UA. + leukocytosis 14.65. NS fluid bolus completed. Ceftriaxone 1g IVP o.d. initiated. admitted for UTI, FTT, constipation     9/7/23 Seen and examined at bedside. Lethargic and unresponsive. Appears in no acute distress     Assessment and Plan:    1) AMS  -Advanced dementia  -FAST 7D  -Metabolic encephalopathy  -Unresponsive    2) Dementia  -FAST 7C  -Severe protein dontae malnutrition  -No feeding tube  -Supportive care  -Pressure injury  -Immobility  -Contractures    3) UTI  -Chronic dutton  - leukocytosis   - chronic dutton changed in ED on arrival   -ESBL urine  -Cont abx    4) ALEKSANDR on CKD   - pt baseline Cr 1.5 per nephro note 8/23/3023; Cr 2.34 on arrival   - cont IVF   - avoid nephrotoxic agents   - trend I&Os  - nephrology consult noted    5) Advanced Directives  -Pt without capacity  -Brother Jasson surrogate  -IZZY completed DNR/DNI/NFT  -Return to [facility] with hospice care

## 2023-09-11 NOTE — PROGRESS NOTE ADULT - SUBJECTIVE AND OBJECTIVE BOX
HPI: Pt is a 78y old Male with a with PMHx of hypernatremia, depression, anxiety, psychosis, BPH, dementia, depression, chronic dutton, C2 non-union chronic fracture, and loose pedical screws from prior cervical surgery presents to ED from [facility] for failure to thrive. Patient is non-verbal at baseline, unable to provide history. As per paperwork from facility, patient has not been eating/drinking, and has not had a BM in the last 4 days. ED course: blood cxs, urine cxs completed. + UTI on UA. + leukocytosis 14.65. NS fluid bolus completed. Ceftriaxone 1g IVP o.d. initiated. admitted for UTI, FTT, constipation     9/7/23 Seen and examined at bedside. Lethargic and unresponsive. Appears in no acute distress   9/11/23 Seen and examined at bedside. Unresponsive to voice    PAIN: ( )Yes   ( )No  No non verbal signs or symptoms    DYSPNEA: ( ) Yes  (X ) No    PAST MEDICAL & SURGICAL HISTORY:  Anemia  Depression  BPH (benign prostatic hyperplasia)  Dementia  H/O cervical spine surgery    SOCIAL HX:  Lives at [facility]  Hx opiate tolerance ( )YES  (X )NO    Baseline ADLs  (Prior to Admission)  ( ) Independent   (X )Dependent    FAMILY HISTORY:  Unable to obtain due to dementia    Review of Systems:  Unable to obtain/Limited due to: dementia      PHYSICAL EXAM:  ICU Vital Signs Last 24 Hrs  T(C): 36.6 (11 Sep 2023 08:00), Max: 36.7 (11 Sep 2023 00:16)  T(F): 97.9 (11 Sep 2023 08:00), Max: 98 (11 Sep 2023 00:16)  HR: 86 (11 Sep 2023 08:00) (84 - 86)  BP: 113/50 (11 Sep 2023 08:00) (113/50 - 119/68)  RR: 18 (11 Sep 2023 08:00) (18 - 18)  SpO2: 99% (11 Sep 2023 08:00) (99% - 100%)    O2 Parameters below as of 11 Sep 2023 08:00  Patient On (Oxygen Delivery Method): room air    PPSV2: 10-20  %  FAST: 7C    General: Elderly male in bed in NAD  Mental Status: Unresponsive to voice  HEENT: oral mucosa dry/+ temp wasting  Lungs: clear to auscultation jerrod  Cardiac: S1S2+  GI: Abd soft/NT ND + BS  : dutton  Ext: contractures X4  Neuro: unresponsive      LABS:                        8.9    10.31 )-----------( 251      ( 07 Sep 2023 07:48 )             30.0   09-10    135  |  109<H>  |  23  ----------------------------<  115<H>  4.3   |  20<L>  |  1.44<H>    Ca    8.9      10 Sep 2023 07:35  TPro  6.9  /  Alb  2.0<L>  /  TBili  0.4  /  DBili  x   /  AST  40<H>  /  ALT  25  /  AlkPhos  78  09-07    PT/INR - ( 06 Sep 2023 09:12 )   PT: 13.0 sec;   INR: 1.16 ratio         PTT - ( 06 Sep 2023 09:12 )  PTT:29.7 sec  Albumin: Albumin: 2.0 g/dL (09-07 @ 07:48)      Allergies    No Known Allergies    Intolerances      MEDICATIONS  (STANDING):  ascorbic acid 500 milliGRAM(s) Oral two times a day  cefTRIAXone Injectable. 1000 milliGRAM(s) IV Push every 24 hours  cholecalciferol 4000 Unit(s) Oral daily  dextrose 5%. 1000 milliLiter(s) (75 mL/Hr) IV Continuous <Continuous>  ferrous    sulfate 325 milliGRAM(s) Oral two times a day  heparin   Injectable 5000 Unit(s) SubCutaneous every 12 hours  lactulose Syrup 20 Gram(s) Oral every 12 hours  mirtazapine 7.5 milliGRAM(s) Oral at bedtime  polyethylene glycol 3350 17 Gram(s) Oral at bedtime  QUEtiapine 25 milliGRAM(s) Oral at bedtime  senna 2 Tablet(s) Oral at bedtime  tamsulosin 0.4 milliGRAM(s) Oral at bedtime    MEDICATIONS  (PRN):  acetaminophen  Suppository .. 650 milliGRAM(s) Rectal every 6 hours PRN Temp greater or equal to 38C (100.4F)      RADIOLOGY/ADDITIONAL STUDIES:

## 2023-09-12 LAB
CULTURE RESULTS: SIGNIFICANT CHANGE UP
SPECIMEN SOURCE: SIGNIFICANT CHANGE UP

## 2023-09-12 PROCEDURE — 99232 SBSQ HOSP IP/OBS MODERATE 35: CPT

## 2023-09-12 PROCEDURE — 99231 SBSQ HOSP IP/OBS SF/LOW 25: CPT

## 2023-09-12 RX ADMIN — HEPARIN SODIUM 5000 UNIT(S): 5000 INJECTION INTRAVENOUS; SUBCUTANEOUS at 21:44

## 2023-09-12 RX ADMIN — MEROPENEM 1000 MILLIGRAM(S): 1 INJECTION INTRAVENOUS at 21:45

## 2023-09-12 RX ADMIN — MEROPENEM 1000 MILLIGRAM(S): 1 INJECTION INTRAVENOUS at 09:34

## 2023-09-12 RX ADMIN — HEPARIN SODIUM 5000 UNIT(S): 5000 INJECTION INTRAVENOUS; SUBCUTANEOUS at 09:34

## 2023-09-12 RX ADMIN — SODIUM CHLORIDE 50 MILLILITER(S): 9 INJECTION, SOLUTION INTRAVENOUS at 16:40

## 2023-09-12 NOTE — PROGRESS NOTE ADULT - SUBJECTIVE AND OBJECTIVE BOX
SUBJECTIVE:   HPI: 78-year-old M with PMHx hypernatremia, depression, anxiety, psychosis, BPH, dementia, depression, chronic dutton, C2 non-union chronic fracture, and loose pedical screws from prior cervical surgery presents to ED from [place] for failure to thrive. Patient is non-verbal at baseline, unable to provide history. As per paperwork from facility, patient has not been eating/drinking, and has not had a BM in the last 4 days.     ED course: blood cxs, urine cxs completed. + UTI on UA. + leukocytosis 14.65. NS fluid bolus completed. Ceftriaxone 1g IVP o.d. initiated. admitted for UTI, FTT, constipation     9/6: pt examined sitting up in bed, appearing uncomfortable. pt non-verbal, unable to participate in exam. pt contracted and frail appearing.   9/8: dc yesterday cancelled. pt w/ esbl, changed abx to meropenum. pt overall unchanged.   09/09/23: No new issues. Tolerating IV antibiotic  09/10/23: Took his am meds today. No new issues.   09/11/23: No  new issues.   9/12: status quo. tolerating abx. appearing comfortable     REVIEW OF SYSTEMS: unable to obtain 2/2 dementia, pt non-verbal.       Vital Signs Last 24 Hrs  T(C): 36.8 (12 Sep 2023 07:11), Max: 37.5 (11 Sep 2023 23:23)  T(F): 98.3 (12 Sep 2023 07:11), Max: 99.5 (11 Sep 2023 23:23)  HR: 87 (12 Sep 2023 07:11) (87 - 96)  BP: 103/55 (12 Sep 2023 07:11) (103/55 - 115/55)  BP(mean): --  RR: 18 (12 Sep 2023 07:11) (18 - 18)  SpO2: 100% (12 Sep 2023 07:11) (97% - 100%)    Parameters below as of 12 Sep 2023 07:11  Patient On (Oxygen Delivery Method): room air        PHYSICAL EXAM:    Constitutional: comfortable appearing. awake, non-verbal. non-participatory in exam. frail appearing, contracted   HEENT: PERR, EOMI, dry mucous membranes, discharge present to BL conjunctiva   Neck: contracted. No JVD  Respiratory: Breath sounds diminished in bases bilaterally, No wheezing, rales or rhonchi  Cardiovascular: S1 and S2, regular rate and rhythm, no Murmurs, gallops or rubs  Gastrointestinal: Bowel Sounds present, soft, nontender, nondistended, no guarding, no rebound  Genitourinary: chronic dutton in place , dark yellow urine with sediment   Extremities: No peripheral edema  Vascular: 2+ peripheral pulses  Neurological: A/O x 0, non-verbal, no focal deficits  Musculoskeletal: unable to assess motor strength given pts inability to participate in physical examination   Skin: pt w/  a deep tissue injury on patient's right hip measuring 5.5cm x 6.5cm with no depth.      MEDICATIONS:  MEDICATIONS  (STANDING):  ascorbic acid 500 milliGRAM(s) Oral two times a day  cefTRIAXone Injectable. 1000 milliGRAM(s) IV Push every 24 hours  cholecalciferol 4000 Unit(s) Oral daily  ferrous    sulfate 325 milliGRAM(s) Oral two times a day  heparin   Injectable 5000 Unit(s) SubCutaneous every 12 hours  lactulose Syrup 20 Gram(s) Oral every 12 hours  mirtazapine 7.5 milliGRAM(s) Oral at bedtime  polyethylene glycol 3350 17 Gram(s) Oral at bedtime  QUEtiapine 25 milliGRAM(s) Oral at bedtime  senna 2 Tablet(s) Oral at bedtime  sodium chloride 0.45%. 1000 milliLiter(s) (75 mL/Hr) IV Continuous <Continuous>  tamsulosin 0.4 milliGRAM(s) Oral at bedtime      LABS: All Labs Reviewed:                       10 Sep 2023 07:35    135    |  109    |  23     ----------------------------<  115    4.3     |  20     |  1.44     Ca    8.9        10 Sep 2023 07:35          CAPILLARY BLOOD GLUCOSE            Urinalysis Basic - ( 10 Sep 2023 07:35 )    Color: x / Appearance: x / SG: x / pH: x  Gluc: 115 mg/dL / Ketone: x  / Bili: x / Urobili: x   Blood: x / Protein: x / Nitrite: x   Leuk Esterase: x / RBC: x / WBC x   Sq Epi: x / Non Sq Epi: x / Bacteria: x

## 2023-09-12 NOTE — PROGRESS NOTE ADULT - ASSESSMENT
HPI: Pt is a 78y old Male with a with PMHx of hypernatremia, depression, anxiety, psychosis, BPH, dementia, depression, chronic dutton, C2 non-union chronic fracture, and loose pedical screws from prior cervical surgery presents to ED from [facility] for failure to thrive. Patient is non-verbal at baseline, unable to provide history. As per paperwork from facility, patient has not been eating/drinking, and has not had a BM in the last 4 days. ED course: blood cxs, urine cxs completed. + UTI on UA. + leukocytosis 14.65. NS fluid bolus completed. Ceftriaxone 1g IVP o.d. initiated. admitted for UTI, FTT, constipation     9/7/23 Seen and examined at bedside. Lethargic and unresponsive. Appears in no acute distress     Assessment and Plan:    1) AMS  -Advanced dementia  -FAST 7D  -Metabolic encephalopathy  -Unresponsive    2) Dementia  -FAST 7C  -Severe protein dontae malnutrition  -No feeding tube  -Supportive care  -Pressure injury  -Immobility  -Contractures    3) UTI  -Chronic duttno  - leukocytosis   - chronic dutton changed in ED on arrival   -ESBL urine  -Cont abx    4) ALEKSANDR on CKD   - pt baseline Cr 1.5 per nephro note 8/23/3023; Cr 2.34 on arrival   - cont IVF   - avoid nephrotoxic agents   - trend I&Os  - nephrology consult noted    5) Advanced Directives  -Pt without capacity  -Brother Spaulding surrogate  -MOLST completed DNR/DNI/NFT  -Return to [facility] with hospice care  -Follow up call placed to Jasson/message left    Time Spent: 25 minutes including the care, coordination and counseling of this patient, 50% of which was spent coordinating and counseling.

## 2023-09-12 NOTE — PROGRESS NOTE ADULT - NUTRITIONAL ASSESSMENT
This patient has been assessed with a concern for Malnutrition and has been determined to have a diagnosis/diagnoses of Severe protein-calorie malnutrition and Underweight (BMI < 19).    This patient is being managed with:   Diet Regular-  Soft and Bite Sized ([id number])  Entered: Sep  6 2023 12:52AM  

## 2023-09-12 NOTE — PROGRESS NOTE ADULT - PROVIDER SPECIALTY LIST ADULT
Hospitalist
Infectious Disease
Infectious Disease
Nephrology
Hospitalist
Hospitalist
Nephrology
Hospitalist
Palliative Care
Palliative Care

## 2023-09-12 NOTE — GOALS OF CARE CONVERSATION - ADVANCED CARE PLANNING - CONVERSATION DETAILS
Spoke with Jasson and his wife via telephone this afternoon to further discuss GOC and determine plans.  Jasson provided consent to speak with wife.  Family reports plan to return to [place] with palliative home care.  This writer inquired if family desire future hospitalizations as palliative home care will not prevent hospitalizations, family reported last week they do not wish to come back and forth to the hospital and desired the support of hospice.  Family state at this time they desire future hospitalizations and want to return to [place] with Palliative home care.  Family stated they discussed plans with Maya from [facility] in details and to contact Maya with additional questions.  Families feelings explored.  Support provided.    Family report plan to return to [place] with palliative home care.  DNR/DNI in place.  Emotional support provided.  Our team to continue to follow.
HPI:  77 y/o M w/ PMH of hypernatremia, depression / anxiety, psychosis, BPH, dementia, depression, C2 non-union chronic fracture, loose pedical screws from prior cervical surgery, dementia, p/w failure to thrive. Patient is non-verbal and unable to provide history. As per paperwork from facility, patient has not been eating / drinking, and also has not had a BM in the last 4 days.     (06 Sep 2023 00:36)      PERTINENT PMH REVIEWED:  [ X ] YES [ ] NO           Primary Contact:   brother Spaulding, phone # [phone]    HCP [  ] Surrogate [  X  ] Guardian [   ] -brother reports he is Pts HCP, document not provided.    Mental Status: Pt. lacks capacity.  Concerns of Depression [  ] -not identified  Anxiety [   ] -not identified  Baseline ADLs (prior to admission):  Independent [ ] moderately [ ] fully   Dependent   [ ] moderately [ X ]fully    Family Meeting attendees: GOC held with brother via telephone.    Anticipated Grief: Patient[  ] Family [ X ]    Caregiver [place] Assessed: Yes [ X ] No [  ]    Jainism: Yarsanism    Spiritual Concerns: Not identified,  available for support.    Goals of Care: Comfort [ X ] Rehabilitation [  ] Curative [  ] Life Prolonging [  ]    Previous Services: [facility]    ADVANCE DIRECTIVES:    -Pt lacks capacity  -Jasson Villela is surrogate decision maker in the event HCP is not provided.    -MOLST completed to reflect DNR/DNI/No Feeding tube    Anticipated D/C Plan: return to [place] with home hospice.                     Summary:  Palliative NP, Yokasta Germain and this SW spoke with brotherJasson via telephone to discuss GOC, assist with planning and provide supportive counseling.  Palliative role explained.  Emotional support provided.  Pt. lacks capacity.  Prior to hospitalization, Pt. resided at [facility].  Brother shared Pts medical hx and housing situations stating he provided his brother with assistance for 3 years.  Brother shared that Pts dementia has progressed the recent months, noting that Pt no longer recognizes his brother and has had a decline PO intake as well.  Families feelings explored.  Support provided.    We discussed Pts current medical condition.  We discussed dementia being a progressive illness and the option to return to [place] with the support of hospice and a comfort focus.  We discussed stopping blood work, xrays, testing etc to focus on Pts comfort and treat Pts overall symptoms.  We discussed the option for no hospitalizations therefore Pt can remain at [facility] which is most familiar to him.  We also discussed Pts wishes regarding resuscitation or intubation.  Brother shared that Pt. would not want anything aggressive.   We also discussed why Pegs are not recommended with Pts with dementia including it does not take away the risk for aspiration.  Brother states Pt would not want a feeding tube.  MOLST completed to reflect DNR/DNI/No Feeding tube.  Brother reports the desire to return back to [place] with a comfort focus and the support of hospice at home.      Directives reviewed and updated.  MOLST completed with verbal consent for DNR/DNI/No Feeding tube.  Brother shared he has HCP which names himself, in the event a HCP is not provided brother is the surrogate decision maker.    Plan to return to [place] with the support of home hospice.  Emotional support provided.  Our team to continue to follow.

## 2023-09-12 NOTE — PROGRESS NOTE ADULT - SUBJECTIVE AND OBJECTIVE BOX
HPI: Pt is a 78y old Male with a with PMHx of hypernatremia, depression, anxiety, psychosis, BPH, dementia, depression, chronic dutton, C2 non-union chronic fracture, and loose pedical screws from prior cervical surgery presents to ED from [facility] for failure to thrive. Patient is non-verbal at baseline, unable to provide history. As per paperwork from facility, patient has not been eating/drinking, and has not had a BM in the last 4 days. ED course: blood cxs, urine cxs completed. + UTI on UA. + leukocytosis 14.65. NS fluid bolus completed. Ceftriaxone 1g IVP o.d. initiated. admitted for UTI, FTT, constipation     9/7/23 Seen and examined at bedside. Lethargic and unresponsive. Appears in no acute distress   9/11/23 Seen and examined at bedside. Unresponsive to voice  9/12/23 Seen and examined at bedside with no family present. Remains unresponsive    PAIN: ( )Yes   ( )No  No non verbal signs or symptoms    DYSPNEA: ( ) Yes  (X ) No    PAST MEDICAL & SURGICAL HISTORY:  Anemia  Depression  BPH (benign prostatic hyperplasia)  Dementia  H/O cervical spine surgery    SOCIAL HX:  Lives at [facility]  Hx opiate tolerance ( )YES  (X )NO    Baseline ADLs  (Prior to Admission)  ( ) Independent   (X )Dependent    FAMILY HISTORY:  Unable to obtain due to dementia    Review of Systems:  Unable to obtain/Limited due to: dementia      PHYSICAL EXAM:  ICU Vital Signs Last 24 Hrs  T(C): 36.8 (12 Sep 2023 07:11), Max: 37.5 (11 Sep 2023 23:23)  T(F): 98.3 (12 Sep 2023 07:11), Max: 99.5 (11 Sep 2023 23:23)  HR: 87 (12 Sep 2023 07:11) (87 - 96)  BP: 103/55 (12 Sep 2023 07:11) (103/55 - 115/55)  RR: 18 (12 Sep 2023 07:11) (18 - 18)  SpO2: 100% (12 Sep 2023 07:11) (97% - 100%)    O2 Parameters below as of 12 Sep 2023 07:11  Patient On (Oxygen Delivery Method): room air    PPSV2: 10-20  %  FAST: 7C    General: Elderly male in bed in NAD  Mental Status: Unresponsive to voice  HEENT: oral mucosa dry/+ temp wasting  Lungs: clear to auscultation jerrod  Cardiac: S1S2+  GI: Abd soft/NT ND + BS  : dutton  Ext: contractures X4  Neuro: unresponsive      LABS:             TPro  6.9  /  Alb  2.0<L>  /  TBili  0.4  /  DBili  x   /  AST  40<H>  /  ALT  25  /  AlkPhos  78  09-07    PT/INR - ( 06 Sep 2023 09:12 )   PT: 13.0 sec;   INR: 1.16 ratio      Albumin: Albumin: 2.0 g/dL (09-07 @ 07:48)      Allergies    No Known Allergies    Intolerances    MEDICATIONS  (STANDING):  ascorbic acid 500 milliGRAM(s) Oral two times a day  cholecalciferol 4000 Unit(s) Oral daily  dextrose 5%. 1000 milliLiter(s) (50 mL/Hr) IV Continuous <Continuous>  ferrous    sulfate 325 milliGRAM(s) Oral two times a day  heparin   Injectable 5000 Unit(s) SubCutaneous every 12 hours  lactulose Syrup 20 Gram(s) Oral every 12 hours  meropenem Injectable 1000 milliGRAM(s) IV Push every 12 hours  mirtazapine 7.5 milliGRAM(s) Oral at bedtime  polyethylene glycol 3350 17 Gram(s) Oral at bedtime  QUEtiapine 25 milliGRAM(s) Oral at bedtime  senna 2 Tablet(s) Oral at bedtime  tamsulosin 0.4 milliGRAM(s) Oral at bedtime    MEDICATIONS  (PRN):  acetaminophen  Suppository .. 650 milliGRAM(s) Rectal every 6 hours PRN Temp greater or equal to 38C (100.4F)    RADIOLOGY/ADDITIONAL STUDIES:

## 2023-09-12 NOTE — CHART NOTE - NSCHARTNOTEFT_GEN_A_CORE
Message left via telephone for brother to follow up and further determine plans.  Awaiting call back.  Our team to continue to follow.

## 2023-09-12 NOTE — PROGRESS NOTE ADULT - ASSESSMENT
78-year-old M with PMHx hypernatremia, depression, anxiety, psychosis, BPH, dementia, depression, chronic dutton, C2 non-union chronic fracture, and loose pedical screws from prior cervical surgery presents to ED from [place] for failure to thrive. Patient is non-verbal at baseline, unable to provide history. As per paperwork from facility, patient has not been eating/drinking, and has not had a BM in the last 4 days.     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 -encouraged oral intake when awake and seated.   -oral vitamins recommended upon discharge when awake and able to swallow.   -nutrition eval appreciated    # Hx of depression, anxiety, BPH, dementia  - c/w home meds: tamsulosin, mirtazapine, quetiapine     # DVT ppx  - LMWH subq b.i.d.     contacted brother Jasson - updated on of care. [facility] DNR/DNI     Disposition: Complete IV meropenem 5 day tomorrow dc tomorrow after iv abx

## 2023-09-13 ENCOUNTER — TRANSCRIPTION ENCOUNTER (OUTPATIENT)
Age: 78
End: 2023-09-13

## 2023-09-13 VITALS
DIASTOLIC BLOOD PRESSURE: 58 MMHG | RESPIRATION RATE: 18 BRPM | SYSTOLIC BLOOD PRESSURE: 111 MMHG | HEART RATE: 73 BPM | TEMPERATURE: 97 F | OXYGEN SATURATION: 99 %

## 2023-09-13 PROCEDURE — 99239 HOSP IP/OBS DSCHRG MGMT >30: CPT

## 2023-09-13 RX ORDER — SENNA PLUS 8.6 MG/1
2 TABLET ORAL
Qty: 0 | Refills: 0 | DISCHARGE
Start: 2023-09-13

## 2023-09-13 RX ORDER — POLYETHYLENE GLYCOL 3350 17 G/17G
17 POWDER, FOR SOLUTION ORAL
Qty: 0 | Refills: 0 | DISCHARGE
Start: 2023-09-13

## 2023-09-13 RX ORDER — LACTULOSE 10 G/15ML
30 SOLUTION ORAL
Qty: 0 | Refills: 0 | DISCHARGE
Start: 2023-09-13

## 2023-09-13 RX ORDER — ASCORBIC ACID 60 MG
1 TABLET,CHEWABLE ORAL
Qty: 0 | Refills: 0 | DISCHARGE
Start: 2023-09-13

## 2023-09-13 RX ORDER — FERROUS SULFATE 325(65) MG
1 TABLET ORAL
Qty: 0 | Refills: 0 | DISCHARGE
Start: 2023-09-13

## 2023-09-13 RX ORDER — CHOLECALCIFEROL (VITAMIN D3) 125 MCG
4000 CAPSULE ORAL
Qty: 0 | Refills: 0 | DISCHARGE
Start: 2023-09-13

## 2023-09-13 RX ORDER — TAMSULOSIN HYDROCHLORIDE 0.4 MG/1
1 CAPSULE ORAL
Qty: 0 | Refills: 0 | DISCHARGE
Start: 2023-09-13

## 2023-09-13 RX ORDER — MIRTAZAPINE 45 MG/1
1 TABLET, ORALLY DISINTEGRATING ORAL
Qty: 0 | Refills: 0 | DISCHARGE
Start: 2023-09-13

## 2023-09-13 RX ORDER — QUETIAPINE FUMARATE 200 MG/1
1 TABLET, FILM COATED ORAL
Qty: 0 | Refills: 0 | DISCHARGE
Start: 2023-09-13

## 2023-09-13 RX ADMIN — HEPARIN SODIUM 5000 UNIT(S): 5000 INJECTION INTRAVENOUS; SUBCUTANEOUS at 09:25

## 2023-09-13 RX ADMIN — MEROPENEM 1000 MILLIGRAM(S): 1 INJECTION INTRAVENOUS at 09:25

## 2023-09-13 NOTE — DISCHARGE NOTE NURSING/CASE MANAGEMENT/SOCIAL WORK - PATIENT PORTAL LINK FT
You can access the FollowMyHealth Patient Portal offered by [facility] by registering at the following website: [email or web address]. By joining "[employer]"’s FollowMyHealth portal, you will also be able to view your health information using other applications (apps) compatible with our system.

## 2023-09-13 NOTE — DISCHARGE NOTE NURSING/CASE MANAGEMENT/SOCIAL WORK - NSDCVIVACCINE_GEN_ALL_CORE_FT
Tdap; 08-Feb-2021 18:55; Luis Armando Michael (RN); [employer]; [id number] (Exp. Date: 21-Jul-2022); IntraMuscular; Deltoid Right.; 0.5 milliLiter(s); VIS (VIS Published: 09-May-2013, VIS Presented: 08-Feb-2021);
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
## 2023-09-18 DIAGNOSIS — S12.100K: ICD-10-CM

## 2023-09-18 DIAGNOSIS — F03.C3 UNSPECIFIED DEMENTIA, SEVERE, WITH MOOD DISTURBANCE: ICD-10-CM

## 2023-09-18 DIAGNOSIS — N18.32 CHRONIC KIDNEY DISEASE, STAGE 3B: ICD-10-CM
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
## 2023-09-18 DIAGNOSIS — D75.839 THROMBOCYTOSIS, UNSPECIFIED: ICD-10-CM

## 2023-09-18 DIAGNOSIS — Z66 DO NOT RESUSCITATE: ICD-10-CM

## 2023-09-18 DIAGNOSIS — R62.7 ADULT FAILURE TO THRIVE: ICD-10-CM

## 2023-09-18 DIAGNOSIS — E87.0 HYPEROSMOLALITY AND HYPERNATREMIA: ICD-10-CM

## 2023-09-18 DIAGNOSIS — G93.41 METABOLIC ENCEPHALOPATHY: ICD-10-CM

## 2023-09-18 DIAGNOSIS — E43 UNSPECIFIED SEVERE PROTEIN-CALORIE MALNUTRITION: ICD-10-CM

## 2023-09-18 DIAGNOSIS — N39.0 URINARY TRACT INFECTION, SITE NOT SPECIFIED: ICD-10-CM

## 2023-09-18 DIAGNOSIS — N40.0 BENIGN PROSTATIC HYPERPLASIA WITHOUT LOWER URINARY TRACT SYMPTOMS: ICD-10-CM

## 2023-09-18 DIAGNOSIS — T83.511A INFECTION AND INFLAMMATORY REACTION DUE TO INDWELLING URETHRAL CATHETER, INITIAL ENCOUNTER: ICD-10-CM

## 2023-09-18 DIAGNOSIS — I12.9 HYPERTENSIVE CHRONIC KIDNEY DISEASE WITH STAGE 1 THROUGH STAGE 4 CHRONIC KIDNEY DISEASE, OR UNSPECIFIED CHRONIC KIDNEY DISEASE: ICD-10-CM

## 2023-09-18 DIAGNOSIS — Z16.12 EXTENDED SPECTRUM BETA LACTAMASE (ESBL) RESISTANCE: ICD-10-CM
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
## 2023-09-18 DIAGNOSIS — N17.9 ACUTE KIDNEY FAILURE, UNSPECIFIED: ICD-10-CM

## 2023-09-18 DIAGNOSIS — N26.1 ATROPHY OF KIDNEY (TERMINAL): ICD-10-CM

## 2023-09-18 DIAGNOSIS — N32.0 BLADDER-NECK OBSTRUCTION: ICD-10-CM

## 2023-09-18 DIAGNOSIS — F03.C4 UNSPECIFIED DEMENTIA, SEVERE, WITH ANXIETY: ICD-10-CM

## 2023-09-18 DIAGNOSIS — D63.1 ANEMIA IN CHRONIC KIDNEY DISEASE: ICD-10-CM

## 2023-09-18 DIAGNOSIS — K59.00 CONSTIPATION, UNSPECIFIED: ICD-10-CM
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
## 2023-09-18 DIAGNOSIS — A41.51 SEPSIS DUE TO ESCHERICHIA COLI [E. COLI]: ICD-10-CM

## 2024-01-01 NOTE — PROGRESS NOTE ADULT - SUBJECTIVE AND OBJECTIVE BOX
-Moderate effusion on x-ray most likely secondary to CHF  - Treated with diuresis.     Day 1 of Anesthesia Pain Management Service    SUBJECTIVE: I'm doing ok    Pain Scale Score:	[X] Refer to charted pain scores    THERAPY:    [ ] IV PCA Morphine		[ ] 5 mg/mL	[ ] 1 mg/mL  [X] IV PCA Hydromorphone	[ ] 5 mg/mL	[X] 1 mg/mL  [ ] IV PCA Fentanyl		[ ] 50 micrograms/mL    Demand dose: 0.2 mg     Lockout: 6 minutes   Continuous Rate: 0 mg/hr  4 Hour Limit: 4 mg    MEDICATIONS  (STANDING):  enoxaparin Injectable 40 milliGRAM(s) SubCutaneous <User Schedule>  HYDROmorphone PCA (1 mG/mL) 30 milliLiter(s) PCA Continuous PCA Continuous  multivitamin 1 Tablet(s) Oral daily  mupirocin 2% Ointment 1 Application(s) Topical two times a day  senna 2 Tablet(s) Oral at bedtime  sodium chloride 0.9% with potassium chloride 20 mEq/L 1000 milliLiter(s) (75 mL/Hr) IV Continuous <Continuous>  tamsulosin 0.4 milliGRAM(s) Oral daily    MEDICATIONS  (PRN):  acetaminophen   Tablet .. 650 milliGRAM(s) Oral every 6 hours PRN Temp greater or equal to 38C (100.4F), Mild Pain (1 - 3)  diazepam    Tablet 5 milliGRAM(s) Oral every 6 hours PRN spasm  HYDROmorphone PCA (1 mG/mL) Rescue Clinician Bolus 0.5 milliGRAM(s) IV Push every 15 minutes PRN for Pain Scale GREATER THAN 6  melatonin 3 milliGRAM(s) Oral at bedtime PRN Insomnia  naloxone Injectable 0.1 milliGRAM(s) IV Push every 3 minutes PRN For ANY of the following changes in patient status:  A. RR LESS THAN 10 breaths per minute, B. Oxygen saturation LESS THAN 90%, C. Sedation score of 6  ondansetron Injectable 4 milliGRAM(s) IV Push every 6 hours PRN Nausea and/or Vomiting      OBJECTIVE:    Sedation Score:	[ X] Alert 	[ ] Drowsy 	[ ] Arousable	[ ] Asleep	[ ] Unresponsive    Side Effects:	[X ] None	[ ] Nausea	[ ] Vomiting	[ ] Pruritus  		[ ] Other:    Vital Signs Last 24 Hrs  T(C): 36.6 (16 Feb 2021 08:23), Max: 36.8 (16 Feb 2021 01:43)  T(F): 97.9 (16 Feb 2021 08:23), Max: 98.2 (16 Feb 2021 01:43)  HR: 81 (16 Feb 2021 08:23) (64 - 81)  BP: 160/84 (16 Feb 2021 08:23) (126/89 - 181/86)  BP(mean): 126 (15 Feb 2021 15:00) (104 - 126)  RR: 18 (16 Feb 2021 08:23) (15 - 18)  SpO2: 97% (16 Feb 2021 08:23) (95% - 100%)    ASSESSMENT/ PLAN    Therapy to  be:               [X] Continued   [ ] Discontinued   [ ] Changed to PRN Analgesics    Documentation and Verification of current medications:   [X] Done	[ ] Not done, not eligible    Comments: PCA use minimal. Reeducated to use. Consider transition to prn analgesics.

## 2024-01-22 NOTE — OCCUPATIONAL THERAPY INITIAL EVALUATION ADULT - LEVEL OF INDEPENDENCE, REHAB EVAL
Patient is requesting an order for  15mg meloxicam, 3 month prescription sent to     Please advise patient [phone]        [facility] [address] - [address] [facility] AT Portal to [facility] Sites [phone]   
Strong peripheral pulses
moderate assist (50% patients effort)

## 2024-03-21 NOTE — DISCHARGE NOTE PROVIDER - REASON FOR ADMISSION
Incoming refill request on patient's medication:    No protocol for requested medication.    Medication:     Disp Refills Start End     QUEtiapine (SEROquel) 50 MG tablet 60 tablet 0 2/27/2024 --    Sig - Route: Take 1 to 2 tablets by mouth daily as needed for agitation or voices.       Last office visit date: 2/27/24  Pharmacy: [id number] - [place], [place] - [id number]    Order pended, routed to clinician for review.      Prescriber's Follow Up Recommendation:  4 weeks    No Show/Cancels in Last 12 Months: none    Next Visit Date: 4/3/2024       acute infectious encephalopathy due to HCAP and complicated by sepsis

## 2024-11-05 NOTE — PHYSICAL THERAPY INITIAL EVALUATION ADULT - THERAPY FREQUENCY, PT EVAL
Immediate Brief Procedure Note    Patient: Yuko Gunter    Pre-op Diagnosis: liver mass and LAD    Post-op Diagnosis: Same    Procedure:  liver and bone marrow biopsy    Proceduralist: Willian Gomez MD    Assistants: none    Anesthesia Staff: [unfilled]    Anesthesia Type: moderate    Findings: liver mass     Estimated Blood Loss: 5cc    Complications: none    Specimens Removed: bone marrow samples and liver samples  
5-7x/week

## 2025-04-22 NOTE — ED PROVIDER NOTE - CADM POA URETHRAL CATHETER
VISIT SUMMARY:     Testing needed: NONE    Labs needed: NONE    Consults: NONE    Follow up: Follow up in 6months    If you have any questions or concerns, please call our office- [phone].     Patient Education       Having Catheter Ablation  A heart rhythm problem (arrhythmia) can make your heart beat too fast or in an irregular pattern. The problem is often caused by cells in your heart that aren’t working as they should. Or it may be due to an abnormal electric circuit. It may cause bothersome symptoms, such as an irregular heartbeat (palpitations), dizziness, shortness of breath, chest pain, or fainting. Your healthcare provider has advised catheter ablation to treat your arrhythmia. This nonsurgical procedure destroys the cells that are causing the problem.  Before the procedure  Before your catheter ablation, you'll meet with a specially trained heart doctor (cardiac electrophysiologist) who will do the procedure. They'll tell you how to get ready. You'll likely be told to stop or change your heart rhythm medicines for a period of time before the procedure. Follow your doctor’s instructions. Also:  Tell the doctor about all prescription and over-the-counter medicines you take. This includes herbs, supplements, and vitamins. It also includes daily medicines, such as insulin or blood thinners. If you're allergic to any medicines, tell the doctor.  Have any routine tests, such as blood tests, as advised.  Follow any directions you're given for not eating or drinking before your procedure..  How catheter ablation is done  Catheter ablation uses thin, flexible wires (electrode catheters) to find and destroy (ablate) problem cells. Here’s how the procedure is done:  The heart’s signals are mapped. To find the problem, an electrophysiology study (EPS) is done. During this study, the doctor tries to start (induce) your arrhythmia. This is picked up by a diagnostic (mapping) catheter . An electrical map of the  heart is then created. This shows the type of arrhythmia you have and where the problem is. Using the map as a guide, the doctor knows where to ablate.  Problem areas are destroyed using heat or cold therapy. Once the EPS shows where the problem is, the doctor threads an electrode catheter through a blood vessel to that area in the heart. Energy is sent through the catheter to destroy the problem cells.  The heart’s rhythm is tested again. After ablating the problem cells, the doctor tries to restart (reinduce) your arrhythmia. If a fast rhythm can’t be induced, the ablation is a success. But if a fast rhythm does start again, you may need more ablation.  Your experience during catheter ablation  In most cases, catheter ablation is done in an electrophysiology (EP) lab. It often takes 2 to 4 hours, and sometimes longer. You’ll receive medicine to prevent pain. Medicine will also help you relax or sleep during the procedure. If you feel uncomfortable during the procedure, tell the doctor or nurse. In some cases, you may have general anesthesia.  First, the healthcare team washes the skin on your groin (or rarely, the neck). Any hair in that area may be removed. This is where the catheters will be inserted. An IV (intravenous) line is started in your arm. Medicines and fluids are given through this IV. To help keep the insertion site germ-free (sterile), your body is draped with sheets. Only the area where the catheters will be inserted is exposed.  The healthcare provider numbs the skin where the catheters will be inserted with pain medicine (local anesthetic). Then the provider uses a small needle to make holes (punctures) in your vein or artery. They put catheters through these punctures and guide them to your heart. The provider uses X-ray monitors to help guide the catheters.  The provider then puts wires in several places in the heart to map the electrical signals. The wires also stimulate the heart.  When the  procedure is done, the provider takes the catheters out of your body. They put pressure on the puncture sites to stop any bleeding. Sometimes the provides uses a vascular access closure device to stop the bleeding. You’re then taken to a recovery room to rest. You'll need to remain lying down for 2 to 6 hours. You'll also be asked not to move the leg where the catheters were inserted for a few hours. This is to make sure the insertion sites don't bleed. For many procedures, you may be able to go home on the same day as the procedure.   Risks and possible complications  The risks of catheter ablation are fairly low compared to the benefits you receive. Discuss these risks with your doctor before the procedure. Possible risks and complications include:  Bleeding or bruising at the catheter insertion site  Blood clots  A slow heart rhythm (requiring a permanent pacemaker)  Perforation of the heart muscle, blood vessel, or lung (may require an emergency procedure)  Damage to a heart valve (rare)  Stroke or heart attack, also known as acute myocardial infarction, or AMI (rare)  Infection, which is a risk after any invasive procedure. This may be indicated by a fever of 100.4°F (38.0°C) or higher, fluid leaking, or redness and pain at the catheter insertion site.  Death (extremely rare)  [employer] last reviewed this educational content on 10/1/2021    © 5887-3966 [employer]. All rights reserved. This information is not intended as a substitute for professional medical care. Always follow your healthcare professional's instructions.              Patient Education     Understanding Supraventricular Tachycardia  Supraventricular tachycardia (SVT) is a type of abnormal heart rhythm that results in fast heartbeats. The heart normally beats 60 to 100 beats per minute while you are at rest and awake. With SVT, the heart beats more than 100 times a minute. It may even beat over 200 times a minute. This is caused by  a problem in the electrical system of the heart. It can lessen the amount of blood pumped through the heart.  How the heart beats  A heartbeat is the rhythm of the heart as it contracts to squeeze blood through the body. It’s caused by electrical signals in the heart. A beat starts when a special group of cells give off an electrical signal. These cells are in the sinoatrial (SA) node. The SA node is in the upper right chamber of your heart (atrium). The signal from the SA node travels down to the 2 lower chambers of your heart (ventricles). On the way, the signal goes through the atrioventricular (AV) node. This is a special group of cells between the atria and ventricles. From there, the signal travels to your left and right ventricles. As it travels, the signal tells nearby parts of your heart to contract. This causes your heart to pump in a coordinated way.  What is SVT?  When you have SVT, the signal to start your heartbeat doesn’t come from the SA node. Instead it comes from another part of the left or right atrium. Or it comes from the AV node. Some area outside the SA node begins to fire quickly, causing a rapid heartbeat of over 100 beats per minute or the electrical signals are caught up in an abnormal looping circuit. This shortens the time your ventricles have to fill. If your heartbeat is fast enough, your heart may be unable to pump enough blood forward to the rest of your body. The abnormal heartbeat may last for a few seconds to a few hours before your heart returns to its normal rhythm. Some SVT rhythms can last for days or weeks, or even become permanent.  Types of SVT  There are several types of SVT. They include:  Atrial fibrillation. This is most common type of SVT. The upper chambers of the heart quiver very fast instead of pumping due to disorganized electrical activity in the atria.  Atrial flutter. This type of SVT is a milder form of fibrillation. The upper chambers of the heart flutter  instead of pumping normally.  Atrioventricular anish reentrant tachycardia. It occurs when you have two channels through the AV node, instead of just one. The signal goes down one channel and up the other.  Atrioventricular reciprocating tachycardia. With this condition, there is an extra connection of muscle between the atrium and the ventricle. This is known as an accessory pathway and can conduct electricity upwards and downwards. The signal goes down the AV node and back to the atrium through the accessory pathway. It then goes down the AV node again. In rare cases, this condition leads to an abnormal heart rhythm that causes sudden death. This is a congenital defect, which means you were born with it.  Atrial tachycardia. This is another common type of SVT. A small group of cells in the atria begin to fire abnormally and trigger the fast heartbeat.  Multifocal atrial tachycardia. Multiple groups of cells in your atria fire abnormally and trigger a fast heartbeat.  What causes SVT?  Some types of SVT run in families. Some people have heart problems from birth that cause SVT. High blood pressure, heart failure, mitral valve disease, sleep apnea, thyroid problems, and heart attacks can cause SVT. Smoking, excess caffeine or alcohol, and some medicines can increase your risk of having SVT.  Symptoms of SVT  When SVT happens, you may feel no symptoms. Or you may have:  Fluttering feelings in your chest (palpitations)  A tight feeling or pain in your chest  A pulsing feeling in your neck  Dizziness  Shortness of breath  Tiredness  Fainting  Nausea  In very rare cases, SVT can cause sudden death.  Diagnosing SVT  Your primary healthcare provider may diagnose you. Or you may see a heart doctor (cardiologist). The doctor will ask about your health history. He or she will also give you a physical exam. You may also have tests. These help show what kind of SVT you have, and what may cause it. They also help check for  other problems. The tests may include:  Electrocardiogram (ECG), to analyze the abnormal rhythm  Continuous heart monitors such as a holter monitor or an event recorder to watch your heart rhythm over a longer period in an attempt to catch the SVT rhythm  Blood tests, to look for various causes such as thyroid problems or electrolyte abnormalities  Chest X-ray, to check for lung problems and look at the size of your heart  Exercise stress test, to see how well your heart works under stress  Echocardiography, to check your heart structure and function  Electrophysiologic study (EPS), an invasive procedure using wires in the heart to check the heart's electrical signals and diagnose the SVT  Date Last Reviewed: 5/1/2016  © 9274-6901 [employer]. [address] [place] [address], [place] [address]. All rights reserved. This information is not intended as a substitute for professional medical care. Always follow your healthcare professional's instructions.              No